# Patient Record
Sex: MALE | Race: WHITE | NOT HISPANIC OR LATINO | Employment: OTHER | URBAN - METROPOLITAN AREA
[De-identification: names, ages, dates, MRNs, and addresses within clinical notes are randomized per-mention and may not be internally consistent; named-entity substitution may affect disease eponyms.]

---

## 2021-12-09 ENCOUNTER — OFFICE VISIT (OUTPATIENT)
Dept: URGENT CARE | Facility: CLINIC | Age: 61
End: 2021-12-09
Payer: COMMERCIAL

## 2021-12-09 VITALS
HEART RATE: 62 BPM | RESPIRATION RATE: 19 BRPM | OXYGEN SATURATION: 97 % | DIASTOLIC BLOOD PRESSURE: 73 MMHG | SYSTOLIC BLOOD PRESSURE: 151 MMHG | TEMPERATURE: 99 F

## 2021-12-09 DIAGNOSIS — R94.31 ABNORMAL FINDING ON EKG: ICD-10-CM

## 2021-12-09 DIAGNOSIS — R07.9 CHEST PAIN, UNSPECIFIED TYPE: Primary | ICD-10-CM

## 2021-12-09 PROCEDURE — 99499 UNLISTED E&M SERVICE: CPT | Mod: S$GLB,,, | Performed by: INTERNAL MEDICINE

## 2021-12-09 PROCEDURE — 99499 NO LOS: ICD-10-PCS | Mod: S$GLB,,, | Performed by: INTERNAL MEDICINE

## 2021-12-09 RX ORDER — LISDEXAMFETAMINE DIMESYLATE 30 MG/1
30 CAPSULE ORAL
COMMUNITY
End: 2021-12-10

## 2021-12-10 ENCOUNTER — OFFICE VISIT (OUTPATIENT)
Dept: INTERNAL MEDICINE | Facility: CLINIC | Age: 61
End: 2021-12-10
Payer: COMMERCIAL

## 2021-12-10 DIAGNOSIS — R07.89 MUSCULAR CHEST PAIN: Primary | ICD-10-CM

## 2021-12-10 PROCEDURE — 99999 PR PBB SHADOW E&M-EST. PATIENT-LVL II: CPT | Mod: PBBFAC,,,

## 2021-12-10 PROCEDURE — 99213 PR OFFICE/OUTPT VISIT, EST, LEVL III, 20-29 MIN: ICD-10-PCS | Mod: GC,S$GLB,,

## 2021-12-10 PROCEDURE — 99999 PR PBB SHADOW E&M-EST. PATIENT-LVL II: ICD-10-PCS | Mod: PBBFAC,,,

## 2021-12-10 PROCEDURE — 99213 OFFICE O/P EST LOW 20 MIN: CPT | Mod: GC,S$GLB,,

## 2021-12-10 RX ORDER — MELOXICAM 15 MG/1
15 TABLET ORAL DAILY
Qty: 14 TABLET | Refills: 0 | Status: SHIPPED | OUTPATIENT
Start: 2021-12-10 | End: 2024-02-08

## 2021-12-10 RX ORDER — CYCLOBENZAPRINE HCL 5 MG
5 TABLET ORAL 3 TIMES DAILY PRN
Qty: 30 TABLET | Refills: 0 | Status: SHIPPED | OUTPATIENT
Start: 2021-12-10 | End: 2021-12-20

## 2021-12-13 ENCOUNTER — TELEPHONE (OUTPATIENT)
Dept: URGENT CARE | Facility: CLINIC | Age: 61
End: 2021-12-13
Payer: COMMERCIAL

## 2022-02-22 ENCOUNTER — TELEPHONE (OUTPATIENT)
Dept: INTERNAL MEDICINE | Facility: CLINIC | Age: 62
End: 2022-02-22
Payer: COMMERCIAL

## 2022-02-22 ENCOUNTER — OFFICE VISIT (OUTPATIENT)
Dept: URGENT CARE | Facility: CLINIC | Age: 62
End: 2022-02-22
Payer: COMMERCIAL

## 2022-02-22 VITALS
TEMPERATURE: 98 F | WEIGHT: 199 LBS | HEART RATE: 79 BPM | HEIGHT: 71 IN | DIASTOLIC BLOOD PRESSURE: 85 MMHG | SYSTOLIC BLOOD PRESSURE: 146 MMHG | OXYGEN SATURATION: 96 % | BODY MASS INDEX: 27.86 KG/M2 | RESPIRATION RATE: 17 BRPM

## 2022-02-22 DIAGNOSIS — R11.10 VOMITING, INTRACTABILITY OF VOMITING NOT SPECIFIED, PRESENCE OF NAUSEA NOT SPECIFIED, UNSPECIFIED VOMITING TYPE: Primary | ICD-10-CM

## 2022-02-22 DIAGNOSIS — R82.90 ABNORMAL URINALYSIS: ICD-10-CM

## 2022-02-22 DIAGNOSIS — R19.7 DIARRHEA, UNSPECIFIED TYPE: ICD-10-CM

## 2022-02-22 LAB
BILIRUB UR QL STRIP: POSITIVE
CTP QC/QA: YES
CTP QC/QA: YES
GLUCOSE UR QL STRIP: NEGATIVE
KETONES UR QL STRIP: POSITIVE
LEUKOCYTE ESTERASE UR QL STRIP: NEGATIVE
PH, POC UA: 6 (ref 5–8)
POC BLOOD, URINE: POSITIVE
POC MOLECULAR INFLUENZA A AGN: NEGATIVE
POC MOLECULAR INFLUENZA B AGN: NEGATIVE
POC NITRATES, URINE: NEGATIVE
PROT UR QL STRIP: POSITIVE
SARS-COV-2 RDRP RESP QL NAA+PROBE: NEGATIVE
SP GR UR STRIP: 1.02 (ref 1–1.03)
UROBILINOGEN UR STRIP-ACNC: 8 (ref 0.3–2.2)

## 2022-02-22 PROCEDURE — 81003 URINALYSIS AUTO W/O SCOPE: CPT | Mod: QW,S$GLB,, | Performed by: FAMILY MEDICINE

## 2022-02-22 PROCEDURE — 87502 POCT INFLUENZA A/B MOLECULAR: ICD-10-PCS | Mod: QW,S$GLB,, | Performed by: FAMILY MEDICINE

## 2022-02-22 PROCEDURE — 1159F MED LIST DOCD IN RCRD: CPT | Mod: CPTII,S$GLB,, | Performed by: FAMILY MEDICINE

## 2022-02-22 PROCEDURE — 3077F SYST BP >= 140 MM HG: CPT | Mod: CPTII,S$GLB,, | Performed by: FAMILY MEDICINE

## 2022-02-22 PROCEDURE — 3079F DIAST BP 80-89 MM HG: CPT | Mod: CPTII,S$GLB,, | Performed by: FAMILY MEDICINE

## 2022-02-22 PROCEDURE — U0002 COVID-19 LAB TEST NON-CDC: HCPCS | Mod: QW,S$GLB,, | Performed by: FAMILY MEDICINE

## 2022-02-22 PROCEDURE — 99214 PR OFFICE/OUTPT VISIT, EST, LEVL IV, 30-39 MIN: ICD-10-PCS | Mod: S$GLB,CS,, | Performed by: FAMILY MEDICINE

## 2022-02-22 PROCEDURE — 1160F PR REVIEW ALL MEDS BY PRESCRIBER/CLIN PHARMACIST DOCUMENTED: ICD-10-PCS | Mod: CPTII,S$GLB,, | Performed by: FAMILY MEDICINE

## 2022-02-22 PROCEDURE — 1160F RVW MEDS BY RX/DR IN RCRD: CPT | Mod: CPTII,S$GLB,, | Performed by: FAMILY MEDICINE

## 2022-02-22 PROCEDURE — 81003 POCT URINALYSIS, DIPSTICK, AUTOMATED, W/O SCOPE: ICD-10-PCS | Mod: QW,S$GLB,, | Performed by: FAMILY MEDICINE

## 2022-02-22 PROCEDURE — 87502 INFLUENZA DNA AMP PROBE: CPT | Mod: QW,S$GLB,, | Performed by: FAMILY MEDICINE

## 2022-02-22 PROCEDURE — 99214 OFFICE O/P EST MOD 30 MIN: CPT | Mod: S$GLB,CS,, | Performed by: FAMILY MEDICINE

## 2022-02-22 PROCEDURE — 3008F PR BODY MASS INDEX (BMI) DOCUMENTED: ICD-10-PCS | Mod: CPTII,S$GLB,, | Performed by: FAMILY MEDICINE

## 2022-02-22 PROCEDURE — 3008F BODY MASS INDEX DOCD: CPT | Mod: CPTII,S$GLB,, | Performed by: FAMILY MEDICINE

## 2022-02-22 PROCEDURE — 1159F PR MEDICATION LIST DOCUMENTED IN MEDICAL RECORD: ICD-10-PCS | Mod: CPTII,S$GLB,, | Performed by: FAMILY MEDICINE

## 2022-02-22 PROCEDURE — 3077F PR MOST RECENT SYSTOLIC BLOOD PRESSURE >= 140 MM HG: ICD-10-PCS | Mod: CPTII,S$GLB,, | Performed by: FAMILY MEDICINE

## 2022-02-22 PROCEDURE — 3079F PR MOST RECENT DIASTOLIC BLOOD PRESSURE 80-89 MM HG: ICD-10-PCS | Mod: CPTII,S$GLB,, | Performed by: FAMILY MEDICINE

## 2022-02-22 PROCEDURE — U0002: ICD-10-PCS | Mod: QW,S$GLB,, | Performed by: FAMILY MEDICINE

## 2022-02-22 NOTE — PROGRESS NOTES
"Subjective:       Patient ID: Santiago De Luna is a 61 y.o. male.    Vitals:  height is 5' 11" (1.803 m) and weight is 90.3 kg (199 lb). His temperature is 98.4 °F (36.9 °C). His blood pressure is 146/85 (abnormal) and his pulse is 79. His respiration is 17 and oxygen saturation is 96%.     Chief Complaint: Generalized Body Aches (Accompanied by fever and brain fog.)    Pt states yesterday started with 2 episodes of nonbloody or black vomiting and diarrhea. tmax 99.5, sore throat, congestion, body aches, back aches. No dysuria, hematuria. No cough, sob. Had some heartburn which is normal when he is going to vomit, no other cp. Sx reminded him of the flu which he gets often. Able to eat today. No covid exposure, vaccinated. Travel to [place] last 4 months ago no other recent travel, abx use, raw food consumption. No sick contacts. Pt states he meant feeling facial flushing when he described "brain fog"    URI   This is a new problem. The current episode started yesterday. There has been no fever. The fever has been present for less than 1 day. Associated symptoms include congestion, diarrhea, headaches, joint pain, nausea, a sore throat and vomiting. Pertinent negatives include no coughing. He has tried nothing for the symptoms. The treatment provided mild relief.       Constitution: Positive for fever.   HENT: Positive for congestion and sore throat.    Respiratory: Negative for cough and shortness of breath.    Gastrointestinal: Positive for nausea, vomiting and diarrhea.   Musculoskeletal: Positive for back pain and muscle ache.   Neurological: Positive for headaches.       Objective:      Physical Exam   Constitutional: He is oriented to person, place, and time. He appears well-developed. He is cooperative.  Non-toxic appearance. He does not appear ill. No distress.      Comments:Alert well appearing   HENT:   Head: Normocephalic and atraumatic.   Ears:   Right Ear: Hearing, tympanic membrane, external ear and ear " canal normal. Tympanic membrane is not erythematous. No middle ear effusion.   Left Ear: Hearing, tympanic membrane, external ear and ear canal normal. Tympanic membrane is not erythematous.  No middle ear effusion.   Nose: Nose normal. No mucosal edema, rhinorrhea or nasal deformity. No epistaxis. Right sinus exhibits no maxillary sinus tenderness and no frontal sinus tenderness. Left sinus exhibits no maxillary sinus tenderness and no frontal sinus tenderness.   Mouth/Throat: Uvula is midline, oropharynx is clear and moist and mucous membranes are normal. No trismus in the jaw. Normal dentition. No uvula swelling. No oropharyngeal exudate, posterior oropharyngeal edema, posterior oropharyngeal erythema, tonsillar abscesses or cobblestoning.   Eyes: Conjunctivae and lids are normal. No scleral icterus.   Neck: Trachea normal and phonation normal. Neck supple. No edema present. No erythema present. No neck rigidity present.   Cardiovascular: Normal rate, regular rhythm, normal heart sounds and normal pulses.   Pulmonary/Chest: Effort normal and breath sounds normal. No accessory muscle usage. No tachypnea. No respiratory distress. He has no decreased breath sounds. He has no wheezes. He has no rhonchi. He has no rales.   NAD able to speak in clear complete sentences without difficulty      Comments: NAD able to speak in clear complete sentences without difficulty      Abdominal: Normal appearance and bowel sounds are normal. Soft. There is abdominal tenderness (mild- no hx of diverticulitis) in the left upper quadrant and left lower quadrant. There is no rebound, no guarding, no tenderness at McBurney's point, negative Barfield's sign, no left CVA tenderness and no right CVA tenderness.   Musculoskeletal: Normal range of motion.         General: No deformity. Normal range of motion.   Neurological: He is alert and oriented to person, place, and time. He exhibits normal muscle tone. Coordination normal.   Skin: Skin is  warm, dry, intact, not diaphoretic and not pale.   Psychiatric: His speech is normal and behavior is normal. Judgment and thought content normal.   Nursing note and vitals reviewed.    Results for orders placed or performed in visit on 02/22/22   POCT COVID-19 Rapid Screening   Result Value Ref Range    POC Rapid COVID Negative Negative     Acceptable Yes    POCT Urinalysis, Dipstick, Automated, W/O Scope   Result Value Ref Range    POC Blood, Urine Positive (A) Negative    POC Bilirubin, Urine Positive (A) Negative    POC Urobilinogen, Urine 8.0 (A) 0.3 - 2.2    POC Ketones, Urine Positive (A) Negative    POC Protein, Urine Positive (A) Negative    POC Nitrates, Urine Negative Negative    POC Glucose, Urine Negative Negative    pH, UA 6.0 5 - 8    POC Specific Gravity, Urine 1.025 1.003 - 1.029    POC Leukocytes, Urine Negative Negative   POCT Influenza A/B Molecular   Result Value Ref Range    POC Molecular Influenza A Ag Negative Negative, Not Reported    POC Molecular Influenza B Ag Negative Negative, Not Reported     Acceptable Yes            Assessment:       1. Vomiting, intractability of vomiting not specified, presence of nausea not specified, unspecified vomiting type    2. Diarrhea, unspecified type    3. Abnormal urinalysis          Plan:         Vomiting, intractability of vomiting not specified, presence of nausea not specified, unspecified vomiting type  -     POCT COVID-19 Rapid Screening  -     POCT Urinalysis, Dipstick, Automated, W/O Scope  -     POCT Influenza A/B Molecular  -     Ambulatory referral/consult to Internal Medicine    Diarrhea, unspecified type    Abnormal urinalysis    flu and covid neg, declined zofran rx for home  Pt overall feeling better today, emesis and diarrhea has improved over course of the day. Able to eat today (smoothie, hummus and vegetables)  Discussed UA results with him, urine darker than normal (gus) but pt states hasn't drank much  "today. Has not done any very strenuous exercise or physical activity, considered rhabdo.   He denies any hx of liver/kidney dz - reviewed past labs from last few years always normal. He does drink 1-2 "pours" of whiskey nightly for past 10 years and takes mobic every few days for past 1-2 years. No excessive tylenol. No jaundice or lightening of the stool  Discussed needs labs/possible imaging to evaluate his sx further and check liver function - called [facility] service for appt tomorrow however nothing available they could see, would reach out to clinics and see if they have a walk in appt  Pt declined doing stat labs and imaging tonight and was agreeable to following up with primary care. Discussed ER precautions for the meantime   Will get avs from portal  Total time on encounter 35 min    Patient Instructions   PLEASE READ YOUR DISCHARGE INSTRUCTIONS ENTIRELY AS IT CONTAINS IMPORTANT INFORMATION.      Use gatorade/pedialyte or rehydration packets to help stay hydrated. Vitamin water and plain water do not contain rehydrating electrolytes.  Increase clear liquids (water, gatorade, pedialyte, broths, jello, etc) Hold off on solids for 12-18 hours. Then advance to BRAT diet (banana, rice, applesauce, tea, toast/crackers), then advance further as tolerated. Avoid spicy or fatty foods.   Use Peptobismol to help alleviate your diarrhea symptoms.     Avoid imodium unless you have more than 6 loose stools in 24 hours.       Wash hands frequently while sick. Avoid ibuprofen, mobic tylenol for now. Avoid alcohol      Please go to the ER if you experience worsening abdominal pain, blood in your vomit or stool, high fever, dizziness, fainting, swelling of your abdomen, inability to pass gas or stool, vomiting despite taking nausea medication, darker urine, lighter stools.     Primary care will reach out to you about an appt in the next few days      Please arrange follow up with your primary medical clinic as soon as " possible. You must understand that you've received an Urgent Care treatment only and that you may be released before all of your medical problems are known or treated. You, the patient, will arrange for follow up as instructed. If your symptoms worsen or fail to improve you should go to the Emergency Room.  WE CANNOT RULE OUT ALL POSSIBLE CAUSES OF YOUR SYMPTOMS IN THE URGENT CARE SETTING PLEASE GO TO THE ER IF YOU FEELS YOUR CONDITION IS WORSENING OR YOU WOULD LIKE EMERGENT EVALUATION.

## 2022-02-22 NOTE — PATIENT INSTRUCTIONS
PLEASE READ YOUR DISCHARGE INSTRUCTIONS ENTIRELY AS IT CONTAINS IMPORTANT INFORMATION.      Use gatorade/pedialyte or rehydration packets to help stay hydrated. Vitamin water and plain water do not contain rehydrating electrolytes.  Increase clear liquids (water, gatorade, pedialyte, broths, jello, etc) Hold off on solids for 12-18 hours. Then advance to BRAT diet (banana, rice, applesauce, tea, toast/crackers), then advance further as tolerated. Avoid spicy or fatty foods.   Use Peptobismol to help alleviate your diarrhea symptoms.     Avoid imodium unless you have more than 6 loose stools in 24 hours.       Wash hands frequently while sick. Avoid ibuprofen, mobic tylenol for now. Avoid alcohol      Please go to the ER if you experience worsening abdominal pain, blood in your vomit or stool, high fever, dizziness, fainting, swelling of your abdomen, inability to pass gas or stool, vomiting despite taking nausea medication, darker urine, lighter stools.     Primary care will reach out to you about an appt in the next few days      Please arrange follow up with your primary medical clinic as soon as possible. You must understand that you've received an Urgent Care treatment only and that you may be released before all of your medical problems are known or treated. You, the patient, will arrange for follow up as instructed. If your symptoms worsen or fail to improve you should go to the Emergency Room.  WE CANNOT RULE OUT ALL POSSIBLE CAUSES OF YOUR SYMPTOMS IN THE URGENT CARE SETTING PLEASE GO TO THE ER IF YOU FEELS YOUR CONDITION IS WORSENING OR YOU WOULD LIKE EMERGENT EVALUATION.

## 2022-02-23 NOTE — TELEPHONE ENCOUNTER
----- Message from Mary Flores sent at 2/22/2022  3:51 PM CST -----  Regarding: appt  Hi,    Received a call from [facility] requesting a Same-Day, Next Day appt for the following patient. Patient dx is Vomiting.  EP to Dr. Crawford but willing to see any available provider.    I'm unable to ophelia due to EP to Dr. Crawford, no appts populating. Can you please review and contact patient for scheduling?    Thank you,  [facility]  Taty Oneal

## 2022-02-24 ENCOUNTER — PATIENT MESSAGE (OUTPATIENT)
Dept: INTERNAL MEDICINE | Facility: CLINIC | Age: 62
End: 2022-02-24
Payer: COMMERCIAL

## 2022-03-23 ENCOUNTER — OFFICE VISIT (OUTPATIENT)
Dept: INTERNAL MEDICINE | Facility: CLINIC | Age: 62
End: 2022-03-23
Payer: COMMERCIAL

## 2022-03-23 VITALS
BODY MASS INDEX: 27.41 KG/M2 | DIASTOLIC BLOOD PRESSURE: 80 MMHG | HEIGHT: 72 IN | WEIGHT: 202.38 LBS | HEART RATE: 78 BPM | SYSTOLIC BLOOD PRESSURE: 130 MMHG | OXYGEN SATURATION: 95 % | TEMPERATURE: 98 F

## 2022-03-23 DIAGNOSIS — F41.1 GAD (GENERALIZED ANXIETY DISORDER): ICD-10-CM

## 2022-03-23 DIAGNOSIS — Z01.89 ENCOUNTER FOR ROUTINE LABORATORY TESTING: ICD-10-CM

## 2022-03-23 DIAGNOSIS — R42 VERTIGO: Primary | ICD-10-CM

## 2022-03-23 PROCEDURE — 3079F PR MOST RECENT DIASTOLIC BLOOD PRESSURE 80-89 MM HG: ICD-10-PCS | Mod: CPTII,S$GLB,, | Performed by: NURSE PRACTITIONER

## 2022-03-23 PROCEDURE — 3008F PR BODY MASS INDEX (BMI) DOCUMENTED: ICD-10-PCS | Mod: CPTII,S$GLB,, | Performed by: NURSE PRACTITIONER

## 2022-03-23 PROCEDURE — 99213 OFFICE O/P EST LOW 20 MIN: CPT | Mod: S$GLB,,, | Performed by: NURSE PRACTITIONER

## 2022-03-23 PROCEDURE — 99999 PR PBB SHADOW E&M-EST. PATIENT-LVL III: ICD-10-PCS | Mod: PBBFAC,,, | Performed by: NURSE PRACTITIONER

## 2022-03-23 PROCEDURE — 1159F PR MEDICATION LIST DOCUMENTED IN MEDICAL RECORD: ICD-10-PCS | Mod: CPTII,S$GLB,, | Performed by: NURSE PRACTITIONER

## 2022-03-23 PROCEDURE — 1159F MED LIST DOCD IN RCRD: CPT | Mod: CPTII,S$GLB,, | Performed by: NURSE PRACTITIONER

## 2022-03-23 PROCEDURE — 3075F SYST BP GE 130 - 139MM HG: CPT | Mod: CPTII,S$GLB,, | Performed by: NURSE PRACTITIONER

## 2022-03-23 PROCEDURE — 3079F DIAST BP 80-89 MM HG: CPT | Mod: CPTII,S$GLB,, | Performed by: NURSE PRACTITIONER

## 2022-03-23 PROCEDURE — 99213 PR OFFICE/OUTPT VISIT, EST, LEVL III, 20-29 MIN: ICD-10-PCS | Mod: S$GLB,,, | Performed by: NURSE PRACTITIONER

## 2022-03-23 PROCEDURE — 3008F BODY MASS INDEX DOCD: CPT | Mod: CPTII,S$GLB,, | Performed by: NURSE PRACTITIONER

## 2022-03-23 PROCEDURE — 99999 PR PBB SHADOW E&M-EST. PATIENT-LVL III: CPT | Mod: PBBFAC,,, | Performed by: NURSE PRACTITIONER

## 2022-03-23 PROCEDURE — 3075F PR MOST RECENT SYSTOLIC BLOOD PRESS GE 130-139MM HG: ICD-10-PCS | Mod: CPTII,S$GLB,, | Performed by: NURSE PRACTITIONER

## 2022-03-23 RX ORDER — TADALAFIL 5 MG/1
TABLET ORAL
COMMUNITY
Start: 2020-01-01 | End: 2023-04-11 | Stop reason: SDUPTHER

## 2022-03-23 RX ORDER — MECLIZINE HYDROCHLORIDE 25 MG/1
25 TABLET ORAL 3 TIMES DAILY PRN
Qty: 30 TABLET | Refills: 0 | Status: SHIPPED | OUTPATIENT
Start: 2022-03-23 | End: 2022-04-06 | Stop reason: SDUPTHER

## 2022-03-23 RX ORDER — BUSPIRONE HYDROCHLORIDE 5 MG/1
5 TABLET ORAL 3 TIMES DAILY PRN
Qty: 30 TABLET | Refills: 0 | Status: SHIPPED | OUTPATIENT
Start: 2022-03-23 | End: 2022-07-21

## 2022-03-23 NOTE — PROGRESS NOTES
"Subjective:       Patient ID: Santiago De Luna is a 61 y.o. male.    Chief Complaint: Anxiety      Patient is a 61 y.o. male who traditionally follows with Francine Crawford MD presenting today for:    Anxiety and Visual Vertigo  States for his whole life he has experienced visual "defects". States he could make lights move in a 3D fashion if he stared at them as a kid.  Finds now that he cannot look through fences/mesh ect cause the inability to focus either far nor close makes him dizzy.   Additionally he cannot drive over bridges or drive fast such as [place] the [place] because it causes this vertigo. States driving over bridges (even just talking about it) makes his palms sweaty and incites panic. The dizziness only lasts as long as the exposure.      Review of patient's allergies indicates:   Allergen Reactions    Indomethacin      Diarrhea and gas    Nsaids (non-steroidal anti-inflammatory drug) Itching, Rash and Swelling     Swelling of face    Shellfish derived Itching, Rash and Swelling     Mussels,clams,shrimps- rash  Swelling of face      Hydrocodone Hives, Itching and Rash    Hydrocodone-acetaminophen Itching and Rash       Medication List with Changes/Refills   New Medications    BUSPIRONE (BUSPAR) 5 MG TAB    Take 1 tablet (5 mg total) by mouth 3 (three) times daily as needed (anxiety).    MECLIZINE (ANTIVERT) 25 MG TABLET    Take 1 tablet (25 mg total) by mouth 3 (three) times daily as needed for Dizziness.   Current Medications    MELOXICAM (MOBIC) 15 MG TABLET    Take 1 tablet (15 mg total) by mouth once daily.    TADALAFIL (CIALIS) 5 MG TABLET           Health Maintenance    Flu Vaccine: 09/24/2021  Tetanus/Tdap: 12/29/2010  Zoster Vaccine: 11/30/2021, 09/28/2021    He has never smoked.    Medical, social and surgical history has been reviewed with the patient.      Review of Systems   HENT: Negative for tinnitus.    Eyes: Negative for blurred vision, double vision and photophobia.   Neurological: " "Positive for dizziness. Negative for tremors, loss of consciousness, weakness and headaches.   Psychiatric/Behavioral: The patient is nervous/anxious.        Objective:   /80 (BP Location: Right arm, Patient Position: Sitting, BP Method: Medium (Manual))   Pulse 78   Temp 97.9 °F (36.6 °C) (Temporal)   Ht 5' 11.5" (1.816 m)   Wt 91.8 kg (202 lb 6.1 oz)   SpO2 95%   BMI 27.83 kg/m²       Physical Exam  Vitals reviewed.   Constitutional:       Appearance: Normal appearance.   HENT:      Head: Normocephalic and atraumatic.      Right Ear: A middle ear effusion is present. Tympanic membrane is bulging.      Left Ear: A middle ear effusion is present. Tympanic membrane is bulging.   Eyes:      Extraocular Movements: Extraocular movements intact.      Conjunctiva/sclera: Conjunctivae normal.      Pupils: Pupils are equal, round, and reactive to light.   Cardiovascular:      Rate and Rhythm: Normal rate and regular rhythm.      Heart sounds: Normal heart sounds. No murmur heard.  Pulmonary:      Effort: Pulmonary effort is normal.      Breath sounds: Normal breath sounds. No wheezing.   Skin:     General: Skin is warm and dry.   Neurological:      Mental Status: He is alert and oriented to person, place, and time.      Cranial Nerves: No cranial nerve deficit (CN III - XII grossly intact).      Motor: No weakness.       Assessment and Plan:     1. Vertigo  2. JOSE GUADALUPE (generalized anxiety disorder)    Chronic, recurrent, medication trial. Referral if no s/s improvement.     - meclizine (ANTIVERT) 25 mg tablet; Take 1 tablet (25 mg total) by mouth 3 (three) times daily as needed for Dizziness.  Dispense: 30 tablet; Refill: 0  - busPIRone (BUSPAR) 5 MG Tab; Take 1 tablet (5 mg total) by mouth 3 (three) times daily as needed (anxiety).  Dispense: 30 tablet; Refill: 0    3. Encounter for routine laboratory testing    - CBC Auto Differential; Future  - Comprehensive Metabolic Panel; Future  - Lipid Panel; Future  - TSH; " Future  - PSA, Screening; Future        Non-smoker

## 2022-03-31 ENCOUNTER — LAB VISIT (OUTPATIENT)
Dept: LAB | Facility: HOSPITAL | Age: 62
End: 2022-03-31
Attending: NURSE PRACTITIONER
Payer: COMMERCIAL

## 2022-03-31 DIAGNOSIS — Z01.89 ENCOUNTER FOR ROUTINE LABORATORY TESTING: ICD-10-CM

## 2022-03-31 LAB
ALBUMIN SERPL BCP-MCNC: 4.3 G/DL (ref 3.5–5.2)
ALP SERPL-CCNC: 45 U/L (ref 55–135)
ALT SERPL W/O P-5'-P-CCNC: 40 U/L (ref 10–44)
ANION GAP SERPL CALC-SCNC: 8 MMOL/L (ref 8–16)
AST SERPL-CCNC: 32 U/L (ref 10–40)
BASOPHILS # BLD AUTO: 0.05 K/UL (ref 0–0.2)
BASOPHILS NFR BLD: 0.9 % (ref 0–1.9)
BILIRUB SERPL-MCNC: 1.3 MG/DL (ref 0.1–1)
BUN SERPL-MCNC: 8 MG/DL (ref 8–23)
CALCIUM SERPL-MCNC: 10.5 MG/DL (ref 8.7–10.5)
CHLORIDE SERPL-SCNC: 104 MMOL/L (ref 95–110)
CHOLEST SERPL-MCNC: 209 MG/DL (ref 120–199)
CHOLEST/HDLC SERPL: 3.4 {RATIO} (ref 2–5)
CO2 SERPL-SCNC: 28 MMOL/L (ref 23–29)
COMPLEXED PSA SERPL-MCNC: 1.9 NG/ML (ref 0–4)
CREAT SERPL-MCNC: 0.7 MG/DL (ref 0.5–1.4)
DIFFERENTIAL METHOD: ABNORMAL
EOSINOPHIL # BLD AUTO: 0.2 K/UL (ref 0–0.5)
EOSINOPHIL NFR BLD: 2.6 % (ref 0–8)
ERYTHROCYTE [DISTWIDTH] IN BLOOD BY AUTOMATED COUNT: 11.8 % (ref 11.5–14.5)
EST. GFR  (AFRICAN AMERICAN): >60 ML/MIN/1.73 M^2
EST. GFR  (NON AFRICAN AMERICAN): >60 ML/MIN/1.73 M^2
GLUCOSE SERPL-MCNC: 89 MG/DL (ref 70–110)
HCT VFR BLD AUTO: 44.7 % (ref 40–54)
HDLC SERPL-MCNC: 62 MG/DL (ref 40–75)
HDLC SERPL: 29.7 % (ref 20–50)
HGB BLD-MCNC: 14.9 G/DL (ref 14–18)
IMM GRANULOCYTES # BLD AUTO: 0.01 K/UL (ref 0–0.04)
IMM GRANULOCYTES NFR BLD AUTO: 0.2 % (ref 0–0.5)
LDLC SERPL CALC-MCNC: 124.4 MG/DL (ref 63–159)
LYMPHOCYTES # BLD AUTO: 2.1 K/UL (ref 1–4.8)
LYMPHOCYTES NFR BLD: 35.7 % (ref 18–48)
MCH RBC QN AUTO: 33.9 PG (ref 27–31)
MCHC RBC AUTO-ENTMCNC: 33.3 G/DL (ref 32–36)
MCV RBC AUTO: 102 FL (ref 82–98)
MONOCYTES # BLD AUTO: 0.7 K/UL (ref 0.3–1)
MONOCYTES NFR BLD: 11.2 % (ref 4–15)
NEUTROPHILS # BLD AUTO: 2.9 K/UL (ref 1.8–7.7)
NEUTROPHILS NFR BLD: 49.4 % (ref 38–73)
NONHDLC SERPL-MCNC: 147 MG/DL
NRBC BLD-RTO: 0 /100 WBC
PLATELET # BLD AUTO: 254 K/UL (ref 150–450)
PMV BLD AUTO: 9.9 FL (ref 9.2–12.9)
POTASSIUM SERPL-SCNC: 4.8 MMOL/L (ref 3.5–5.1)
PROT SERPL-MCNC: 6.4 G/DL (ref 6–8.4)
RBC # BLD AUTO: 4.39 M/UL (ref 4.6–6.2)
SODIUM SERPL-SCNC: 140 MMOL/L (ref 136–145)
TRIGL SERPL-MCNC: 113 MG/DL (ref 30–150)
TSH SERPL DL<=0.005 MIU/L-ACNC: 1.58 UIU/ML (ref 0.4–4)
WBC # BLD AUTO: 5.82 K/UL (ref 3.9–12.7)

## 2022-03-31 PROCEDURE — 85025 COMPLETE CBC W/AUTO DIFF WBC: CPT | Performed by: NURSE PRACTITIONER

## 2022-03-31 PROCEDURE — 36415 COLL VENOUS BLD VENIPUNCTURE: CPT | Mod: PO | Performed by: NURSE PRACTITIONER

## 2022-03-31 PROCEDURE — 84443 ASSAY THYROID STIM HORMONE: CPT | Performed by: NURSE PRACTITIONER

## 2022-03-31 PROCEDURE — 80053 COMPREHEN METABOLIC PANEL: CPT | Performed by: NURSE PRACTITIONER

## 2022-03-31 PROCEDURE — 80061 LIPID PANEL: CPT | Performed by: NURSE PRACTITIONER

## 2022-03-31 PROCEDURE — 84153 ASSAY OF PSA TOTAL: CPT | Performed by: NURSE PRACTITIONER

## 2022-04-05 NOTE — PROGRESS NOTES
Subjective:       Patient ID: Santiago De Luna is a 61 y.o. male.    Chief Complaint: Annual Wellness Visit      Santiago De Luna is a 61 y.o. male who presents today for an annual wellness visit.     Since our last visit patient has been taking Antivert nightly and notes that he has some relief in his vertigo symptoms.     Review of patient's allergies indicates:   Allergen Reactions    Indomethacin      Diarrhea and gas    Shellfish derived Itching, Rash and Swelling     Mussels,clams,shrimps- rash  Swelling of face      Hydrocodone Hives, Itching and Rash    Hydrocodone-acetaminophen Itching and Rash      Medication List with Changes/Refills   Current Medications    BUSPIRONE (BUSPAR) 5 MG TAB    Take 1 tablet (5 mg total) by mouth 3 (three) times daily as needed (anxiety).    MELOXICAM (MOBIC) 15 MG TABLET    Take 1 tablet (15 mg total) by mouth once daily.    TADALAFIL (CIALIS) 5 MG TABLET       Changed and/or Refilled Medications    Modified Medication Previous Medication    MECLIZINE (ANTIVERT) 25 MG TABLET meclizine (ANTIVERT) 25 mg tablet       Take 1 tablet (25 mg total) by mouth 3 (three) times daily as needed for Dizziness.    Take 1 tablet (25 mg total) by mouth 3 (three) times daily as needed for Dizziness.       Health Maintenance     Flu Vaccine: 09/24/2021  Tetanus/Tdap: 04/06/2022   Zoster Vaccine: 11/30/2021, 09/28/2021  HIV/Hepatitis C Screen: Declined    PSA: 3/31/2022    How would you described your general health: Good    Patient ambulates on his own without an assistive device.     On average, how many days per week do you do moderate to strenuous exercise:  (like a brisk walk or jog; this does not include your job/work)  3-5     On average, how many minutes do you exercise at this level each day? 45    Do you eat fruits and vegetable every day?  Yes    Do you have any questions or concerns about your eating habits?  No    Do you have any concerns in regards to access to food? No    Do you always  "wear your seat belt in the car?  Yes    Do you Text while driving? No    Have you ever used tobacco products? No    Have you ever been screened for HIV? Yes  Would you like to be screened for HIV? No    Do you go to the dentist regularly? Yes  When was you last exam:     Do you go to the eye doctor regularly? Yes  When was your last exam:    Do you wear contacts, glasses, reading glasses? Yes    Do you have any personal or family history of breast/ovarian/colon cancer?  No       If yes, who?    Do you have any personal or family history of heart disease? Yes       If yes, who?    Have you often been bothered by feeling down, depressed, or hopeless?  Not at all    Have you often been bothered by having little interest or pleasure in doing things?  Not at all    Review of Systems   Constitutional: Negative for fever.   HENT: Negative for congestion, sore throat and tinnitus.    Eyes: Negative for blurred vision, double vision and photophobia.   Respiratory: Negative for cough and shortness of breath.    Cardiovascular: Negative for chest pain.   Gastrointestinal: Negative for abdominal pain, nausea and vomiting.   Musculoskeletal: Positive for joint pain. Negative for back pain and falls.   Neurological: Positive for dizziness (improving). Negative for tremors, loss of consciousness, weakness and headaches.   Psychiatric/Behavioral: Negative for memory loss.       Objective:     /80 (BP Location: Right arm, Patient Position: Sitting, BP Method: Medium (Manual))   Pulse 64   Temp 97.3 °F (36.3 °C) (Temporal)   Ht 5' 11.5" (1.816 m)   Wt 90.8 kg (200 lb 2.8 oz)   SpO2 97%   BMI 27.53 kg/m²     Physical Exam  Vitals reviewed.   Constitutional:       Appearance: Normal appearance.   HENT:      Head: Normocephalic and atraumatic.   Cardiovascular:      Rate and Rhythm: Normal rate and regular rhythm.      Heart sounds: Normal heart sounds.   Pulmonary:      Effort: Pulmonary effort is normal.      Breath " sounds: Normal breath sounds. No wheezing.   Musculoskeletal:      Right shoulder: Normal range of motion (pain with flexion against resistance). Decreased strength (very minimally noted).   Neurological:      Mental Status: He is alert and oriented to person, place, and time.       BP Readings from Last 3 Encounters:   04/06/22 120/80   03/23/22 130/80   02/22/22 (!) 146/85     Wt Readings from Last 3 Encounters:   04/06/22 90.8 kg (200 lb 2.8 oz)   03/23/22 91.8 kg (202 lb 6.1 oz)   02/22/22 90.3 kg (199 lb)       Last Labs:  Glucose   Date Value Ref Range Status   03/31/2022 89 70 - 110 mg/dL Final     BUN   Date Value Ref Range Status   03/31/2022 8 8 - 23 mg/dL Final     Creatinine   Date Value Ref Range Status   03/31/2022 0.7 0.5 - 1.4 mg/dL Final     Cholesterol   Date Value Ref Range Status   03/31/2022 209 (H) 120 - 199 mg/dL Final     Comment:     The National Cholesterol Education Program (NCEP) has set the  following guidelines (reference ranges) for Cholesterol:  Optimal.....................<200 mg/dL  Borderline High.............200-239 mg/dL  High........................> or = 240 mg/dL       No results found for: HGBA1C  Hemoglobin   Date Value Ref Range Status   03/31/2022 14.9 14.0 - 18.0 g/dL Final     Hematocrit   Date Value Ref Range Status   03/31/2022 44.7 40.0 - 54.0 % Final     I have reviewed the following:     Details / Date    [x]   Labs     []   Micro     []   Pathology     []   Imaging     []   Cardiology Procedures     [x]   Other  Patient's Medical and Surgical History        Assessment and Plan:     1. Encounter for annual health examination    --Immunizations reviewed.  --Discussed benefits of maintaining up to date health    Maintenance.    2. Vertigo    - meclizine (ANTIVERT) 25 mg tablet; Take 1 tablet (25 mg total) by mouth 3 (three) times daily as needed for Dizziness.  Dispense: 90 tablet; Refill: 1    3. Acute pain of right shoulder  4. History of rotator cuff surgery    -  Ambulatory referral/consult to Orthopedics; Future    5. Need for vaccination    - Td Vaccine (Adult) - Preservative Free

## 2022-04-06 ENCOUNTER — OFFICE VISIT (OUTPATIENT)
Dept: INTERNAL MEDICINE | Facility: CLINIC | Age: 62
End: 2022-04-06
Payer: COMMERCIAL

## 2022-04-06 VITALS
HEIGHT: 72 IN | OXYGEN SATURATION: 97 % | SYSTOLIC BLOOD PRESSURE: 120 MMHG | DIASTOLIC BLOOD PRESSURE: 80 MMHG | TEMPERATURE: 97 F | BODY MASS INDEX: 27.11 KG/M2 | HEART RATE: 64 BPM | WEIGHT: 200.19 LBS

## 2022-04-06 DIAGNOSIS — Z00.00 ENCOUNTER FOR ANNUAL HEALTH EXAMINATION: Primary | ICD-10-CM

## 2022-04-06 DIAGNOSIS — R42 VERTIGO: ICD-10-CM

## 2022-04-06 DIAGNOSIS — Z98.890 HISTORY OF ROTATOR CUFF SURGERY: ICD-10-CM

## 2022-04-06 DIAGNOSIS — Z23 NEED FOR VACCINATION: ICD-10-CM

## 2022-04-06 DIAGNOSIS — M25.511 ACUTE PAIN OF RIGHT SHOULDER: ICD-10-CM

## 2022-04-06 PROCEDURE — 90714 TD VACC NO PRESV 7 YRS+ IM: CPT | Mod: S$GLB,,, | Performed by: NURSE PRACTITIONER

## 2022-04-06 PROCEDURE — 3008F PR BODY MASS INDEX (BMI) DOCUMENTED: ICD-10-PCS | Mod: CPTII,S$GLB,, | Performed by: NURSE PRACTITIONER

## 2022-04-06 PROCEDURE — 1160F RVW MEDS BY RX/DR IN RCRD: CPT | Mod: CPTII,S$GLB,, | Performed by: NURSE PRACTITIONER

## 2022-04-06 PROCEDURE — 3079F PR MOST RECENT DIASTOLIC BLOOD PRESSURE 80-89 MM HG: ICD-10-PCS | Mod: CPTII,S$GLB,, | Performed by: NURSE PRACTITIONER

## 2022-04-06 PROCEDURE — 90471 TD VACCINE GREATER THAN OR EQUAL TO 7YO PRESERVATIVE FREE IM: ICD-10-PCS | Mod: S$GLB,,, | Performed by: NURSE PRACTITIONER

## 2022-04-06 PROCEDURE — 1159F MED LIST DOCD IN RCRD: CPT | Mod: CPTII,S$GLB,, | Performed by: NURSE PRACTITIONER

## 2022-04-06 PROCEDURE — 90471 IMMUNIZATION ADMIN: CPT | Mod: S$GLB,,, | Performed by: NURSE PRACTITIONER

## 2022-04-06 PROCEDURE — 99396 PREV VISIT EST AGE 40-64: CPT | Mod: 25,S$GLB,, | Performed by: NURSE PRACTITIONER

## 2022-04-06 PROCEDURE — 1159F PR MEDICATION LIST DOCUMENTED IN MEDICAL RECORD: ICD-10-PCS | Mod: CPTII,S$GLB,, | Performed by: NURSE PRACTITIONER

## 2022-04-06 PROCEDURE — 90714 TD VACCINE GREATER THAN OR EQUAL TO 7YO PRESERVATIVE FREE IM: ICD-10-PCS | Mod: S$GLB,,, | Performed by: NURSE PRACTITIONER

## 2022-04-06 PROCEDURE — 3079F DIAST BP 80-89 MM HG: CPT | Mod: CPTII,S$GLB,, | Performed by: NURSE PRACTITIONER

## 2022-04-06 PROCEDURE — 99396 PR PREVENTIVE VISIT,EST,40-64: ICD-10-PCS | Mod: 25,S$GLB,, | Performed by: NURSE PRACTITIONER

## 2022-04-06 PROCEDURE — 3074F SYST BP LT 130 MM HG: CPT | Mod: CPTII,S$GLB,, | Performed by: NURSE PRACTITIONER

## 2022-04-06 PROCEDURE — 1160F PR REVIEW ALL MEDS BY PRESCRIBER/CLIN PHARMACIST DOCUMENTED: ICD-10-PCS | Mod: CPTII,S$GLB,, | Performed by: NURSE PRACTITIONER

## 2022-04-06 PROCEDURE — 3008F BODY MASS INDEX DOCD: CPT | Mod: CPTII,S$GLB,, | Performed by: NURSE PRACTITIONER

## 2022-04-06 PROCEDURE — 99999 PR PBB SHADOW E&M-EST. PATIENT-LVL IV: ICD-10-PCS | Mod: PBBFAC,,, | Performed by: NURSE PRACTITIONER

## 2022-04-06 PROCEDURE — 3074F PR MOST RECENT SYSTOLIC BLOOD PRESSURE < 130 MM HG: ICD-10-PCS | Mod: CPTII,S$GLB,, | Performed by: NURSE PRACTITIONER

## 2022-04-06 PROCEDURE — 99999 PR PBB SHADOW E&M-EST. PATIENT-LVL IV: CPT | Mod: PBBFAC,,, | Performed by: NURSE PRACTITIONER

## 2022-04-06 RX ORDER — MECLIZINE HYDROCHLORIDE 25 MG/1
25 TABLET ORAL 3 TIMES DAILY PRN
Qty: 90 TABLET | Refills: 1 | Status: SHIPPED | OUTPATIENT
Start: 2022-04-06

## 2022-04-20 DIAGNOSIS — M25.511 RIGHT SHOULDER PAIN, UNSPECIFIED CHRONICITY: Primary | ICD-10-CM

## 2022-04-21 ENCOUNTER — HOSPITAL ENCOUNTER (OUTPATIENT)
Dept: RADIOLOGY | Facility: HOSPITAL | Age: 62
Discharge: HOME | End: 2022-04-21
Attending: PHYSICIAN ASSISTANT
Payer: COMMERCIAL

## 2022-04-21 ENCOUNTER — OFFICE VISIT (OUTPATIENT)
Dept: SPORTS MEDICINE | Facility: CLINIC | Age: 62
End: 2022-04-21
Payer: COMMERCIAL

## 2022-04-21 VITALS
BODY MASS INDEX: 28.09 KG/M2 | SYSTOLIC BLOOD PRESSURE: 138 MMHG | HEART RATE: 64 BPM | WEIGHT: 200.63 LBS | DIASTOLIC BLOOD PRESSURE: 83 MMHG | HEIGHT: 71 IN

## 2022-04-21 DIAGNOSIS — M25.511 RIGHT SHOULDER PAIN, UNSPECIFIED CHRONICITY: ICD-10-CM

## 2022-04-21 DIAGNOSIS — M25.511 ACUTE PAIN OF RIGHT SHOULDER: ICD-10-CM

## 2022-04-21 DIAGNOSIS — M75.101 ROTATOR CUFF SYNDROME, RIGHT: Primary | ICD-10-CM

## 2022-04-21 DIAGNOSIS — Z98.890 HISTORY OF ROTATOR CUFF SURGERY: ICD-10-CM

## 2022-04-21 PROCEDURE — 99204 OFFICE O/P NEW MOD 45 MIN: CPT | Mod: 25,S$GLB,, | Performed by: PHYSICIAN ASSISTANT

## 2022-04-21 PROCEDURE — 3008F PR BODY MASS INDEX (BMI) DOCUMENTED: ICD-10-PCS | Mod: CPTII,S$GLB,, | Performed by: PHYSICIAN ASSISTANT

## 2022-04-21 PROCEDURE — 73030 XR SHOULDER COMPLETE 2 OR MORE VIEWS RIGHT: ICD-10-PCS | Mod: 26,RT,, | Performed by: RADIOLOGY

## 2022-04-21 PROCEDURE — 1159F PR MEDICATION LIST DOCUMENTED IN MEDICAL RECORD: ICD-10-PCS | Mod: CPTII,S$GLB,, | Performed by: PHYSICIAN ASSISTANT

## 2022-04-21 PROCEDURE — 99999 PR PBB SHADOW E&M-EST. PATIENT-LVL IV: CPT | Mod: PBBFAC,,, | Performed by: PHYSICIAN ASSISTANT

## 2022-04-21 PROCEDURE — 20610 PR DRAIN/INJECT LARGE JOINT/BURSA: ICD-10-PCS | Mod: RT,S$GLB,, | Performed by: PHYSICIAN ASSISTANT

## 2022-04-21 PROCEDURE — 20610 DRAIN/INJ JOINT/BURSA W/O US: CPT | Mod: RT,S$GLB,, | Performed by: PHYSICIAN ASSISTANT

## 2022-04-21 PROCEDURE — 3079F DIAST BP 80-89 MM HG: CPT | Mod: CPTII,S$GLB,, | Performed by: PHYSICIAN ASSISTANT

## 2022-04-21 PROCEDURE — 1160F PR REVIEW ALL MEDS BY PRESCRIBER/CLIN PHARMACIST DOCUMENTED: ICD-10-PCS | Mod: CPTII,S$GLB,, | Performed by: PHYSICIAN ASSISTANT

## 2022-04-21 PROCEDURE — 1160F RVW MEDS BY RX/DR IN RCRD: CPT | Mod: CPTII,S$GLB,, | Performed by: PHYSICIAN ASSISTANT

## 2022-04-21 PROCEDURE — 73030 X-RAY EXAM OF SHOULDER: CPT | Mod: 26,RT,, | Performed by: RADIOLOGY

## 2022-04-21 PROCEDURE — 3075F SYST BP GE 130 - 139MM HG: CPT | Mod: CPTII,S$GLB,, | Performed by: PHYSICIAN ASSISTANT

## 2022-04-21 PROCEDURE — 1159F MED LIST DOCD IN RCRD: CPT | Mod: CPTII,S$GLB,, | Performed by: PHYSICIAN ASSISTANT

## 2022-04-21 PROCEDURE — 73030 X-RAY EXAM OF SHOULDER: CPT | Mod: TC,PN,RT

## 2022-04-21 PROCEDURE — 3075F PR MOST RECENT SYSTOLIC BLOOD PRESS GE 130-139MM HG: ICD-10-PCS | Mod: CPTII,S$GLB,, | Performed by: PHYSICIAN ASSISTANT

## 2022-04-21 PROCEDURE — 3008F BODY MASS INDEX DOCD: CPT | Mod: CPTII,S$GLB,, | Performed by: PHYSICIAN ASSISTANT

## 2022-04-21 PROCEDURE — 99204 PR OFFICE/OUTPT VISIT, NEW, LEVL IV, 45-59 MIN: ICD-10-PCS | Mod: 25,S$GLB,, | Performed by: PHYSICIAN ASSISTANT

## 2022-04-21 PROCEDURE — 3079F PR MOST RECENT DIASTOLIC BLOOD PRESSURE 80-89 MM HG: ICD-10-PCS | Mod: CPTII,S$GLB,, | Performed by: PHYSICIAN ASSISTANT

## 2022-04-21 PROCEDURE — 99999 PR PBB SHADOW E&M-EST. PATIENT-LVL IV: ICD-10-PCS | Mod: PBBFAC,,, | Performed by: PHYSICIAN ASSISTANT

## 2022-04-21 RX ORDER — TRIAMCINOLONE ACETONIDE 40 MG/ML
40 INJECTION, SUSPENSION INTRA-ARTICULAR; INTRAMUSCULAR
Status: COMPLETED | OUTPATIENT
Start: 2022-04-21 | End: 2022-04-21

## 2022-04-21 RX ORDER — BUPIVACAINE HYDROCHLORIDE 2.5 MG/ML
3 INJECTION, SOLUTION INFILTRATION; PERINEURAL
Status: COMPLETED | OUTPATIENT
Start: 2022-04-21 | End: 2022-04-21

## 2022-04-21 RX ADMIN — TRIAMCINOLONE ACETONIDE 40 MG: 40 INJECTION, SUSPENSION INTRA-ARTICULAR; INTRAMUSCULAR at 01:04

## 2022-04-21 RX ADMIN — BUPIVACAINE HYDROCHLORIDE 7.5 MG: 2.5 INJECTION, SOLUTION INFILTRATION; PERINEURAL at 01:04

## 2022-04-21 NOTE — PROGRESS NOTES
Subjective:          Chief Complaint: Santiago De Luna is a 61 y.o. male who had no chief complaint listed for this encounter.    HPI   Patient who is RHD and a previous competitive swimmer presents to clinic with right shoulder pain x 6 months. Initially, he injured his shoulder lifting luggage overhead during a flight in October 2021. He then injured it again approximately 2 months ago when lifting a bike. Pain increases with overhead and cross body movements. He has noticed a decrease in his strength and range of motion and an increase in his pain. He has been taking [id number] every other day prn pain for his knee and foot pain. Pain at rest is 0/10. Pain at its worst is 4/10. He is still completing an [facility] home exercise program. He has been unable to swim in the past 6 months but is still biking frequently.     Previous hx of Right RCR in 11/2011 and Left RCR in 8/2016    Review of Systems   Constitutional: Negative. Negative for chills, fever, weight gain and weight loss.   HENT: Negative for congestion and sore throat.    Eyes: Negative for blurred vision and double vision.   Cardiovascular: Negative for chest pain, leg swelling and palpitations.   Respiratory: Negative for cough and shortness of breath.    Hematologic/Lymphatic: Does not bruise/bleed easily.   Skin: Negative for itching, poor wound healing and rash.   Musculoskeletal: Positive for joint pain. Negative for back pain, joint swelling, muscle weakness, myalgias and stiffness.   Gastrointestinal: Negative for abdominal pain, constipation, diarrhea, nausea and vomiting.   Genitourinary: Negative.  Negative for frequency and hematuria.   Neurological: Negative for dizziness, headaches, numbness, paresthesias and sensory change.   Psychiatric/Behavioral: Negative for altered mental status and depression. The patient is not nervous/anxious.    Allergic/Immunologic: Negative for hives.                   Objective:        General: Santiago is well-developed,  well-nourished, appears stated age, in no acute distress, alert and oriented to time, place and person.     General    Vitals reviewed.  Constitutional: He is oriented to person, place, and time. He appears well-developed and well-nourished. No distress.   HENT:   Head: Normocephalic.   Eyes: EOM are normal.   Cardiovascular: Normal rate and regular rhythm.    Pulmonary/Chest: Effort normal. No respiratory distress.   Neurological: He is alert and oriented to person, place, and time. He has normal reflexes. No cranial nerve deficit. Coordination normal.   Psychiatric: He has a normal mood and affect. His behavior is normal. Judgment and thought content normal.         Right Shoulder Exam     Inspection/Observation   Swelling: absent  Bruising: absent  Scars: present  Deformity: absent  Scapular Winging: absent  Scapular Dyskinesia: negative  Atrophy: absent    Tenderness   The patient is tender to palpation of the acromioclavicular joint.    Range of Motion   Active abduction:  160 normal   Passive abduction:  160 normal   Extension:  50 normal   Forward Flexion:  150 (pain) abnormal   Adduction: 80 normal  External Rotation 0 degrees:  60 (pain) normal   External Rotation 90 degrees: 90 normal  Internal rotation 0 degrees:  T12 (pain) abnormal   Internal rotation 90 degrees:  80 normal     Tests & Signs   Apprehension: negative  Cross arm: positive  Drop arm: negative  Servin test: positive  Impingement: negative  Sulcus: absent  Rotator Cuff Painful Arc/Range: mild  Lift Off Sign: negative  Active Compression Test ([place]'s Sign): negative  Yergason's Test: negative  Speed's Test: negative  Anterior Drawer Test: 1+   Posterior Drawer Test: 1+    Other   Sensation: normal    Left Shoulder Exam     Inspection/Observation   Swelling: absent  Bruising: absent  Scars: present  Deformity: absent  Scapular Winging: absent  Scapular Dyskinesia: negative  Atrophy: absent    Tenderness   The patient is experiencing no  tenderness.     Range of Motion   Active abduction:  160 normal   Passive abduction:  160 normal   Extension:  50 normal   Forward Flexion:  180 normal   Adduction: 80 normal  External Rotation 0 degrees:  60 normal   External Rotation 90 degrees: 100 normal  Internal rotation 0 degrees:  T10 normal   Internal rotation 90 degrees:  80 normal     Tests & Signs   Apprehension: negative  Cross arm: negative  Drop arm: negative  Servin test: negative  Impingement: negative  Sulcus: absent  Lift Off Sign: negative  Active Compression test ([place]'s Sign): negative  Yergasons's Test: negative  Speed's Test: negative  Anterior Drawer Test: 1+  Posterior Drawer Test: 1+    Other   Sensation: normal       Muscle Strength   Right Upper Extremity   Shoulder Abduction: 4/5 (pain 4+)   Shoulder Internal Rotation: 5/5   Shoulder External Rotation: 5/5   Supraspinatus: 4/5 (pain 4+)   Subscapularis: 5/5   Biceps: 5/5 (pain)   Left Upper Extremity  Shoulder Abduction: 5/5   Shoulder Internal Rotation: 5/5   Shoulder External Rotation: 5/5   Supraspinatus: 5/5   Subscapularis: 5/5   Biceps: 5/5     Reflexes     Left Side  Biceps:  2+  Triceps:  2+  Brachioradialis:  2+    Right Side   Biceps:  2+  Triceps:  2+  Brachioradialis:  2+    RADIOGRAPHS: 4/21/22  Right shoulder:  FINDINGS:  Bones: Osseous structures demonstrate normal mineralization.  No evidence for fracture or osseous destructive lesion.  There is postoperative change at the level of the humeral head.  Small ossific fragment along the greater tuberosity, which may represent sequela of remote trauma versus focus of calcific tendinitis.     Joints: No evidence for glenohumeral dislocation.  Degenerative change involving the glenohumeral and acromioclavicular joints, noting spurring along the inferior glenoid.     Soft tissues: Unremarkable.        Assessment:       Encounter Diagnoses   Name Primary?    Acute pain of right shoulder     History of rotator cuff surgery      Rotator cuff syndrome, right Yes          Plan:       1. I made the decision to obtain old records of the patient including previous notes and imaging. New imaging was ordered today of the extremity or extremities evaluated. I independently reviewed and interpreted the radiographs and/or MRIs today as well as prior imaging. Reviewed radiographs with patient in detail.    2. Continue Mobic 15mg    3. Injection Procedure right shoulder  A time out was performed, including verification of patient ID, procedure, site and side, availability of information and equipment, review of safety issues, and agreement with consent, the procedure site was marked.    After time out was performed, the patient was prepped aseptically with povidone-iodine swabsticks. A diagnostic and therapeutic injection of 1:3cc Kenalog/Marcaine was given under sterile technique using a 22g x 1.5 needle from the Posterior  aspect of the right Subacromial in the sitting position.      Santiago De Luna had no adverse reactions to the medication. Pain decreased. He was instructed to apply ice to the joint for 20 minutes and avoid strenuous activities for 24-36 hours following the injection. He was warned of possible blood sugar and/or blood pressure changes during that time. Following that time, he can resume regular activities.    He was reminded to call the clinic immediately for any adverse side effects as explained in clinic today.    4. We discussed at length different treatment options including conservative vs surgical management. These include anti-inflammatories, acetaminophen, rest, ice, heat, formal physical therapy including strengthening and stretching exercises, home exercise programs, dry needling, CSI, MRI, and finally surgical intervention. Patient decided to move forward with formal PT and CSI today. Will hold off on MRI at this time.     5. Referral to formal PT for RC and periscapular strengthening program at [facility]    6. RTC  in 8 weeks with Zeynep Gottlieb PA-C for follow up right shoulder if continued pain. Possible MRI if failure of CSI and formal PT.                      Patient questionnaires may have been collected.

## 2022-05-30 ENCOUNTER — PATIENT MESSAGE (OUTPATIENT)
Dept: ADMINISTRATIVE | Facility: OTHER | Age: 62
End: 2022-05-30
Payer: COMMERCIAL

## 2023-03-28 ENCOUNTER — OFFICE VISIT (OUTPATIENT)
Dept: INTERNAL MEDICINE | Facility: CLINIC | Age: 63
End: 2023-03-28
Payer: COMMERCIAL

## 2023-03-28 VITALS
OXYGEN SATURATION: 97 % | BODY MASS INDEX: 27.56 KG/M2 | HEART RATE: 64 BPM | RESPIRATION RATE: 16 BRPM | SYSTOLIC BLOOD PRESSURE: 124 MMHG | WEIGHT: 203.5 LBS | TEMPERATURE: 97 F | DIASTOLIC BLOOD PRESSURE: 68 MMHG | HEIGHT: 72 IN

## 2023-03-28 DIAGNOSIS — R35.1 NOCTURIA: ICD-10-CM

## 2023-03-28 DIAGNOSIS — Z00.00 ANNUAL PHYSICAL EXAM: Primary | ICD-10-CM

## 2023-03-28 DIAGNOSIS — R39.15 URGENCY OF URINATION: ICD-10-CM

## 2023-03-28 PROCEDURE — 1160F PR REVIEW ALL MEDS BY PRESCRIBER/CLIN PHARMACIST DOCUMENTED: ICD-10-PCS | Mod: CPTII,S$GLB,,

## 2023-03-28 PROCEDURE — 99396 PREV VISIT EST AGE 40-64: CPT | Mod: S$GLB,,,

## 2023-03-28 PROCEDURE — 99396 PR PREVENTIVE VISIT,EST,40-64: ICD-10-PCS | Mod: S$GLB,,,

## 2023-03-28 PROCEDURE — 3078F DIAST BP <80 MM HG: CPT | Mod: CPTII,S$GLB,,

## 2023-03-28 PROCEDURE — 3074F SYST BP LT 130 MM HG: CPT | Mod: CPTII,S$GLB,,

## 2023-03-28 PROCEDURE — 1159F MED LIST DOCD IN RCRD: CPT | Mod: CPTII,S$GLB,,

## 2023-03-28 PROCEDURE — 99999 PR PBB SHADOW E&M-EST. PATIENT-LVL V: ICD-10-PCS | Mod: PBBFAC,,,

## 2023-03-28 PROCEDURE — 99999 PR PBB SHADOW E&M-EST. PATIENT-LVL V: CPT | Mod: PBBFAC,,,

## 2023-03-28 PROCEDURE — 3078F PR MOST RECENT DIASTOLIC BLOOD PRESSURE < 80 MM HG: ICD-10-PCS | Mod: CPTII,S$GLB,,

## 2023-03-28 PROCEDURE — 1160F RVW MEDS BY RX/DR IN RCRD: CPT | Mod: CPTII,S$GLB,,

## 2023-03-28 PROCEDURE — 3008F PR BODY MASS INDEX (BMI) DOCUMENTED: ICD-10-PCS | Mod: CPTII,S$GLB,,

## 2023-03-28 PROCEDURE — 3074F PR MOST RECENT SYSTOLIC BLOOD PRESSURE < 130 MM HG: ICD-10-PCS | Mod: CPTII,S$GLB,,

## 2023-03-28 PROCEDURE — 1159F PR MEDICATION LIST DOCUMENTED IN MEDICAL RECORD: ICD-10-PCS | Mod: CPTII,S$GLB,,

## 2023-03-28 PROCEDURE — 3008F BODY MASS INDEX DOCD: CPT | Mod: CPTII,S$GLB,,

## 2023-03-28 NOTE — PROGRESS NOTES
Subjective     Patient ID: Santiago De Luna is a 62 y.o. male.    Chief Complaint: Establish Care and Annual Exam    62 y.o M with anxiety, prior rotator cuff tear of bilateral shoulders, ACL repair of bilateral knees presenting today for annual physical exam.     Patient reports overall feeling well, but does have a few concerns. Reports that recently has been feeling very sensitive to cold. Has been needing to wear sweaters and using additional blankets while sleeping. Has been having some increased hair loss. He denies constipation, fatigue, weight gain.     Also complains of urgency, frequency and nocturia. Has been having 3-4x episodes of nocturia. Does endorse drinking large amounts of water prior to bed. Denies dysuria, fevers, chills. No malodor or change in color of the urine. No weak stream or hesitancy. PSA one year ago WNL. He does have family hx of prostate cancer in his dad who was diagnosed at 40 y.o.    Health Maintenance:  Hep C screening: Due  HIV screening: Due  Colonoscopy: Completed 2017, next 2027  Covid: Vaccinated and Boosted x2  TDaP: Completed 2022, next in 2023  Shingles: Completed  Yearly influenza: Completed.     Social History:  Semi-retired, works as   Lives with wife  Drinks 2 beers and 2 shots of whiskey per night  Smokes marijuana occasionally   No tobacco/vaping. Never user.   Eats healthy. No fast food. Cooks 70% of meals. Minimizes red meat consumption. Mostly eats fish and veggies.   Exercise: Walks 3 miles a day for 5x a week. Lifts weight 3x a week.     Review of Systems   Constitutional:  Negative for activity change, chills, fatigue, fever and unexpected weight change.   HENT:  Negative for hearing loss, rhinorrhea and trouble swallowing.    Eyes:  Negative for discharge and visual disturbance.   Respiratory:  Negative for chest tightness, shortness of breath and wheezing.    Cardiovascular:  Negative for chest pain and palpitations.   Gastrointestinal:  Negative  for abdominal pain, blood in stool, constipation, diarrhea, nausea and vomiting.   Endocrine: Positive for cold intolerance. Negative for polydipsia and polyuria.   Genitourinary:  Positive for frequency and urgency. Negative for difficulty urinating and hematuria.   Musculoskeletal:  Positive for arthralgias (R knee, chronic pain). Negative for joint swelling and neck pain.   Integumentary:  Negative for rash and wound.   Neurological:  Negative for weakness and headaches.   Psychiatric/Behavioral:  Negative for confusion and dysphoric mood.       Objective     Physical Exam  Vitals reviewed.   Constitutional:       Appearance: Normal appearance. He is not ill-appearing.   HENT:      Head: Normocephalic and atraumatic.      Nose: Nose normal.      Mouth/Throat:      Mouth: Mucous membranes are moist.      Pharynx: Oropharynx is clear.   Eyes:      Extraocular Movements: Extraocular movements intact.      Conjunctiva/sclera: Conjunctivae normal.   Cardiovascular:      Rate and Rhythm: Normal rate and regular rhythm.      Pulses: Normal pulses.      Heart sounds: Normal heart sounds.   Pulmonary:      Effort: Pulmonary effort is normal. No respiratory distress.      Breath sounds: Normal breath sounds. No wheezing or rales.   Abdominal:      General: Bowel sounds are normal.      Palpations: Abdomen is soft.      Tenderness: There is no abdominal tenderness.   Musculoskeletal:         General: No swelling or tenderness. Normal range of motion.      Cervical back: Normal range of motion and neck supple.      Right lower leg: No edema.      Left lower leg: No edema.   Lymphadenopathy:      Cervical: No cervical adenopathy.   Skin:     General: Skin is warm and dry.      Capillary Refill: Capillary refill takes less than 2 seconds.   Neurological:      General: No focal deficit present.      Mental Status: He is alert and oriented to person, place, and time.      Sensory: No sensory deficit.      Motor: No weakness.       Gait: Gait normal.   Psychiatric:         Mood and Affect: Mood normal.         Behavior: Behavior normal.         Thought Content: Thought content normal.         Judgment: Judgment normal.        Assessment and Plan     Problem List Items Addressed This Visit    None  Visit Diagnoses       Annual physical exam    -  Primary    Relevant Orders    PSA, SCREENING    CBC W/ AUTO DIFFERENTIAL    TSH    Lipid Panel    COMPREHENSIVE METABOLIC PANEL    Hemoglobin A1C    HEPATITIS C ANTIBODY    HIV 1/2 Ag/Ab (4th Gen)    Nocturia        Relevant Orders    Hemoglobin A1C    Urinalysis    Urgency of urination        Relevant Orders    Urinalysis    Ambulatory referral/consult to Urology          Annual physical exam  - Medical Hx, Social Hx, Family Hx, Allergies reviewed  - Medications reviewed  - Previous labs reviewed  - Age appropriate counseling given  - Extensive counseling given regarding healthy diet, including limiting saturated fats and avoiding high cholesterol foods (red meats, crustaceans, etc). Also counseled on importance of dedicated exercise regimen, ideally 30 minutes for 5 days a week or total of 150 minutes per week.   - Health Maintenance reviewed, care gaps closed  - Orders placed:    -     PSA, SCREENING; Future; Expected date: 03/28/2023  -     CBC W/ AUTO DIFFERENTIAL; Future; Expected date: 03/28/2023  -     TSH; Future; Expected date: 03/28/2023  -     Lipid Panel; Future; Expected date: 03/28/2023  -     COMPREHENSIVE METABOLIC PANEL; Future; Expected date: 03/28/2023  -     Hemoglobin A1C; Future; Expected date: 03/28/2023  -     HEPATITIS C ANTIBODY; Future; Expected date: 03/28/2023  -     HIV 1/2 Ag/Ab (4th Gen); Future; Expected date: 03/28/2023    Nocturia  Urgency of urination  Patient reports 2 year history of urgency and frequency that have progressively worsened  Denies fevers, chills, dysuria, flank pain  Suspect most likely secondary to BPH with LUTS  Orders placed:     -      Hemoglobin A1C; Future; Expected date: 03/28/2023  -     Urinalysis; Future; Expected date: 03/28/2023  -     Ambulatory referral/consult to Urology; Future; Expected date: 04/04/2023      Greater than 40 minutes was spent today in review of patient's medical records, history/physical, counseling and MDM  Patient to RTC in 6 months for follow-up or earlier if needed  Case discussed with Dr. Simone Crawford MD  Department of Hospital Medicine   [facility]

## 2023-03-29 ENCOUNTER — LAB VISIT (OUTPATIENT)
Dept: LAB | Facility: HOSPITAL | Age: 63
End: 2023-03-29
Payer: COMMERCIAL

## 2023-03-29 DIAGNOSIS — R35.1 NOCTURIA: ICD-10-CM

## 2023-03-29 DIAGNOSIS — Z00.00 ANNUAL PHYSICAL EXAM: ICD-10-CM

## 2023-03-29 LAB
ALBUMIN SERPL BCP-MCNC: 4.4 G/DL (ref 3.5–5.2)
ALP SERPL-CCNC: 46 U/L (ref 55–135)
ALT SERPL W/O P-5'-P-CCNC: 31 U/L (ref 10–44)
ANION GAP SERPL CALC-SCNC: 9 MMOL/L (ref 8–16)
AST SERPL-CCNC: 24 U/L (ref 10–40)
BASOPHILS # BLD AUTO: 0.05 K/UL (ref 0–0.2)
BASOPHILS NFR BLD: 0.8 % (ref 0–1.9)
BILIRUB SERPL-MCNC: 1.1 MG/DL (ref 0.1–1)
BUN SERPL-MCNC: 8 MG/DL (ref 8–23)
CALCIUM SERPL-MCNC: 10.5 MG/DL (ref 8.7–10.5)
CHLORIDE SERPL-SCNC: 106 MMOL/L (ref 95–110)
CHOLEST SERPL-MCNC: 233 MG/DL (ref 120–199)
CHOLEST/HDLC SERPL: 3.3 {RATIO} (ref 2–5)
CO2 SERPL-SCNC: 24 MMOL/L (ref 23–29)
COMPLEXED PSA SERPL-MCNC: 2 NG/ML (ref 0–4)
CREAT SERPL-MCNC: 0.8 MG/DL (ref 0.5–1.4)
DIFFERENTIAL METHOD: ABNORMAL
EOSINOPHIL # BLD AUTO: 0.1 K/UL (ref 0–0.5)
EOSINOPHIL NFR BLD: 2.3 % (ref 0–8)
ERYTHROCYTE [DISTWIDTH] IN BLOOD BY AUTOMATED COUNT: 12.3 % (ref 11.5–14.5)
EST. GFR  (NO RACE VARIABLE): >60 ML/MIN/1.73 M^2
ESTIMATED AVG GLUCOSE: 100 MG/DL (ref 68–131)
GLUCOSE SERPL-MCNC: 92 MG/DL (ref 70–110)
HBA1C MFR BLD: 5.1 % (ref 4–5.6)
HCT VFR BLD AUTO: 46.2 % (ref 40–54)
HCV AB SERPL QL IA: NORMAL
HDLC SERPL-MCNC: 70 MG/DL (ref 40–75)
HDLC SERPL: 30 % (ref 20–50)
HGB BLD-MCNC: 15.5 G/DL (ref 14–18)
HIV 1+2 AB+HIV1 P24 AG SERPL QL IA: NORMAL
IMM GRANULOCYTES # BLD AUTO: 0.05 K/UL (ref 0–0.04)
IMM GRANULOCYTES NFR BLD AUTO: 0.8 % (ref 0–0.5)
LDLC SERPL CALC-MCNC: 141 MG/DL (ref 63–159)
LYMPHOCYTES # BLD AUTO: 1.9 K/UL (ref 1–4.8)
LYMPHOCYTES NFR BLD: 31.1 % (ref 18–48)
MCH RBC QN AUTO: 34 PG (ref 27–31)
MCHC RBC AUTO-ENTMCNC: 33.5 G/DL (ref 32–36)
MCV RBC AUTO: 101 FL (ref 82–98)
MONOCYTES # BLD AUTO: 0.6 K/UL (ref 0.3–1)
MONOCYTES NFR BLD: 9.9 % (ref 4–15)
NEUTROPHILS # BLD AUTO: 3.3 K/UL (ref 1.8–7.7)
NEUTROPHILS NFR BLD: 55.1 % (ref 38–73)
NONHDLC SERPL-MCNC: 163 MG/DL
NRBC BLD-RTO: 0 /100 WBC
PLATELET # BLD AUTO: 249 K/UL (ref 150–450)
PMV BLD AUTO: 9.9 FL (ref 9.2–12.9)
POTASSIUM SERPL-SCNC: 5 MMOL/L (ref 3.5–5.1)
PROT SERPL-MCNC: 7 G/DL (ref 6–8.4)
RBC # BLD AUTO: 4.56 M/UL (ref 4.6–6.2)
SODIUM SERPL-SCNC: 139 MMOL/L (ref 136–145)
TRIGL SERPL-MCNC: 110 MG/DL (ref 30–150)
TSH SERPL DL<=0.005 MIU/L-ACNC: 1.58 UIU/ML (ref 0.4–4)
WBC # BLD AUTO: 6.04 K/UL (ref 3.9–12.7)

## 2023-03-29 PROCEDURE — 83036 HEMOGLOBIN GLYCOSYLATED A1C: CPT

## 2023-03-29 PROCEDURE — 80053 COMPREHEN METABOLIC PANEL: CPT

## 2023-03-29 PROCEDURE — 84443 ASSAY THYROID STIM HORMONE: CPT

## 2023-03-29 PROCEDURE — 86803 HEPATITIS C AB TEST: CPT

## 2023-03-29 PROCEDURE — 87389 HIV-1 AG W/HIV-1&-2 AB AG IA: CPT

## 2023-03-29 PROCEDURE — 85025 COMPLETE CBC W/AUTO DIFF WBC: CPT

## 2023-03-29 PROCEDURE — 36415 COLL VENOUS BLD VENIPUNCTURE: CPT | Mod: PO

## 2023-03-29 PROCEDURE — 84153 ASSAY OF PSA TOTAL: CPT

## 2023-03-29 PROCEDURE — 80061 LIPID PANEL: CPT

## 2023-04-11 ENCOUNTER — OFFICE VISIT (OUTPATIENT)
Dept: UROLOGY | Facility: CLINIC | Age: 63
End: 2023-04-11
Payer: COMMERCIAL

## 2023-04-11 VITALS
BODY MASS INDEX: 27.09 KG/M2 | WEIGHT: 200 LBS | HEIGHT: 72 IN | SYSTOLIC BLOOD PRESSURE: 125 MMHG | HEART RATE: 64 BPM | DIASTOLIC BLOOD PRESSURE: 79 MMHG

## 2023-04-11 DIAGNOSIS — Z80.42 FAMILY HISTORY OF PROSTATE CANCER: ICD-10-CM

## 2023-04-11 DIAGNOSIS — N52.9 ERECTILE DYSFUNCTION, UNSPECIFIED ERECTILE DYSFUNCTION TYPE: ICD-10-CM

## 2023-04-11 DIAGNOSIS — N40.1 BPH WITH OBSTRUCTION/LOWER URINARY TRACT SYMPTOMS: Primary | ICD-10-CM

## 2023-04-11 DIAGNOSIS — R39.15 URGENCY OF URINATION: ICD-10-CM

## 2023-04-11 DIAGNOSIS — N13.8 BPH WITH OBSTRUCTION/LOWER URINARY TRACT SYMPTOMS: Primary | ICD-10-CM

## 2023-04-11 LAB
BILIRUB SERPL-MCNC: NEGATIVE MG/DL
BLOOD URINE, POC: NEGATIVE
CLARITY, POC UA: CLEAR
COLOR, POC UA: YELLOW
GLUCOSE UR QL STRIP: NEGATIVE
KETONES UR QL STRIP: NEGATIVE
LEUKOCYTE ESTERASE URINE, POC: NEGATIVE
NITRITE, POC UA: NEGATIVE
PH, POC UA: 5
POC RESIDUAL URINE VOLUME: 49 ML (ref 0–100)
PROTEIN, POC: NEGATIVE
SPECIFIC GRAVITY, POC UA: 1.01
UROBILINOGEN, POC UA: NEGATIVE

## 2023-04-11 PROCEDURE — 1159F MED LIST DOCD IN RCRD: CPT | Mod: CPTII,S$GLB,, | Performed by: NURSE PRACTITIONER

## 2023-04-11 PROCEDURE — 81002 POCT URINE DIPSTICK WITHOUT MICROSCOPE: ICD-10-PCS | Mod: S$GLB,,, | Performed by: NURSE PRACTITIONER

## 2023-04-11 PROCEDURE — 3008F BODY MASS INDEX DOCD: CPT | Mod: CPTII,S$GLB,, | Performed by: NURSE PRACTITIONER

## 2023-04-11 PROCEDURE — 81002 URINALYSIS NONAUTO W/O SCOPE: CPT | Mod: S$GLB,,, | Performed by: NURSE PRACTITIONER

## 2023-04-11 PROCEDURE — 1159F PR MEDICATION LIST DOCUMENTED IN MEDICAL RECORD: ICD-10-PCS | Mod: CPTII,S$GLB,, | Performed by: NURSE PRACTITIONER

## 2023-04-11 PROCEDURE — 3044F HG A1C LEVEL LT 7.0%: CPT | Mod: CPTII,S$GLB,, | Performed by: NURSE PRACTITIONER

## 2023-04-11 PROCEDURE — 3078F DIAST BP <80 MM HG: CPT | Mod: CPTII,S$GLB,, | Performed by: NURSE PRACTITIONER

## 2023-04-11 PROCEDURE — 51798 POCT BLADDER SCAN: ICD-10-PCS | Mod: S$GLB,,, | Performed by: NURSE PRACTITIONER

## 2023-04-11 PROCEDURE — 3074F PR MOST RECENT SYSTOLIC BLOOD PRESSURE < 130 MM HG: ICD-10-PCS | Mod: CPTII,S$GLB,, | Performed by: NURSE PRACTITIONER

## 2023-04-11 PROCEDURE — 99999 PR PBB SHADOW E&M-EST. PATIENT-LVL III: ICD-10-PCS | Mod: PBBFAC,,, | Performed by: NURSE PRACTITIONER

## 2023-04-11 PROCEDURE — 1160F PR REVIEW ALL MEDS BY PRESCRIBER/CLIN PHARMACIST DOCUMENTED: ICD-10-PCS | Mod: CPTII,S$GLB,, | Performed by: NURSE PRACTITIONER

## 2023-04-11 PROCEDURE — 3074F SYST BP LT 130 MM HG: CPT | Mod: CPTII,S$GLB,, | Performed by: NURSE PRACTITIONER

## 2023-04-11 PROCEDURE — 99204 PR OFFICE/OUTPT VISIT, NEW, LEVL IV, 45-59 MIN: ICD-10-PCS | Mod: S$GLB,,, | Performed by: NURSE PRACTITIONER

## 2023-04-11 PROCEDURE — 99204 OFFICE O/P NEW MOD 45 MIN: CPT | Mod: S$GLB,,, | Performed by: NURSE PRACTITIONER

## 2023-04-11 PROCEDURE — 3044F PR MOST RECENT HEMOGLOBIN A1C LEVEL <7.0%: ICD-10-PCS | Mod: CPTII,S$GLB,, | Performed by: NURSE PRACTITIONER

## 2023-04-11 PROCEDURE — 1160F RVW MEDS BY RX/DR IN RCRD: CPT | Mod: CPTII,S$GLB,, | Performed by: NURSE PRACTITIONER

## 2023-04-11 PROCEDURE — 99999 PR PBB SHADOW E&M-EST. PATIENT-LVL III: CPT | Mod: PBBFAC,,, | Performed by: NURSE PRACTITIONER

## 2023-04-11 PROCEDURE — 3008F PR BODY MASS INDEX (BMI) DOCUMENTED: ICD-10-PCS | Mod: CPTII,S$GLB,, | Performed by: NURSE PRACTITIONER

## 2023-04-11 PROCEDURE — 51798 US URINE CAPACITY MEASURE: CPT | Mod: S$GLB,,, | Performed by: NURSE PRACTITIONER

## 2023-04-11 PROCEDURE — 3078F PR MOST RECENT DIASTOLIC BLOOD PRESSURE < 80 MM HG: ICD-10-PCS | Mod: CPTII,S$GLB,, | Performed by: NURSE PRACTITIONER

## 2023-04-11 RX ORDER — TADALAFIL 5 MG/1
5 TABLET ORAL DAILY PRN
Qty: 20 TABLET | Refills: 11 | Status: SHIPPED | OUTPATIENT
Start: 2023-04-11

## 2023-04-11 RX ORDER — TAMSULOSIN HYDROCHLORIDE 0.4 MG/1
0.4 CAPSULE ORAL DAILY
Qty: 30 CAPSULE | Refills: 11 | Status: SHIPPED | OUTPATIENT
Start: 2023-04-11 | End: 2024-04-10

## 2023-04-11 NOTE — PROGRESS NOTES
CHIEF COMPLAINT:    Mr. De Luna is a 62 y.o. male presenting for   Chief Complaint   Patient presents with    Urinary Frequency     And Urgency        PRESENTING ILLNESS:    Santiago De Luna is a 62 y.o. male with a PMH of rhinitis who presents for urinary frequency and urgency.    New patient to urology department. Presents today for urinary frequency and urgency.  Also reports having nocturia 3-4 times nightly.  He reports a good urinary stream and complete bladder emptying.  These symptoms are chronic in nature.    Father had been diagnosed with slow growing prostate cancer in his 40s.  Reviewed last PSA, which is wnl.  PHAN today.    History of erectile dysfunction.  Prescribed cialis 5 mg prn, which work well for him.  Will send prescription to [facility].     REVIEW OF SYSTEMS:    Review of Systems   Constitutional:  Negative for chills and fever.   Respiratory:  Negative for shortness of breath.    Cardiovascular:  Negative for chest pain.   Gastrointestinal:  Negative for constipation and diarrhea.   Genitourinary:  Positive for frequency and urgency. Negative for dysuria, flank pain and hematuria.        Nocturia   Neurological:  Negative for dizziness and weakness.     PATIENT HISTORY:    Past Medical History:   Diagnosis Date    Anxiety disorder, unspecified     Male erectile dysfunction, unspecified        Family History   Problem Relation Age of Onset    Alcohol abuse Mother     Early death Mother     Aortic aneurysm Mother     Early death Father     Hearing loss Father     Heart disease Father     Prostate cancer Father        Allergies:  Indomethacin, Shellfish derived, Hydrocodone, and Hydrocodone-acetaminophen    Medications:    Current Outpatient Medications:     busPIRone (BUSPAR) 5 MG Tab, TAKE 1 TABLET(5 MG) BY MOUTH THREE TIMES DAILY AS NEEDED FOR ANXIETY, Disp: 90 tablet, Rfl: 3    tadalafiL (CIALIS) 5 MG tablet, Take 1 tablet (5 mg total) by mouth daily as needed for Erectile Dysfunction.,  Disp: 20 tablet, Rfl: 11    meclizine (ANTIVERT) 25 mg tablet, Take 1 tablet (25 mg total) by mouth 3 (three) times daily as needed for Dizziness. (Patient not taking: Reported on 3/28/2023), Disp: 90 tablet, Rfl: 1    meloxicam (MOBIC) 15 MG tablet, Take 1 tablet (15 mg total) by mouth once daily. (Patient not taking: Reported on 3/28/2023), Disp: 14 tablet, Rfl: 0    tamsulosin (FLOMAX) 0.4 mg Cap, Take 1 capsule (0.4 mg total) by mouth once daily., Disp: 30 capsule, Rfl: 11    PHYSICAL EXAMINATION:    Physical Exam  Vitals and nursing note reviewed.   Constitutional:       Appearance: Normal appearance. He is well-developed.   HENT:      Head: Normocephalic and atraumatic.   Eyes:      Pupils: Pupils are equal, round, and reactive to light.   Pulmonary:      Effort: Pulmonary effort is normal.   Genitourinary:     Prostate: Enlarged. Not tender.      Rectum: Normal.      Comments: No nodules, smooth prostate  Musculoskeletal:         General: Normal range of motion.      Cervical back: Normal range of motion.   Skin:     General: Skin is warm and dry.   Neurological:      Mental Status: He is alert and oriented to person, place, and time.   Psychiatric:         Behavior: Behavior normal.         LABS:    U/a performed in office today: yellow, ph 5, 1.015, otherwise unremarkable  Bladder scan performed by Nurse Le.  PVR 49 ml    Lab Results   Component Value Date    PSA 2.0 03/29/2023    PSA 1.9 03/31/2022       IMPRESSION:  Encounter Diagnoses   Name Primary?    BPH with obstruction/lower urinary tract symptoms Yes    Urgency of urination     Erectile dysfunction, unspecified erectile dysfunction type     Family history of prostate cancer          PLAN:  Problem List Items Addressed This Visit    None  Visit Diagnoses       BPH with obstruction/lower urinary tract symptoms    -  Primary    Relevant Medications    tamsulosin (FLOMAX) 0.4 mg Cap    Urgency of urination        Relevant Orders    POCT URINE  DIPSTICK WITHOUT MICROSCOPE (Completed)    POCT Bladder Scan (Completed)    Erectile dysfunction, unspecified erectile dysfunction type        Relevant Medications    tadalafiL (CIALIS) 5 MG tablet    Family history of prostate cancer                1. Bph with LUTs   Trial of flomax.  Instructed patient to take 1st dose at night when in bed due to potential 1st dose syncope episode.  Patient was also informed and educated on side effects including retrograde ejaculation.   Reviewed PSA   PHAN wnl  2. Erectile dysfunction   - continue cialis as needed, new script sent to [facility]  3. Rtc in 6 weeks    Maribel Thomas NP    I spent 30 minutes with the patient. Over 50% of the visit was spent in counseling.

## 2023-05-01 ENCOUNTER — OFFICE VISIT (OUTPATIENT)
Dept: PODIATRY | Facility: CLINIC | Age: 63
End: 2023-05-01
Payer: COMMERCIAL

## 2023-05-01 ENCOUNTER — APPOINTMENT (OUTPATIENT)
Dept: RADIOLOGY | Facility: OTHER | Age: 63
End: 2023-05-01
Attending: PODIATRIST
Payer: COMMERCIAL

## 2023-05-01 VITALS
HEIGHT: 72 IN | SYSTOLIC BLOOD PRESSURE: 150 MMHG | HEART RATE: 69 BPM | BODY MASS INDEX: 27.09 KG/M2 | DIASTOLIC BLOOD PRESSURE: 73 MMHG | WEIGHT: 200 LBS

## 2023-05-01 DIAGNOSIS — M79.675 GREAT TOE PAIN, LEFT: ICD-10-CM

## 2023-05-01 DIAGNOSIS — M20.5X2 HALLUX LIMITUS, ACQUIRED, LEFT: ICD-10-CM

## 2023-05-01 DIAGNOSIS — M79.675 GREAT TOE PAIN, LEFT: Primary | ICD-10-CM

## 2023-05-01 PROCEDURE — 3044F PR MOST RECENT HEMOGLOBIN A1C LEVEL <7.0%: ICD-10-PCS | Mod: CPTII,S$GLB,, | Performed by: PODIATRIST

## 2023-05-01 PROCEDURE — 3008F BODY MASS INDEX DOCD: CPT | Mod: CPTII,S$GLB,, | Performed by: PODIATRIST

## 2023-05-01 PROCEDURE — 3077F PR MOST RECENT SYSTOLIC BLOOD PRESSURE >= 140 MM HG: ICD-10-PCS | Mod: CPTII,S$GLB,, | Performed by: PODIATRIST

## 2023-05-01 PROCEDURE — 99999 PR PBB SHADOW E&M-EST. PATIENT-LVL III: ICD-10-PCS | Mod: PBBFAC,,, | Performed by: PODIATRIST

## 2023-05-01 PROCEDURE — 3078F DIAST BP <80 MM HG: CPT | Mod: CPTII,S$GLB,, | Performed by: PODIATRIST

## 2023-05-01 PROCEDURE — 3077F SYST BP >= 140 MM HG: CPT | Mod: CPTII,S$GLB,, | Performed by: PODIATRIST

## 2023-05-01 PROCEDURE — 99203 PR OFFICE/OUTPT VISIT, NEW, LEVL III, 30-44 MIN: ICD-10-PCS | Mod: S$GLB,,, | Performed by: PODIATRIST

## 2023-05-01 PROCEDURE — 99999 PR PBB SHADOW E&M-EST. PATIENT-LVL III: CPT | Mod: PBBFAC,,, | Performed by: PODIATRIST

## 2023-05-01 PROCEDURE — 3008F PR BODY MASS INDEX (BMI) DOCUMENTED: ICD-10-PCS | Mod: CPTII,S$GLB,, | Performed by: PODIATRIST

## 2023-05-01 PROCEDURE — 1159F MED LIST DOCD IN RCRD: CPT | Mod: CPTII,S$GLB,, | Performed by: PODIATRIST

## 2023-05-01 PROCEDURE — 1160F RVW MEDS BY RX/DR IN RCRD: CPT | Mod: CPTII,S$GLB,, | Performed by: PODIATRIST

## 2023-05-01 PROCEDURE — 1159F PR MEDICATION LIST DOCUMENTED IN MEDICAL RECORD: ICD-10-PCS | Mod: CPTII,S$GLB,, | Performed by: PODIATRIST

## 2023-05-01 PROCEDURE — 3044F HG A1C LEVEL LT 7.0%: CPT | Mod: CPTII,S$GLB,, | Performed by: PODIATRIST

## 2023-05-01 PROCEDURE — 73630 X-RAY EXAM OF FOOT: CPT | Mod: 26,LT,, | Performed by: RADIOLOGY

## 2023-05-01 PROCEDURE — 3078F PR MOST RECENT DIASTOLIC BLOOD PRESSURE < 80 MM HG: ICD-10-PCS | Mod: CPTII,S$GLB,, | Performed by: PODIATRIST

## 2023-05-01 PROCEDURE — 73630 XR FOOT COMPLETE 3 VIEW LEFT: ICD-10-PCS | Mod: 26,LT,, | Performed by: RADIOLOGY

## 2023-05-01 PROCEDURE — 1160F PR REVIEW ALL MEDS BY PRESCRIBER/CLIN PHARMACIST DOCUMENTED: ICD-10-PCS | Mod: CPTII,S$GLB,, | Performed by: PODIATRIST

## 2023-05-01 PROCEDURE — 99203 OFFICE O/P NEW LOW 30 MIN: CPT | Mod: S$GLB,,, | Performed by: PODIATRIST

## 2023-05-01 PROCEDURE — 73630 X-RAY EXAM OF FOOT: CPT | Mod: TC,LT

## 2023-05-01 NOTE — PROGRESS NOTES
PODIATRY NOTE       PATIENT ID:  Santiago De Luna is a 63 y.o. male.       CHIEF CONCERN:   Foot Problem (Swelling and pain - L great toe)               MEDICAL DECISION MAKING:          Problem List Items Addressed This Visit    None  Visit Diagnoses       Great toe pain, left    -  Primary    Relevant Orders    X-Ray Foot Complete Left (Completed)    Hallux limitus, acquired, left                    I counseled the patient on the patient's conditions, their implications and medical management.    I ordered xrays and reviewed the xrays with the patient.     We discussed non-surgical treatment options, including pharmacologic approach, protective padding which can include a prefabricated insole, shoe recommendations or modifications, and custom foot orthotics.      Another treatment option may include intra-articular steroid injection.    Patient defers. .      Call or return to clinic prn if these symptoms worsen or fail to improve as anticipated.                     HISTORY OF PRESENT ILLNESS:  Santiago De Luna is a 63 y.o. male presents to clinic with concerns of pain and swelling in the big toe joint, left  foot. Present for past month.   Getting better with pain but swelling still present. He walked three miles this morning without issues.   He reports no trauma.  Taking advil as needed.         Patient Active Problem List   Diagnosis    Complete rotator cuff tear of left shoulder    Vasomotor rhinitis           Current Outpatient Medications on File Prior to Visit   Medication Sig Dispense Refill    busPIRone (BUSPAR) 5 MG Tab TAKE 1 TABLET(5 MG) BY MOUTH THREE TIMES DAILY AS NEEDED FOR ANXIETY 90 tablet 3    tadalafiL (CIALIS) 5 MG tablet Take 1 tablet (5 mg total) by mouth daily as needed for Erectile Dysfunction. 20 tablet 11    tamsulosin (FLOMAX) 0.4 mg Cap Take 1 capsule (0.4 mg total) by mouth once daily. 30 capsule 11    meclizine (ANTIVERT) 25 mg tablet Take 1 tablet (25 mg total) by mouth 3 (three) times  daily as needed for Dizziness. (Patient not taking: Reported on 3/28/2023) 90 tablet 1    meloxicam (MOBIC) 15 MG tablet Take 1 tablet (15 mg total) by mouth once daily. (Patient not taking: Reported on 3/28/2023) 14 tablet 0     No current facility-administered medications on file prior to visit.           Review of patient's allergies indicates:   Allergen Reactions    Indomethacin      Diarrhea and gas    Shellfish derived Itching, Rash and Swelling     Mussels,clams,shrimps- rash  Swelling of face      Hydrocodone Hives, Itching and Rash    Hydrocodone-acetaminophen Itching and Rash             Review of Systems -   General ROS: negative for - chills, fever or night sweats  Respiratory ROS: no cough, shortness of breath, or wheezing  Cardiovascular ROS: no chest pain or dyspnea on exertion  Musculoskeletal ROS: positive for - joint pain and joint stiffness  negative for - muscle pain or muscular weakness  Neurological ROS: no TIA or stroke symptoms      EXAM:     Vitals:    05/01/23 1156   BP: (!) 150/73   Pulse: 69   Weight: 90.7 kg (200 lb)   Height: 6' (1.829 m)        General:  Alert and Oriented x 3;  No acute distress      Left  Lower extremity exam:    Vascular:   Dorsalis Pedis:  present   Posterior Tibial:  present  Capillary refill time:  3 seconds  Temperature of toes warm to touch  Edema:  1+       Neurological:     Sharp touch:  normal  Light touch: normal  Tinels Sign:  Absent  Mulders Click:   Absent        Dermatological:   Skin tone, color, turgor is appropriate for age and gender  Open wounds:  absent  Bruising:  absent  Redness:  minimal      Musculoskeletal:   Dorsal bony prominence noted  at the 1st MTPJ.     Limited 1st MTPJ range of motion with crepitus, no trackbound joint.    Approximately 20-30 degrees dorsiflexion unloaded/loaded.            5/1/2023 Imaging:  Impression:     Subtle lucency through the lateral margin of the base of the proximal phalanx 1st digit.  This may be related  to underlying degenerative change.  Subtle nondisplaced fracture could give this appearance if there is recent trauma.  Mild adjacent soft tissue edema.     Plantar calcaneal spur.

## 2023-06-29 ENCOUNTER — TELEPHONE (OUTPATIENT)
Dept: INTERNAL MEDICINE | Facility: CLINIC | Age: 63
End: 2023-06-29
Payer: COMMERCIAL

## 2023-06-29 DIAGNOSIS — Z71.84 COUNSELING FOR TRAVEL: Primary | ICD-10-CM

## 2023-06-29 NOTE — TELEPHONE ENCOUNTER
Referral sent to checkout team for scheduling.    Pt called. No answer. Left message advising of referral.

## 2023-06-29 NOTE — TELEPHONE ENCOUNTER
----- Message from Nicholas Álvarez sent at 6/29/2023  2:47 PM CDT -----  Contact: pt  Type:  Sooner Appointment Request    Caller is requesting a sooner appointment.  Caller declined first available appointment listed below.  Caller will not accept being placed on the waitlist and is requesting a message be sent to doctor.  Name of Caller:pt   When is the first available appointment?Aug,.  Symptoms: Immunizations/Foreign Travel - [place], [place]  Would the patient rather a call back or a response via [facility]? [facility]  Best Call Back Number:[phone]  Additional Information:   Pt Preferred Date Range: 6/29/2023 - 7/12/2023 (anytime)

## 2023-09-06 ENCOUNTER — CLINICAL SUPPORT (OUTPATIENT)
Dept: INFECTIOUS DISEASES | Facility: CLINIC | Age: 63
End: 2023-09-06

## 2023-09-06 ENCOUNTER — OFFICE VISIT (OUTPATIENT)
Dept: INFECTIOUS DISEASES | Facility: CLINIC | Age: 63
End: 2023-09-06

## 2023-09-06 VITALS
TEMPERATURE: 99 F | WEIGHT: 207.88 LBS | SYSTOLIC BLOOD PRESSURE: 136 MMHG | HEART RATE: 71 BPM | BODY MASS INDEX: 28.2 KG/M2 | DIASTOLIC BLOOD PRESSURE: 85 MMHG

## 2023-09-06 DIAGNOSIS — Z00.00 PREVENTATIVE HEALTH CARE: Primary | ICD-10-CM

## 2023-09-06 DIAGNOSIS — Z71.84 TRAVEL ADVICE ENCOUNTER: Primary | ICD-10-CM

## 2023-09-06 PROCEDURE — 90691 TYPHOID VICPS VACCINE IM: ICD-10-PCS | Mod: S$GLB,,, | Performed by: INTERNAL MEDICINE

## 2023-09-06 PROCEDURE — 90471 IMMUNIZATION ADMIN: CPT | Mod: S$GLB,,, | Performed by: INTERNAL MEDICINE

## 2023-09-06 PROCEDURE — 99402 PREV MED CNSL INDIV APPRX 30: CPT | Mod: 25,S$GLB,, | Performed by: INTERNAL MEDICINE

## 2023-09-06 PROCEDURE — 90472 PR IMMUNIZ,ADMIN,EACH ADDL: ICD-10-PCS | Mod: S$GLB,,, | Performed by: INTERNAL MEDICINE

## 2023-09-06 PROCEDURE — 99999 PR PBB SHADOW E&M-EST. PATIENT-LVL II: ICD-10-PCS | Mod: PBBFAC,,, | Performed by: INTERNAL MEDICINE

## 2023-09-06 PROCEDURE — 90472 IMMUNIZATION ADMIN EACH ADD: CPT | Mod: S$GLB,,, | Performed by: INTERNAL MEDICINE

## 2023-09-06 PROCEDURE — 90471 TYPHOID VICPS VACCINE IM: ICD-10-PCS | Mod: S$GLB,,, | Performed by: INTERNAL MEDICINE

## 2023-09-06 PROCEDURE — 90738 INACTIVATED JE VACC IM: CPT | Mod: S$GLB,,, | Performed by: INTERNAL MEDICINE

## 2023-09-06 PROCEDURE — 99402 PR PREVENT COUNSEL,INDIV,30 MIN: ICD-10-PCS | Mod: 25,S$GLB,, | Performed by: INTERNAL MEDICINE

## 2023-09-06 PROCEDURE — 90738 JAPANESE ENCEPHALITIS VACCINE: ICD-10-PCS | Mod: S$GLB,,, | Performed by: INTERNAL MEDICINE

## 2023-09-06 PROCEDURE — 90691 TYPHOID VACCINE IM: CPT | Mod: S$GLB,,, | Performed by: INTERNAL MEDICINE

## 2023-09-06 PROCEDURE — 99999 PR PBB SHADOW E&M-EST. PATIENT-LVL II: CPT | Mod: PBBFAC,,, | Performed by: INTERNAL MEDICINE

## 2023-09-06 RX ORDER — AZITHROMYCIN 500 MG/1
500 TABLET, FILM COATED ORAL DAILY
Qty: 4 TABLET | Refills: 0 | Status: SHIPPED | OUTPATIENT
Start: 2023-09-06 | End: 2023-09-10

## 2023-09-06 RX ORDER — ATOVAQUONE AND PROGUANIL HYDROCHLORIDE 250; 100 MG/1; MG/1
1 TABLET, FILM COATED ORAL DAILY
Qty: 30 TABLET | Refills: 0 | Status: SHIPPED | OUTPATIENT
Start: 2023-09-06 | End: 2023-10-06

## 2023-09-06 NOTE — PROGRESS NOTES
Patient received the Jvax vaccine in the left deltoid, and the Typhoid vaccine in the right deltoid. Pt tolerated well. Pt asked to wait in the clinic 15 minutes after injection in the event of an allergic reaction. Pt verbalized understanding. Pt left in NAD.

## 2023-09-06 NOTE — PROGRESS NOTES
INFECTIOUS DISEASE TRAVEL CLINIC  09/06/2023 9:47 AM    Subjective:      Chief Complaint: No chief complaint on file.      History of Present Illness    Patient Santiago De Luna is a 63 y.o. male who presents today for routine pretravel consultation.  Reports no significant medical history. The [place] [email or web address] then [facility]  on 10/4-10/6, then [place] on a cruise ship to [facility] 10/6-10/14 (x 6 days), arrives in [facility] (2 nights). Leaves [facility] 10/15 and returns to [facility]. Departs [facility] 10/21 and [place] [employer] [place]). Will be taking day trips.  Fly to [place] 10/24.  Activities planned include a bike ride in [place], walk around cities. The purpose of this trip is pleasure.  The patient will be lodging at hotels, and cruise ship.      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  The patient reports that they have all their childhood vaccinations.  The patient reports receipt of the following travel related vaccinations; yellow fever      [facility] in [place]- received yellow fever vaccine      Have you ever received:  YES NO DATES  Routine Childhood vaccines  [x]         []       Influenza vaccine   []         [x]     Prevnar    []         [x]     Pneumovax    []         [x]     Tetanus-diptheria -pertussis  [x]         []   2010  Hepatitis A vaccine series       [x]         []   2019  Hepatitis B vaccine series         []         []   unknown  Meningitis vaccine   []         [x]     Zoster vaccine    [x]         []    Typhoid vaccine   [x]         []      2019  Yellow fever vaccine   []         [x]   Japanese encephalitis vaccine []         [x]   Rabies vaccine   []         [x]     PMHx: visual vertigo  Med: cialis as needed, meclizine  Allergies: hydrocodone    Review of Symptoms:  Constitutional: Denies fevers, chills,  or weakness.  Cardiovascular: Denies chest pain, palpitations  Respiratory: Denies shortness of breath, cough, hemoptysis  GI: Denies nausea/vomitting, abd pain  : Denies dysuria  Musculoskeletal: Denies joint pain or myalgias.  Skin/breast: Denies rashes, lumps, lesions, or discharge.  Neurologic: Denies headache     Past Medical History:   Diagnosis Date    Anxiety disorder, unspecified     Male erectile dysfunction, unspecified        Past Surgical History:   Procedure Laterality Date    ANTERIOR CRUCIATE LIGAMENT REPAIR Bilateral     HERNIA REPAIR  09/2017       Family History   Problem Relation Age of Onset    Alcohol abuse Mother     Early death Mother     Aortic aneurysm Mother     Early death Father     Hearing loss Father     Heart disease Father     Prostate cancer Father        Social History     Socioeconomic History    Marital status:    Tobacco Use    Smoking status: Never    Smokeless tobacco: Never   Substance and Sexual Activity    Alcohol use: Yes     Alcohol/week: 10.0 standard drinks of alcohol     Types: 10 Shots of liquor per week    Drug use: Yes     Frequency: 5.0 times per week     Types: Marijuana    Sexual activity: Yes     Partners: Female     Birth control/protection: None       Review of patient's allergies indicates:   Allergen Reactions    Indomethacin      Diarrhea and gas    Shellfish derived Itching, Rash and Swelling     Mussels,clams,shrimps- rash  Swelling of face      Hydrocodone Hives, Itching and Rash    Hydrocodone-acetaminophen Itching and Rash         Objective:   VS (24h):   Vitals:    09/06/23 0937   BP: 136/85   Pulse: 71   Temp: 98.7 °F (37.1 °C)     General: Afebrile, alert, comfortable, no acute distress.   HEENT: JACKSON. EOMI, no scleral icterus.   Pulmonary: Non labored  Extremities: Moves all extremities x 4.   Skin: No jaundice, rashes, or visible lesions.   Neurological:  Alert and oriented x 4.     Glucose   Date Value Ref Range Status   03/29/2023 92  70 - 110 mg/dL Final   03/31/2022 89 70 - 110 mg/dL Final       Calcium   Date Value Ref Range Status   03/29/2023 10.5 8.7 - 10.5 mg/dL Final   03/31/2022 10.5 8.7 - 10.5 mg/dL Final   12/09/2021 9.8 8.4 - 10.3 mg/dL Final       Albumin   Date Value Ref Range Status   03/29/2023 4.4 3.5 - 5.2 g/dL Final   03/31/2022 4.3 3.5 - 5.2 g/dL Final   12/09/2021 4.3 3.4 - 5.0 g/dL Final       Total Protein   Date Value Ref Range Status   03/29/2023 7.0 6.0 - 8.4 g/dL Final   03/31/2022 6.4 6.0 - 8.4 g/dL Final       Sodium   Date Value Ref Range Status   03/29/2023 139 136 - 145 mmol/L Final   03/31/2022 140 136 - 145 mmol/L Final   12/09/2021 137 135 - 146 mmol/L Final       Potassium   Date Value Ref Range Status   03/29/2023 5.0 3.5 - 5.1 mmol/L Final   03/31/2022 4.8 3.5 - 5.1 mmol/L Final   12/09/2021 4.0 3.6 - 5.2 mmol/L Final       CO2   Date Value Ref Range Status   03/29/2023 24 23 - 29 mmol/L Final   03/31/2022 28 23 - 29 mmol/L Final     Carbon Dioxide   Date Value Ref Range Status   12/09/2021 24 24 - 32 mmol/L Final       Chloride   Date Value Ref Range Status   03/29/2023 106 95 - 110 mmol/L Final   03/31/2022 104 95 - 110 mmol/L Final       BUN   Date Value Ref Range Status   03/29/2023 8 8 - 23 mg/dL Final   03/31/2022 8 8 - 23 mg/dL Final   12/09/2021 12.0 7.0 - 25.0 mg/dL Final       Creatinine   Date Value Ref Range Status   03/29/2023 0.8 0.5 - 1.4 mg/dL Final   03/31/2022 0.7 0.5 - 1.4 mg/dL Final   12/09/2021 0.65 (L) 0.70 - 1.40 mg/dL Final       Alkaline Phosphatase   Date Value Ref Range Status   03/29/2023 46 (L) 55 - 135 U/L Final   03/31/2022 45 (L) 55 - 135 U/L Final       ALT   Date Value Ref Range Status   03/29/2023 31 10 - 44 U/L Final   03/31/2022 40 10 - 44 U/L Final   12/09/2021 40 <46 U/L Final       AST   Date Value Ref Range Status   03/29/2023 24 10 - 40 U/L Final   03/31/2022 32 10 - 40 U/L Final   12/09/2021 36 <45 U/L Final       Total Bilirubin   Date Value Ref Range Status    03/29/2023 1.1 (H) 0.1 - 1.0 mg/dL Final     Comment:     For infants and newborns, interpretation of results should be based  on gestational age, weight and in agreement with clinical  observations.    Premature Infant recommended reference ranges:  Up to 24 hours.............<8.0 mg/dL  Up to 48 hours............<12.0 mg/dL  3-5 days..................<15.0 mg/dL  6-29 days.................<15.0 mg/dL     03/31/2022 1.3 (H) 0.1 - 1.0 mg/dL Final     Comment:     For infants and newborns, interpretation of results should be based  on gestational age, weight and in agreement with clinical  observations.    Premature Infant recommended reference ranges:  Up to 24 hours.............<8.0 mg/dL  Up to 48 hours............<12.0 mg/dL  3-5 days..................<15.0 mg/dL  6-29 days.................<15.0 mg/dL     12/09/2021 1.1 <1.3 mg/dL Final       WBC   Date Value Ref Range Status   03/29/2023 6.04 3.90 - 12.70 K/uL Final   03/31/2022 5.82 3.90 - 12.70 K/uL Final       Hemoglobin   Date Value Ref Range Status   03/29/2023 15.5 14.0 - 18.0 g/dL Final   03/31/2022 14.9 14.0 - 18.0 g/dL Final       Hematocrit   Date Value Ref Range Status   03/29/2023 46.2 40.0 - 54.0 % Final   03/31/2022 44.7 40.0 - 54.0 % Final       MCV   Date Value Ref Range Status   03/29/2023 101 (H) 82 - 98 fL Final   03/31/2022 102 (H) 82 - 98 fL Final       Platelets   Date Value Ref Range Status   03/29/2023 249 150 - 450 K/uL Final   03/31/2022 254 150 - 450 K/uL Final       Lab Results   Component Value Date    CHOL 233 (H) 03/29/2023    CHOL 209 (H) 03/31/2022       Lab Results   Component Value Date    HDL 70 03/29/2023    HDL 62 03/31/2022       Lab Results   Component Value Date    LDLCALC 141.0 03/29/2023    LDLCALC 124.4 03/31/2022       Lab Results   Component Value Date    TRIG 110 03/29/2023    TRIG 113 03/31/2022       Lab Results   Component Value Date    CHOLHDL 30.0 03/29/2023    CHOLHDL 29.7 03/31/2022       HIV: No components  "found for: "HIV 1/2 AG/AB"  Hepatitis C IgG: No components found for: "HEPATITIS C"  Syphilis: No results found for: "RPR"    Hepatitis A IgG: No components found for: "HEPATITIS A IGG"  Hepatitis Bc IgG: No components found for: "HEPATITIS B CORE IGG"  Hepatitis Bs IgG:  Quantiferon: No results found for: "QUANTIFERON"        Assessment:     Pre-Travel clinic assessment  Vaccine counseling    Plan:     Patient specific risks:      The patient was provided with an extensive travel guidance packet which provides travel information specific to the patients itinerary.     The patient's medical history was reviewed and the patient was counseled on:    Precautions against development of DVT during flight.    Registering with the state department and travel insurance was recommended in case of emergency.     Destination specific risks:      -Infectious Disease risks:      Mosquito Borne pathogens:  Reviewed basic mosquito avoidance precautions including wearing long sleeve clothing and insect repellant.  to prevent insect-borne diseases (e.g., malaria, dengue).The patient was also instructed to purchase insect repellent containing DEET (25-35%; higher strengths last longer, but >35% will damage synthetic materials) and apply premethrin spray on clothing according to repellent label instructions.    An anti-malarial agent was   prescribed for malaria prophylaxis and possible side effects were reviewed. Atovaquone-proguanil 250-100 mg PO qdaily, start 2 days before expected exposure and end 7 days after last day of exposure.    Food Borne pathogens:  Reviewed basic hand, food and water sanitation precautions.  Patient instructed to take hand  on their trip. The patient was instructed to purchase Imodium over the counter to take in case diarrhea (without blood or fever) develops. Azithromycin was ordered for treatment if severe or bloody diarrhea develops and the patient was instructed on use and possible side " "effects.      STDs:  Risk of STDs reviewed with the patient. Discussed Personal protective measures     Rabies: Discussed the risk of rabies and precautions to prevent exposure. Patient is aware to seek prompt medical care should they be exposed.       -Environmental risks:     Personal and travel safety. Precautions to minimize risk/exposure to crime and motor vehicle accidents were reviewed with the patient.    Precautions against sun exposure.       -Vaccinations:  The patient's immunization history was reviewed and, based on the patient's itinerary, the following immunizations were ordered:  - bivalent covid   - TDaP   - Typhoid IM x1  - Citizen of [place] Encephalitis 0, 29 days    The patient was encouraged to contact us about any problems that may develop after immunization and possible side effects were reviewed.      The patient was instructed to contact us if problems develop after travel.    > 30 min spent with patient and >50% of time spent counseling about travel    Erica Rios MD  Infectious Diseases       At pharmacy:    Malaria prophylaxis  - Malarone, take one pill daily  Start 2 days prior to entering affected area, continue daily during travel, end 7 days after leaving affected area    Traveler's diarrhea  - bring imodium with you (available over the counter. If diarrhea severe, lasting longer than 4 days or blood in stool, take the antibiotic as we discussed. [ Azithromycin 1000 mg, take 2, 500mg pills once if needed for severe diarrhea]         The "Simple Solution" - Home made Oral Rehydration Salts (ORS) Recipe:  Six (6) level teaspoons of Sugar.  Half (1/2) level teaspoon of Salt.  One Litre of clean drinking or boiled water and then cooled - 5 cupfuls (each cup about 200 ml.)    To view your immunizations given in [place], register for an account at: [email or web address]      "

## 2023-09-07 ENCOUNTER — CLINICAL SUPPORT (OUTPATIENT)
Dept: INFECTIOUS DISEASES | Facility: CLINIC | Age: 63
End: 2023-09-07
Payer: COMMERCIAL

## 2023-09-07 DIAGNOSIS — Z00.00 PREVENTATIVE HEALTH CARE: Primary | ICD-10-CM

## 2023-09-07 PROCEDURE — 90715 TDAP VACCINE GREATER THAN OR EQUAL TO 7YO IM: ICD-10-PCS | Mod: S$GLB,,, | Performed by: INTERNAL MEDICINE

## 2023-09-07 PROCEDURE — 90715 TDAP VACCINE 7 YRS/> IM: CPT | Mod: S$GLB,,, | Performed by: INTERNAL MEDICINE

## 2023-09-07 PROCEDURE — 90471 TDAP VACCINE GREATER THAN OR EQUAL TO 7YO IM: ICD-10-PCS | Mod: S$GLB,,, | Performed by: INTERNAL MEDICINE

## 2023-09-07 PROCEDURE — 90686 FLU VACCINE (QUAD) GREATER THAN OR EQUAL TO 3YO PRESERVATIVE FREE IM: ICD-10-PCS | Mod: S$GLB,,, | Performed by: INTERNAL MEDICINE

## 2023-09-07 PROCEDURE — 90472 IMMUNIZATION ADMIN EACH ADD: CPT | Mod: S$GLB,,, | Performed by: INTERNAL MEDICINE

## 2023-09-07 PROCEDURE — 90686 IIV4 VACC NO PRSV 0.5 ML IM: CPT | Mod: S$GLB,,, | Performed by: INTERNAL MEDICINE

## 2023-09-07 PROCEDURE — 90471 IMMUNIZATION ADMIN: CPT | Mod: S$GLB,,, | Performed by: INTERNAL MEDICINE

## 2023-09-07 PROCEDURE — 90472 FLU VACCINE (QUAD) GREATER THAN OR EQUAL TO 3YO PRESERVATIVE FREE IM: ICD-10-PCS | Mod: S$GLB,,, | Performed by: INTERNAL MEDICINE

## 2023-09-07 NOTE — PROGRESS NOTES
Patient received the flu vaccine in the right deltoid, and the Tdap vaccine in the left deltoid. Pt tolerated well. Pt asked to wait in the clinic 15 minutes after injection in the event of an allergic reaction. Pt verbalized understanding. Pt left in NAD.

## 2023-10-31 ENCOUNTER — OFFICE VISIT (OUTPATIENT)
Dept: DERMATOLOGY | Facility: CLINIC | Age: 63
End: 2023-10-31
Payer: COMMERCIAL

## 2023-10-31 DIAGNOSIS — L81.4 LENTIGO: ICD-10-CM

## 2023-10-31 DIAGNOSIS — L82.1 SEBORRHEIC KERATOSES: ICD-10-CM

## 2023-10-31 DIAGNOSIS — L82.0 SEBORRHEIC KERATOSES, INFLAMED: Primary | ICD-10-CM

## 2023-10-31 PROCEDURE — 1159F MED LIST DOCD IN RCRD: CPT | Mod: CPTII,S$GLB,, | Performed by: STUDENT IN AN ORGANIZED HEALTH CARE EDUCATION/TRAINING PROGRAM

## 2023-10-31 PROCEDURE — 3044F HG A1C LEVEL LT 7.0%: CPT | Mod: CPTII,S$GLB,, | Performed by: STUDENT IN AN ORGANIZED HEALTH CARE EDUCATION/TRAINING PROGRAM

## 2023-10-31 PROCEDURE — 99203 OFFICE O/P NEW LOW 30 MIN: CPT | Mod: S$GLB,,, | Performed by: STUDENT IN AN ORGANIZED HEALTH CARE EDUCATION/TRAINING PROGRAM

## 2023-10-31 PROCEDURE — 99203 PR OFFICE/OUTPT VISIT, NEW, LEVL III, 30-44 MIN: ICD-10-PCS | Mod: S$GLB,,, | Performed by: STUDENT IN AN ORGANIZED HEALTH CARE EDUCATION/TRAINING PROGRAM

## 2023-10-31 PROCEDURE — 1160F RVW MEDS BY RX/DR IN RCRD: CPT | Mod: CPTII,S$GLB,, | Performed by: STUDENT IN AN ORGANIZED HEALTH CARE EDUCATION/TRAINING PROGRAM

## 2023-10-31 PROCEDURE — 99999 PR PBB SHADOW E&M-EST. PATIENT-LVL III: ICD-10-PCS | Mod: PBBFAC,,, | Performed by: STUDENT IN AN ORGANIZED HEALTH CARE EDUCATION/TRAINING PROGRAM

## 2023-10-31 PROCEDURE — 1160F PR REVIEW ALL MEDS BY PRESCRIBER/CLIN PHARMACIST DOCUMENTED: ICD-10-PCS | Mod: CPTII,S$GLB,, | Performed by: STUDENT IN AN ORGANIZED HEALTH CARE EDUCATION/TRAINING PROGRAM

## 2023-10-31 PROCEDURE — 3044F PR MOST RECENT HEMOGLOBIN A1C LEVEL <7.0%: ICD-10-PCS | Mod: CPTII,S$GLB,, | Performed by: STUDENT IN AN ORGANIZED HEALTH CARE EDUCATION/TRAINING PROGRAM

## 2023-10-31 PROCEDURE — 99999 PR PBB SHADOW E&M-EST. PATIENT-LVL III: CPT | Mod: PBBFAC,,, | Performed by: STUDENT IN AN ORGANIZED HEALTH CARE EDUCATION/TRAINING PROGRAM

## 2023-10-31 PROCEDURE — 1159F PR MEDICATION LIST DOCUMENTED IN MEDICAL RECORD: ICD-10-PCS | Mod: CPTII,S$GLB,, | Performed by: STUDENT IN AN ORGANIZED HEALTH CARE EDUCATION/TRAINING PROGRAM

## 2023-10-31 NOTE — PROGRESS NOTES
Subjective:      Patient ID:  Santiago De Luna is a 63 y.o. male who presents for   Chief Complaint   Patient presents with    Lesion     Scalp, L calf     Pt is here today with c/o lesion to scalp. Pt reports a rough, raised lesion that has recently appeared in the past few weeks and has not healed. No symptoms or tx.   Also c/o rough, dry lesion to L calf. Pt states that the lesion has been present for several years and has no symptoms, but he wanted to have it evaluated.       Review of Systems    Objective:   Physical Exam   Constitutional: He appears well-developed and well-nourished. No distress.   Neurological: He is alert and oriented to person, place, and time. He is not disoriented.   Psychiatric: He has a normal mood and affect.   Skin:   Areas Examined (abnormalities noted in diagram):   Scalp / Hair Palpated and Inspected  Head / Face Inspection Performed  RUE Inspected  LUE Inspection Performed                 Diagram Legend     Erythematous scaling macule/papule c/w actinic keratosis       Vascular papule c/w angioma      Pigmented verrucoid papule/plaque c/w seborrheic keratosis      Yellow umbilicated papule c/w sebaceous hyperplasia      Irregularly shaped tan macule c/w lentigo     1-2 mm smooth white papules consistent with Milia      Movable subcutaneous cyst with punctum c/w epidermal inclusion cyst      Subcutaneous movable cyst c/w pilar cyst      Firm pink to brown papule c/w dermatofibroma      Pedunculated fleshy papule(s) c/w skin tag(s)      Evenly pigmented macule c/w junctional nevus     Mildly variegated pigmented, slightly irregular-bordered macule c/w mildly atypical nevus      Flesh colored to evenly pigmented papule c/w intradermal nevus       Pink pearly papule/plaque c/w basal cell carcinoma      Erythematous hyperkeratotic cursted plaque c/w SCC      Surgical scar with no sign of skin cancer recurrence      Open and closed comedones      Inflammatory papules and pustules       Verrucoid papule consistent consistent with wart     Erythematous eczematous patches and plaques     Dystrophic onycholytic nail with subungual debris c/w onychomycosis     Umbilicated papule    Erythematous-base heme-crusted tan verrucoid plaque consistent with inflamed seborrheic keratosis     Erythematous Silvery Scaling Plaque c/w Psoriasis     See annotation      Assessment / Plan:        Seborrheic keratoses, inflamed--scalp  Seborrheic keratoses  Reassurance given to patient. No treatment is necessary.   Treatment of benign, asymptomatic lesions may be considered cosmetic.    Lentigo  Reassurance given to patient. No treatment is necessary.     - Recommended the use of daily sunscreen and counseled on its role as a preventative measure for photoaging, sunburns, and skin cancer and to prevent the worsening of photodermatoses and pigmentary disorders (eg, melasma and postinflammatory hyperpigmentation). Preferred products at least SPF 30 and are mineral based such as Elta MD tinted broad spectrum SPF 40, Neutrogenia Sheer Zinc SPF 50, CeraVe Tinted mineral SPF 30, Blue lizard mineral sunscreen, Sun Bum mineral sunscreen. Handout provided   - Also counseled on the use of photoprotective clothing, such as from Coolibar and Solumbra   - Avoiding peak sunlight hours from 10 am - 4 pm            Follow up if symptoms worsen or fail to improve.

## 2023-10-31 NOTE — PATIENT INSTRUCTIONS
Sunscreen Guidelines  Sunscreen protects your skin by absorbing and reflecting ultraviolet rays. All sunscreens have a sun protection factor (SPF) rating that indicates how long a sunscreen will remain effective on the skin.    Why protect your skin?  The sun's rays are composed of many different wavelengths, including UVA, UVB, and visible light that each affect the skin differently.    UVB: sunburn, photoaging, skin cancer (melanoma, basal cell, and squamous cell carcinomas) and modulation of the skin's immune system.    UVA: similar to above but thought to contribute more to aging; at the same dose of UVB it is less powerful however the sun has 10-20 times the levels of UVA as compared with UVB.  Visible light: implicated in causing unwanted darkening of skin, such as melasma and post-inflammatory hyperpigmentation in darker skin types     If I have dark skin, do I need to worry about the sun?    More darkly pigmented skin is more protected against UV-induced skin cancer, sunburn, and photoaging, though may still suffer from sun-related conditions, including melasma, hyperpigmentation, and other dark spots.    Sun avoidance  As a general rule, stay out of the sun as much as possible between 10 a.m. - 4 p.m.    Download the EPA UV index mattie to track the UV index by hour in your zip code.      Which sunscreen should I choose?  The best sunscreen to use varies by individual. The one that feels best on your skin and fits your lifestyle will be the one you will likely use most regularly.   Active ingredients of sunscreen vary by , and may be a chemical (such as avobenzone or oxybenzone) or physical agent (such as zinc oxide or titanium dioxide). I recommend a physical agent.  A water-resistant sunscreen is one that maintains the SPF level after 40 minutes of water exposure. A very water-resistant sunscreen maintains the SPF level after 80 minutes of water exposure.    Sunscreen: this is the last layer in  "sun protection   Be generous: 1 shot glass of sunscreen for your body, ½ teaspoon for your face/neck  Reapply every 2 hours  Broad spectrum (provides UVA/UVB protection), SPF 50 or above  Avoid spray sunscreens: less effective and have been found to contain benzene (carcinogen)    Sun protective clothing  Although sunscreen helps minimize sun damage, no sunscreen completely blocks all wavelengths of UV light. Wearing sun protective clothing such as hats, rashguards or swim shirts, and long sleeves and/or pants, as well as avoiding sun exposure from 10 a.m. to 4 p.m. will help protect your skin from overexposure and minimize sun damage. Seek shade.  Long sleeved clothing, hats, and sunglasses: makes sun protection easier, more effective, and can even be more affordable, since sunscreen needs to be reapplied frequently.    Solumbra (www.sunprectautions.LoopIt)  G[place]cork (www.ustyme)  Coolibar (www.Invoca.LoopIt)  Land's end (www.Eventstagr.am)  Hats from [employer]ammie [employer] (www.helenkaminski.com)    My Favorite Sunscreens:  Physical blockers: Can have a "white case" but in general are more effective  - Face: CeraVe tinted mineral sunscreen, Bare Minerals complexion rescue (20 shades), Elta MD (UV elements, UV physical, UV restore, etc), Tizo ultra zinc tinted, Cetaphil Sheer Mineral Face Liquid Sunscreen  - Body: Blue Lizard, Neutrogena Sheer Zinc, Eucerin Daily Protection, Aveeno Baby    "

## 2024-01-30 DIAGNOSIS — M79.671 RIGHT FOOT PAIN: Primary | ICD-10-CM

## 2024-02-06 ENCOUNTER — HOSPITAL ENCOUNTER (OUTPATIENT)
Dept: RADIOLOGY | Facility: OTHER | Age: 64
Discharge: HOME | End: 2024-02-06
Attending: PODIATRIST
Payer: COMMERCIAL

## 2024-02-06 DIAGNOSIS — M79.671 RIGHT FOOT PAIN: ICD-10-CM

## 2024-02-06 PROCEDURE — 73630 X-RAY EXAM OF FOOT: CPT | Mod: 26,RT,, | Performed by: RADIOLOGY

## 2024-02-06 PROCEDURE — 73630 X-RAY EXAM OF FOOT: CPT | Mod: TC,FY,RT

## 2024-02-08 ENCOUNTER — OFFICE VISIT (OUTPATIENT)
Dept: PODIATRY | Facility: CLINIC | Age: 64
End: 2024-02-08
Payer: COMMERCIAL

## 2024-02-08 VITALS
BODY MASS INDEX: 28.04 KG/M2 | HEIGHT: 72 IN | HEART RATE: 70 BPM | DIASTOLIC BLOOD PRESSURE: 80 MMHG | WEIGHT: 207 LBS | SYSTOLIC BLOOD PRESSURE: 144 MMHG

## 2024-02-08 DIAGNOSIS — M72.2 PLANTAR FASCIITIS: Primary | ICD-10-CM

## 2024-02-08 DIAGNOSIS — M79.671 RIGHT FOOT PAIN: ICD-10-CM

## 2024-02-08 PROCEDURE — 99999 PR PBB SHADOW E&M-EST. PATIENT-LVL III: CPT | Mod: PBBFAC,,, | Performed by: PODIATRIST

## 2024-02-08 PROCEDURE — 3079F DIAST BP 80-89 MM HG: CPT | Mod: CPTII,S$GLB,, | Performed by: PODIATRIST

## 2024-02-08 PROCEDURE — 3008F BODY MASS INDEX DOCD: CPT | Mod: CPTII,S$GLB,, | Performed by: PODIATRIST

## 2024-02-08 PROCEDURE — 1160F RVW MEDS BY RX/DR IN RCRD: CPT | Mod: CPTII,S$GLB,, | Performed by: PODIATRIST

## 2024-02-08 PROCEDURE — 1159F MED LIST DOCD IN RCRD: CPT | Mod: CPTII,S$GLB,, | Performed by: PODIATRIST

## 2024-02-08 PROCEDURE — 99213 OFFICE O/P EST LOW 20 MIN: CPT | Mod: S$GLB,,, | Performed by: PODIATRIST

## 2024-02-08 PROCEDURE — 3077F SYST BP >= 140 MM HG: CPT | Mod: CPTII,S$GLB,, | Performed by: PODIATRIST

## 2024-02-08 RX ORDER — MELOXICAM 15 MG/1
15 TABLET ORAL DAILY PRN
Qty: 30 TABLET | Refills: 0 | Status: SHIPPED | OUTPATIENT
Start: 2024-02-08 | End: 2024-03-08

## 2024-02-08 NOTE — PROGRESS NOTES
PODIATRY NOTE       PATIENT ID:  Santiago De Luna is a 63 y.o. male.       CHIEF CONCERN:   Foot Pain (Spoke with Pt wife about he getting Xray Pain on the top of my right foot. Pt had pain in foot since mid-December.)           MEDICAL DECISION MAKING:        Problem List Items Addressed This Visit    None  Visit Diagnoses       Plantar fasciitis    -  Primary    Relevant Medications    meloxicam (MOBIC) 15 MG tablet    Other Relevant Orders    ORTHOTIC DEVICE (DME)    Right foot pain        Relevant Medications    meloxicam (MOBIC) 15 MG tablet    Other Relevant Orders    ORTHOTIC DEVICE (DME)            I counseled the patient on the patient's conditions, their implications and medical management.    I ordered xrays and reviewed the xrays with the patient.     We discussed the etiology of the problem and the patient was given literature regarding plantar fasciitis and stretching.  Custom molded orthotic supports were also recommended.   Prescription provided.     Patient defer cortisone injection today.    Prescription medication as ordered today.                     HISTORY OF PRESENT ILLNESS:  Santiago De Luna is a 63 y.o. male presents to clinic with concerns of pain to the right arch of the foot since December from walking at the time but no acute trauma recalled.  He reports soreness under the arch and hurts to rotate the foot.  Also painful with first steps   Tried Advil a couple of times for relief.    He does have history of plantar fasciitis.           Patient Active Problem List   Diagnosis    Complete rotator cuff tear of left shoulder    Vasomotor rhinitis           Current Outpatient Medications on File Prior to Visit   Medication Sig Dispense Refill    busPIRone (BUSPAR) 5 MG Tab TAKE 1 TABLET(5 MG) BY MOUTH THREE TIMES DAILY AS NEEDED FOR ANXIETY 90 tablet 3    meclizine (ANTIVERT) 25 mg tablet Take 1 tablet (25 mg total) by mouth 3 (three) times daily as needed for Dizziness. 90 tablet 1    tadalafiL  (CIALIS) 5 MG tablet Take 1 tablet (5 mg total) by mouth daily as needed for Erectile Dysfunction. 20 tablet 11    tamsulosin (FLOMAX) 0.4 mg Cap Take 1 capsule (0.4 mg total) by mouth once daily. 30 capsule 11    [DISCONTINUED] meloxicam (MOBIC) 15 MG tablet Take 1 tablet (15 mg total) by mouth once daily. (Patient not taking: Reported on 2/8/2024) 14 tablet 0     No current facility-administered medications on file prior to visit.           Review of patient's allergies indicates:   Allergen Reactions    Indomethacin      Diarrhea and gas    Shellfish derived Itching, Rash and Swelling     Mussels,clams,shrimps- rash  Swelling of face      Hydrocodone Hives, Itching and Rash    Hydrocodone-acetaminophen Itching and Rash             Review of Systems -   General ROS: negative for - chills, fever or night sweats  Respiratory ROS: no cough, shortness of breath, or wheezing  Cardiovascular ROS: no chest pain or dyspnea on exertion  Musculoskeletal ROS: positive for - joint pain and joint stiffness  negative for - muscle pain or muscular weakness  Neurological ROS: no TIA or stroke symptoms      EXAM:     Vitals:    02/08/24 1417   BP: (!) 144/80   Pulse: 70   Weight: 93.9 kg (207 lb)   Height: 6' (1.829 m)        General:  Alert and Oriented x 3;  No acute distress      Lower extremity exam:    Vascular:   Dorsalis Pedis:  present   Posterior Tibial:  present  Capillary refill time:  3 seconds  Temperature of toes warm to touch  Edema:  none      Neurological:     Sharp touch:  normal  Light touch: normal  Tinels Sign:  Absent  Mulders Click:   Absent        Dermatological:   Skin tone, color, turgor is appropriate for age and gender  Open wounds:  absent  Bruising:  absent  Redness:  minimal      Musculoskeletal:   Dorsal bony prominence noted  at the 1st MTPJ.     Limited 1st MTPJ range of motion with crepitus, no trackbound joint.    Approximately 20-30 degrees dorsiflexion unloaded/loaded.  Tenderness to palpation  medial arch right foot.      High arch foot type.         2/6/2024  right foot xrays:   FINDINGS:  No fracture dislocation bone destruction seen.  There is DJD and spurs on the calcaneus.

## 2024-02-20 ENCOUNTER — PATIENT MESSAGE (OUTPATIENT)
Dept: INTERNAL MEDICINE | Facility: CLINIC | Age: 64
End: 2024-02-20
Payer: COMMERCIAL

## 2024-02-20 DIAGNOSIS — R42 VERTIGO: Primary | ICD-10-CM

## 2024-02-26 ENCOUNTER — OFFICE VISIT (OUTPATIENT)
Dept: URGENT CARE | Facility: CLINIC | Age: 64
End: 2024-02-26
Payer: COMMERCIAL

## 2024-02-26 VITALS
HEIGHT: 72 IN | SYSTOLIC BLOOD PRESSURE: 146 MMHG | WEIGHT: 207 LBS | DIASTOLIC BLOOD PRESSURE: 80 MMHG | HEART RATE: 89 BPM | TEMPERATURE: 99 F | BODY MASS INDEX: 28.04 KG/M2 | RESPIRATION RATE: 16 BRPM | OXYGEN SATURATION: 96 %

## 2024-02-26 DIAGNOSIS — S61.411A LACERATION OF RIGHT HAND WITHOUT FOREIGN BODY, INITIAL ENCOUNTER: Primary | ICD-10-CM

## 2024-02-26 PROCEDURE — 99213 OFFICE O/P EST LOW 20 MIN: CPT | Mod: S$GLB,,,

## 2024-02-26 RX ORDER — MUPIROCIN 20 MG/G
OINTMENT TOPICAL 2 TIMES DAILY
Qty: 15 G | Refills: 0 | Status: SHIPPED | OUTPATIENT
Start: 2024-02-26

## 2024-02-26 NOTE — PATIENT INSTRUCTIONS
Cleanse wound once per day with gentle soap and water. Do not use peroxide to cleanse as this will disrupt the natural skin healing process. Cover during the day with bandaid to avoid introduction of bacteria. You can leave open to air at night.     Apply antibiotic ointment twice per day x 7 days.     Alternate tylenol and ibuprofen every 4 hours as needed for pain. Always take ibuprofen with food and water to avoid GI upset. Stop taking if you develop abdominal pain or blood in stool.     Monitor for worsening signs of infection such as redness, warmth, increase in pain, purulent drainage, fevers, chills, body aches.     Return to the clinic or follow up with PCP if signs of infection occur.

## 2024-02-26 NOTE — PROGRESS NOTES
Subjective:      Patient ID: Santiago De Luna is a 63 y.o. male.    Vitals:  height is 6' (1.829 m) and weight is 93.9 kg (207 lb). His temperature is 98.6 °F (37 °C). His blood pressure is 146/80 (abnormal) and his pulse is 89. His respiration is 16 and oxygen saturation is 96%.     Chief Complaint: Laceration    Pt presents complaints of a laceration on his right hand. Injury occurred 2 days ago. Pt states he cut his hand on the metal door frame. Pain level 2.  Pt states he's been cleaning the wound since the incident.     Laceration   The incident occurred 2 days ago. The laceration is located on the Right hand. The laceration mechanism was a metal edge. The pain is at a severity of 2/10. The pain is mild. The pain has been Constant since onset. It is unknown if a foreign body is present. His tetanus status is UTD.       Constitution: Negative for chills, fatigue and fever.   Skin:  Positive for laceration. Negative for erythema.      Objective:     Physical Exam   Constitutional: He is oriented to person, place, and time. He appears well-developed.   HENT:   Head: Normocephalic and atraumatic. Head is without abrasion, without contusion and without laceration.   Ears:   Right Ear: External ear normal.   Left Ear: External ear normal.   Nose: Nose normal.   Mouth/Throat: Oropharynx is clear and moist and mucous membranes are normal.   Eyes: Conjunctivae, EOM and lids are normal. Pupils are equal, round, and reactive to light.   Neck: Trachea normal and phonation normal. Neck supple.   Cardiovascular: Normal rate, regular rhythm and normal heart sounds.   Pulmonary/Chest: Effort normal and breath sounds normal. No stridor. No respiratory distress.   Musculoskeletal: Normal range of motion.         General: Normal range of motion.        Hands:    Neurological: He is alert and oriented to person, place, and time.   Skin: Skin is warm, dry, intact and no rash. Capillary refill takes less than 2 seconds. No abrasion, No  burn, No bruising, No erythema and No ecchymosis   Psychiatric: His speech is normal and behavior is normal. Judgment and thought content normal.   Nursing note and vitals reviewed.        Assessment:     1. Laceration of right hand without foreign body, initial encounter        Plan:       Laceration of right hand without foreign body, initial encounter  -     mupirocin (BACTROBAN) 2 % ointment; Apply topically 2 (two) times daily.  Dispense: 15 g; Refill: 0      Wound cleansing completed. Laceration from metal door frame is from 2 days ago. It is superficial and not bleeding. Td is UTD. No skin closure today r/t higher risk of infection >12 hours from injury. Pt understands. Advised wound care at home and mupirocin. RTC/PCP f/u if s/s of infection occur.           Discussed results/diagnosis/plan with patient in clinic. Strict precautions given to patient to monitor for worsening signs and symptoms. Advised to follow up with PCP or specialist.  Explained side effects of medications prescribed with patient and informed him/her to discontinue use if he/she has any side effects and to inform UC or PCP if this occurs. All questions answered. Strict ED verses clinic return precautions stressed and given in depth. Advised if symptoms worsens of fail to improve he/she should go to the Emergency Room. Discharge and follow-up instructions given verbally/printed with the patient who expressed understanding and willingness to comply with my recommendations. Patient voiced understanding and in agreement with current treatment plan. Patient exits the exam room in no acute distress. Conversant and engaged during discharge discussion, verbalized understanding.

## 2024-03-08 DIAGNOSIS — M79.671 RIGHT FOOT PAIN: ICD-10-CM

## 2024-03-08 DIAGNOSIS — M72.2 PLANTAR FASCIITIS: ICD-10-CM

## 2024-03-08 RX ORDER — MELOXICAM 15 MG/1
15 TABLET ORAL DAILY PRN
Qty: 30 TABLET | Refills: 0 | Status: SHIPPED | OUTPATIENT
Start: 2024-03-08 | End: 2024-04-05 | Stop reason: SDUPTHER

## 2024-03-27 ENCOUNTER — PATIENT MESSAGE (OUTPATIENT)
Dept: OTOLARYNGOLOGY | Facility: CLINIC | Age: 64
End: 2024-03-27
Payer: COMMERCIAL

## 2024-04-05 DIAGNOSIS — M72.2 PLANTAR FASCIITIS: ICD-10-CM

## 2024-04-05 DIAGNOSIS — M79.671 RIGHT FOOT PAIN: ICD-10-CM

## 2024-04-05 RX ORDER — MELOXICAM 15 MG/1
15 TABLET ORAL DAILY PRN
Qty: 30 TABLET | Refills: 0 | Status: SHIPPED | OUTPATIENT
Start: 2024-04-05

## 2024-06-27 ENCOUNTER — OFFICE VISIT (OUTPATIENT)
Dept: PODIATRY | Facility: CLINIC | Age: 64
End: 2024-06-27
Payer: COMMERCIAL

## 2024-06-27 VITALS
BODY MASS INDEX: 28.04 KG/M2 | DIASTOLIC BLOOD PRESSURE: 90 MMHG | SYSTOLIC BLOOD PRESSURE: 164 MMHG | HEIGHT: 72 IN | HEART RATE: 76 BPM | WEIGHT: 207 LBS

## 2024-06-27 DIAGNOSIS — M79.675 TOE PAIN, LEFT: ICD-10-CM

## 2024-06-27 DIAGNOSIS — L03.039 PARONYCHIA OF TOE, UNSPECIFIED LATERALITY: ICD-10-CM

## 2024-06-27 DIAGNOSIS — L60.0 INGROWN NAIL: Primary | ICD-10-CM

## 2024-06-27 PROCEDURE — 1159F MED LIST DOCD IN RCRD: CPT | Mod: CPTII,S$GLB,, | Performed by: PODIATRIST

## 2024-06-27 PROCEDURE — 3077F SYST BP >= 140 MM HG: CPT | Mod: CPTII,S$GLB,, | Performed by: PODIATRIST

## 2024-06-27 PROCEDURE — 99214 OFFICE O/P EST MOD 30 MIN: CPT | Mod: S$GLB,,, | Performed by: PODIATRIST

## 2024-06-27 PROCEDURE — 3008F BODY MASS INDEX DOCD: CPT | Mod: CPTII,S$GLB,, | Performed by: PODIATRIST

## 2024-06-27 PROCEDURE — 99999 PR PBB SHADOW E&M-EST. PATIENT-LVL III: CPT | Mod: PBBFAC,,, | Performed by: PODIATRIST

## 2024-06-27 PROCEDURE — 3080F DIAST BP >= 90 MM HG: CPT | Mod: CPTII,S$GLB,, | Performed by: PODIATRIST

## 2024-06-27 RX ORDER — TOBRAMYCIN 3 MG/ML
SOLUTION/ DROPS OPHTHALMIC
Qty: 5 ML | Refills: 3 | Status: SHIPPED | OUTPATIENT
Start: 2024-06-27

## 2024-06-27 RX ORDER — LISDEXAMFETAMINE DIMESYLATE 30 MG/1
30 CAPSULE ORAL EVERY MORNING
COMMUNITY

## 2024-06-27 NOTE — PROGRESS NOTES
Subjective:      Patient ID: Santiago De Luna is a 64 y.o. male.    Chief Complaint: Ingrown Toenail (L great toe)    Sharp, throbbing pain left big toe/toenail.  Chronic condition present off and on throughout life.  This exacerbations been gradual onset worsening over the past few days aggravated with socks shoes pressure ambulation.  No prior medical treatment.  Self-treatment with pedicures usually works but does not this time.  Denies trauma and surgery left hallux.    Review of Systems   Constitutional: Negative for chills, diaphoresis, fever, malaise/fatigue and night sweats.   Cardiovascular:  Negative for claudication, cyanosis, leg swelling and syncope.   Skin:  Positive for nail changes. Negative for color change, dry skin, rash, suspicious lesions and unusual hair distribution.   Musculoskeletal:  Negative for falls, joint pain, joint swelling, muscle cramps, muscle weakness and stiffness.   Gastrointestinal:  Negative for constipation, diarrhea, nausea and vomiting.   Neurological:  Negative for brief paralysis, disturbances in coordination, focal weakness, numbness, paresthesias, sensory change and tremors.         Objective:      Physical Exam  Constitutional:       General: He is not in acute distress.     Appearance: He is well-developed. He is not diaphoretic.   Cardiovascular:      Pulses:           Popliteal pulses are 2+ on the right side and 2+ on the left side.        Dorsalis pedis pulses are 2+ on the right side and 2+ on the left side.        Posterior tibial pulses are 2+ on the right side and 2+ on the left side.      Comments: Capillary refill 3 seconds all toes/distal feet, all toes/both feet warm to touch.      Negative lymphadenopathy bilateral popliteal fossa and tarsal tunnel.      Negavie lower extremity edema bilateral.    Musculoskeletal:      Right ankle: No swelling, deformity, ecchymosis or lacerations. Normal range of motion. Normal pulse.      Right Achilles Tendon: Normal. No  defects. Alfred's test negative.   Lymphadenopathy:      Lower Body: No right inguinal adenopathy. No left inguinal adenopathy.      Comments: Negative lymphadenopathy bilateral popliteal fossa and tarsal tunnel.    Negative lymphangitic streaking bilateral feet/ankles/legs.   Skin:     General: Skin is warm and dry.      Capillary Refill: Capillary refill takes 2 to 3 seconds.      Coloration: Skin is not pale.      Findings: No abrasion, bruising, burn, ecchymosis, erythema, laceration, lesion or rash.      Nails: There is no clubbing.      Comments:   Visible and palpable ingrowth of toenail medial border left hallux with pain to palpation, and focal localized erythema and edema,  without ulceration, drainage, pus, tracking, fluctuance, malodor, or cardinal signs infection.       Neurological:      Mental Status: He is alert and oriented to person, place, and time.      Sensory: No sensory deficit.      Motor: No tremor, atrophy or abnormal muscle tone.      Gait: Gait normal.      Deep Tendon Reflexes:      Reflex Scores:       Patellar reflexes are 2+ on the right side and 2+ on the left side.       Achilles reflexes are 2+ on the right side and 2+ on the left side.  Psychiatric:         Behavior: Behavior is cooperative.           Assessment:       Encounter Diagnoses   Name Primary?    Ingrown nail Yes    Paronychia of toe, unspecified laterality     Toe pain, left          Plan:       Santiago was seen today for ingrown toenail.    Diagnoses and all orders for this visit:    Ingrown nail    Paronychia of toe, unspecified laterality    Toe pain, left    Other orders  -     tobramycin sulfate 0.3% (TOBREX) 0.3 % ophthalmic solution; 1-2 drops topically twice daily to affected toe(s).      I counseled the patient on his conditions, their implications and medical management.    Tobramycin drops twice daily left hallux.      Cover left hallux all times with Band-Aid or similar changing daily.      Discussed  conservative treatment with shoes of adequate dimensions, material, and style to alleviate symptoms and delay or prevent surgical intervention.      Utilizing sterile toenail clippers I aggressively debrided the offending nail border approximately 3 mm from its edge and carried the nail plate incision down at an angle in order to wedge out the offending cryptotic portion of the nail plate. The offending border was then removed in toto. The area was cleansed with alcohol. Patient tolerated the procedure well and related significant relief.          Follow up if symptoms worsen or fail to improve.

## 2024-07-16 DIAGNOSIS — M72.2 PLANTAR FASCIITIS: ICD-10-CM

## 2024-07-16 DIAGNOSIS — M79.671 RIGHT FOOT PAIN: ICD-10-CM

## 2024-07-16 RX ORDER — MELOXICAM 15 MG/1
15 TABLET ORAL DAILY PRN
Qty: 30 TABLET | Refills: 0 | Status: SHIPPED | OUTPATIENT
Start: 2024-07-16

## 2024-08-14 ENCOUNTER — TELEPHONE (OUTPATIENT)
Dept: SPORTS MEDICINE | Facility: CLINIC | Age: 64
End: 2024-08-14
Payer: COMMERCIAL

## 2024-08-14 ENCOUNTER — PATIENT MESSAGE (OUTPATIENT)
Dept: SPORTS MEDICINE | Facility: CLINIC | Age: 64
End: 2024-08-14
Payer: COMMERCIAL

## 2024-08-14 DIAGNOSIS — M25.511 CHRONIC RIGHT SHOULDER PAIN: Primary | ICD-10-CM

## 2024-08-14 DIAGNOSIS — G89.29 CHRONIC RIGHT SHOULDER PAIN: Primary | ICD-10-CM

## 2024-08-14 NOTE — TELEPHONE ENCOUNTER
Left a voicemail for the patient in regard to their appointment tomorrow 8/15 with Zeynep Gottlieb PA-C. The purpose of the call was to inform the patient that their provider order x-ray of the right shoulder. The x-ray is scheduled at 11:00 am and your 11:30 am office visit will follow soon after. The patient can call the office at [phone] if they have any questions or concerns.

## 2024-08-15 ENCOUNTER — OFFICE VISIT (OUTPATIENT)
Dept: SPORTS MEDICINE | Facility: CLINIC | Age: 64
End: 2024-08-15
Payer: COMMERCIAL

## 2024-08-15 ENCOUNTER — HOSPITAL ENCOUNTER (OUTPATIENT)
Dept: RADIOLOGY | Facility: HOSPITAL | Age: 64
Discharge: HOME | End: 2024-08-15
Attending: PHYSICIAN ASSISTANT
Payer: COMMERCIAL

## 2024-08-15 VITALS
WEIGHT: 205.5 LBS | DIASTOLIC BLOOD PRESSURE: 76 MMHG | SYSTOLIC BLOOD PRESSURE: 122 MMHG | BODY MASS INDEX: 27.83 KG/M2 | HEART RATE: 63 BPM | HEIGHT: 72 IN

## 2024-08-15 DIAGNOSIS — G89.29 CHRONIC RIGHT SHOULDER PAIN: ICD-10-CM

## 2024-08-15 DIAGNOSIS — Z98.890 S/P ARTHROSCOPY OF RIGHT SHOULDER: ICD-10-CM

## 2024-08-15 DIAGNOSIS — M25.511 CHRONIC RIGHT SHOULDER PAIN: ICD-10-CM

## 2024-08-15 DIAGNOSIS — M19.011 PRIMARY OSTEOARTHRITIS, RIGHT SHOULDER: ICD-10-CM

## 2024-08-15 DIAGNOSIS — M25.811 IMPINGEMENT OF RIGHT SHOULDER: Primary | ICD-10-CM

## 2024-08-15 PROCEDURE — 73030 X-RAY EXAM OF SHOULDER: CPT | Mod: 26,RT,, | Performed by: RADIOLOGY

## 2024-08-15 PROCEDURE — 73030 X-RAY EXAM OF SHOULDER: CPT | Mod: TC,PN,RT

## 2024-08-15 PROCEDURE — 99999 PR PBB SHADOW E&M-EST. PATIENT-LVL III: CPT | Mod: PBBFAC,,, | Performed by: PHYSICIAN ASSISTANT

## 2024-08-15 RX ORDER — MELOXICAM 15 MG/1
15 TABLET ORAL DAILY
Qty: 30 TABLET | Refills: 2 | Status: SHIPPED | OUTPATIENT
Start: 2024-08-15 | End: 2024-11-13

## 2024-08-15 RX ORDER — BUPIVACAINE HYDROCHLORIDE 5 MG/ML
3 INJECTION, SOLUTION PERINEURAL
Status: COMPLETED | OUTPATIENT
Start: 2024-08-15 | End: 2024-08-15

## 2024-08-15 RX ORDER — TRIAMCINOLONE ACETONIDE 40 MG/ML
40 INJECTION, SUSPENSION INTRA-ARTICULAR; INTRAMUSCULAR
Status: COMPLETED | OUTPATIENT
Start: 2024-08-15 | End: 2024-08-15

## 2024-08-15 RX ADMIN — TRIAMCINOLONE ACETONIDE 40 MG: 40 INJECTION, SUSPENSION INTRA-ARTICULAR; INTRAMUSCULAR at 12:08

## 2024-08-15 RX ADMIN — BUPIVACAINE HYDROCHLORIDE 15 MG: 5 INJECTION, SOLUTION PERINEURAL at 12:08

## 2024-08-15 NOTE — PROGRESS NOTES
Subjective:          Chief Complaint: Santiago De Luna is a 64 y.o. male who had concerns including Pain of the Right Shoulder.    Patient who is RHD and a previous competitive swimmer presents to clinic with right shoulder pain x 2 months. Denies a specific MARS. He states that he woke up with pain. He has been seen by me in the past on 4/21/22 and received a right shoulder CSI with complete relief of pain until 2 months ago. Previously, he injured his shoulder lifting luggage overhead during a flight in October 2021. He then injured it again when lifting a bike. Pain increases with reaching out. He has been taking Mobic 15mg prn pain for his knee, foot, and shoulder pain, but he needs a refill. Pain at rest is 0.5/10. Pain at its worst is severe. He is still completing an UE home exercise program. He began increasing his UE weight lifting over the past 1.5 years.     Previous hx of Right RCR in 11/2011 and Left RCR in 8/2016    Pain  Pertinent negatives include no abdominal pain, chest pain, chills, congestion, coughing, fever, headaches, joint swelling, myalgias, nausea, numbness, rash, sore throat or vomiting.          Review of Systems   Constitutional: Negative. Negative for chills, fever, weight gain and weight loss.   HENT:  Negative for congestion and sore throat.    Eyes:  Negative for blurred vision and double vision.   Cardiovascular:  Negative for chest pain, leg swelling and palpitations.   Respiratory:  Negative for cough and shortness of breath.    Hematologic/Lymphatic: Does not bruise/bleed easily.   Skin:  Negative for itching, poor wound healing and rash.   Musculoskeletal:  Positive for joint pain. Negative for back pain, joint swelling, muscle weakness, myalgias and stiffness.   Gastrointestinal:  Negative for abdominal pain, constipation, diarrhea, nausea and vomiting.   Genitourinary: Negative.  Negative for frequency and hematuria.   Neurological:  Negative for dizziness, headaches, numbness,  paresthesias and sensory change.   Psychiatric/Behavioral:  Negative for altered mental status and depression. The patient is not nervous/anxious.    Allergic/Immunologic: Negative for hives.                   Objective:        General: Santiago is well-developed, well-nourished, appears stated age, in no acute distress, alert and oriented to time, place and person.     General    Vitals reviewed.  Constitutional: He is oriented to person, place, and time. He appears well-developed and well-nourished. No distress.   HENT:   Head: Normocephalic.   Eyes: EOM are normal.   Cardiovascular:  Normal rate and regular rhythm.            Pulmonary/Chest: Effort normal. No respiratory distress.   Neurological: He is alert and oriented to person, place, and time. He has normal reflexes. No cranial nerve deficit. Coordination normal.   Psychiatric: He has a normal mood and affect. His behavior is normal. Judgment and thought content normal.         Right Shoulder Exam     Inspection/Observation   Swelling: absent  Bruising: absent  Scars: present  Deformity: absent  Scapular Winging: absent  Scapular Dyskinesia: negative  Atrophy: absent    Tenderness   The patient is experiencing no tenderness.    Range of Motion   Active abduction:  160 (pain) normal   Passive abduction:  160 (pain) normal   Extension:  50 normal   Forward Flexion:  180 (pain) normal   Adduction: 60 normal  External Rotation 0 degrees:  60 (pain) normal   External Rotation 90 degrees: 90 normal  Internal rotation 0 degrees:  T10 (pain) normal   Internal rotation 90 degrees:  80 normal     Tests & Signs   Apprehension: positive  Cross arm: negative  Drop arm: negative  Servin test: positive  Impingement: negative  Sulcus: absent  Rotator Cuff Painful Arc/Range: mild  Lift Off Sign: negative  Active Compression Test (Topmost's Sign): negative  Yergason's Test: negative  Speed's Test: negative  Anterior Drawer Test: 1+   Posterior Drawer Test: 1+    Other    Sensation: normal    Left Shoulder Exam     Inspection/Observation   Swelling: absent  Bruising: absent  Scars: present  Deformity: absent  Scapular Winging: absent  Scapular Dyskinesia: negative  Atrophy: absent    Tenderness   The patient is experiencing no tenderness.     Range of Motion   Active abduction:  160 normal   Passive abduction:  160 normal   Extension:  50 normal   Forward Flexion:  180 normal   Adduction: 60 normal  External Rotation 0 degrees:  60 normal   External Rotation 90 degrees: 100 normal  Internal rotation 0 degrees:  T10 normal   Internal rotation 90 degrees:  80 normal     Tests & Signs   Apprehension: negative  Cross arm: negative  Drop arm: negative  Servin test: negative  Impingement: negative  Sulcus: absent  Lift Off Sign: negative  Active Compression test ([place]'s Sign): negative  Yergasons's Test: negative  Speed's Test: negative  Anterior Drawer Test: 1+  Posterior Drawer Test: 1+    Other   Sensation: normal       Muscle Strength   Right Upper Extremity   Shoulder Abduction: 4/5 (pain 4+)   Shoulder Internal Rotation: 5/5   Shoulder External Rotation: 5/5   Supraspinatus: 4/5 (pain 4+)   Subscapularis: 5/5   Biceps: 5/5 (pain)   Left Upper Extremity  Shoulder Abduction: 5/5   Shoulder Internal Rotation: 5/5   Shoulder External Rotation: 5/5   Supraspinatus: 5/5   Subscapularis: 5/5   Biceps: 5/5     Reflexes     Left Side  Biceps:  2+  Triceps:  2+  Brachioradialis:  2+    Right Side   Biceps:  2+  Triceps:  2+  Brachioradialis:  2+    RADIOGRAPHS: 8/15/24  Right shoulder:  FINDINGS:  Mild glenohumeral joint space narrowing.  Small osteophyte at the inferior glenohumeral joint.     Postoperative changes of prior rotator cuff repair.     Normal humeral head contour.     The acromioclavicular joint appears normal, mild degenerative change.     No fracture, no osseous lesions.     The right upper thoracic region and remainder of the visualized soft tissues and osseous  structures appear normal        Assessment:       Encounter Diagnoses   Name Primary?    Chronic right shoulder pain     S/P arthroscopy of right shoulder     Impingement of right shoulder Yes    Primary osteoarthritis, right shoulder             Plan:       1. I made the decision to obtain old records of the patient including previous notes and imaging. New imaging was ordered today of the extremity or extremities evaluated. I independently reviewed and interpreted the radiographs and/or MRIs today as well as prior imaging. Reviewed radiographs with patient in detail.    2. Continue Mobic 15mg prn. Refill sent to pharmacy.    3. Injection Procedure right shoulder  A time out was performed, including verification of patient ID, procedure, site and side, availability of information and equipment, review of safety issues, and agreement with consent, the procedure site was marked.    After time out was performed, the patient was prepped aseptically with povidone-iodine swabsticks. A diagnostic and therapeutic injection of 1:3cc Kenalog/Marcaine was given under sterile technique using a 22g x 1.5 needle from the Posterior aspect of the right Subacromial in the sitting position.      Santiago De Luna had no adverse reactions to the medication. Pain decreased. He was instructed to apply ice to the joint for 20 minutes and avoid strenuous activities for 24-36 hours following the injection. He was warned of possible blood sugar and/or blood pressure changes during that time. Following that time, he can resume regular activities.    He was reminded to call the clinic immediately for any adverse side effects as explained in clinic today.    4. We discussed at length different treatment options including conservative vs surgical management. These include anti-inflammatories, acetaminophen, rest, ice, heat, formal physical therapy including strengthening and stretching exercises, home exercise programs, dry needling, CSI, MRI, and  finally surgical intervention. Patient decided to move forward with HEP and CSI today. Will hold off on MRI at this time.     5. Continue RC and periscapular HEP    6. RTC prn with Zeynep Gottlieb PA-C for follow up right shoulder if continued pain. Possible MRI if failure of CSI and formal PT.                      Patient questionnaires may have been collected.

## 2024-09-24 ENCOUNTER — OFFICE VISIT (OUTPATIENT)
Dept: ORTHOPEDICS | Facility: CLINIC | Age: 64
End: 2024-09-24
Payer: COMMERCIAL

## 2024-09-24 ENCOUNTER — HOSPITAL ENCOUNTER (OUTPATIENT)
Dept: RADIOLOGY | Facility: HOSPITAL | Age: 64
Discharge: HOME | End: 2024-09-24
Attending: PHYSICIAN ASSISTANT
Payer: COMMERCIAL

## 2024-09-24 DIAGNOSIS — M25.511 CHRONIC RIGHT SHOULDER PAIN: Primary | ICD-10-CM

## 2024-09-24 DIAGNOSIS — G89.29 CHRONIC RIGHT SHOULDER PAIN: Primary | ICD-10-CM

## 2024-09-24 DIAGNOSIS — M75.41 IMPINGEMENT SYNDROME OF RIGHT SHOULDER: ICD-10-CM

## 2024-09-24 DIAGNOSIS — M25.511 RIGHT SHOULDER PAIN, UNSPECIFIED CHRONICITY: ICD-10-CM

## 2024-09-24 DIAGNOSIS — Z98.890 S/P ARTHROSCOPY OF RIGHT SHOULDER: ICD-10-CM

## 2024-09-24 PROCEDURE — 99999 PR PBB SHADOW E&M-EST. PATIENT-LVL III: CPT | Mod: PBBFAC,,, | Performed by: PHYSICIAN ASSISTANT

## 2024-09-24 PROCEDURE — 1160F RVW MEDS BY RX/DR IN RCRD: CPT | Mod: CPTII,S$GLB,, | Performed by: PHYSICIAN ASSISTANT

## 2024-09-24 PROCEDURE — 1159F MED LIST DOCD IN RCRD: CPT | Mod: CPTII,S$GLB,, | Performed by: PHYSICIAN ASSISTANT

## 2024-09-24 PROCEDURE — 73030 X-RAY EXAM OF SHOULDER: CPT | Mod: 26,RT,, | Performed by: RADIOLOGY

## 2024-09-24 PROCEDURE — 73030 X-RAY EXAM OF SHOULDER: CPT | Mod: TC,RT

## 2024-09-24 PROCEDURE — 99203 OFFICE O/P NEW LOW 30 MIN: CPT | Mod: S$GLB,,, | Performed by: PHYSICIAN ASSISTANT

## 2024-09-24 RX ORDER — DIAZEPAM 5 MG/1
5 TABLET ORAL
Qty: 2 TABLET | Refills: 0 | Status: SHIPPED | OUTPATIENT
Start: 2024-09-24 | End: 2024-09-26

## 2024-09-24 NOTE — PROGRESS NOTES
SUBJECTIVE:     Chief Complaint & History of Present Illness:  Santiago De Luna is a 64 y.o. year old RHD male who presents today with right shoulder pain.  He was catching himself from falling on an airplane seat yesterday and when he leaned onto his arm he felt a tearing sensation in his shoulder.  His pain and mobility has significantly worsened since this occurred.  Most recently he was seen in the sports medicine clinic on 8/15 and had a steroid injection with about 75% relief.  Today he is having difficulty raising his arm.  The pain is mostly in the anterior shoulder worse with reaching or lifting.    History of right RCR 11/2011 and left RCR 8/2016    Review of patient's allergies indicates:   Allergen Reactions    Indomethacin      Diarrhea and gas    Shellfish derived Itching, Rash and Swelling     Mussels,clams,shrimps- rash  Swelling of face      Hydrocodone Hives, Itching and Rash    Hydrocodone-acetaminophen Itching and Rash    Mupirocin Diarrhea, Nausea And Vomiting and Other (See Comments)         Current Outpatient Medications   Medication Sig Dispense Refill    busPIRone (BUSPAR) 5 MG Tab TAKE 1 TABLET(5 MG) BY MOUTH THREE TIMES DAILY AS NEEDED FOR ANXIETY (Patient not taking: Reported on 8/15/2024) 90 tablet 3    lisdexamfetamine (VYVANSE) 30 MG capsule Take 30 mg by mouth once daily.      meclizine (ANTIVERT) 25 mg tablet Take 1 tablet (25 mg total) by mouth 3 (three) times daily as needed for Dizziness. (Patient not taking: Reported on 2/26/2024) 90 tablet 1    meloxicam (MOBIC) 15 MG tablet Take 1 tablet (15 mg total) by mouth daily as needed for Pain. (Patient not taking: Reported on 8/15/2024) 30 tablet 0    meloxicam (MOBIC) 15 MG tablet Take 1 tablet (15 mg total) by mouth once daily. 30 tablet 2    mupirocin (BACTROBAN) 2 % ointment Apply topically 2 (two) times daily. 15 g 0    tadalafiL (CIALIS) 5 MG tablet Take 1 tablet (5 mg total) by mouth daily as needed for Erectile Dysfunction.  (Patient not taking: Reported on 8/15/2024) 20 tablet 11    tamsulosin (FLOMAX) 0.4 mg Cap Take 1 capsule (0.4 mg total) by mouth once daily. (Patient not taking: Reported on 2/26/2024) 30 capsule 11    tobramycin sulfate 0.3% (TOBREX) 0.3 % ophthalmic solution 1-2 drops topically twice daily to affected toe(s). 5 mL 3     No current facility-administered medications for this visit.       Past Medical History:   Diagnosis Date    Anxiety disorder, unspecified     Male erectile dysfunction, unspecified        Past Surgical History:   Procedure Laterality Date    ANTERIOR CRUCIATE LIGAMENT REPAIR Bilateral     HERNIA REPAIR  09/2017       Review of Systems:  ROS:  Constitutional: no fever or chills  Eyes: no visual changes  ENT: no nasal congestion or sore throat  Respiratory: no cough or shortness of breath  Musculoskeletal: no arthralgias or myalgias      OBJECTIVE:     PHYSICAL EXAM:    General:   There is no height or weight on file to calculate BMI.  Patient is alert, awake and oriented to time, place and person. Mood and affect are appropriate.  Patient does not appear to be in any distress, denies any constitutional symptoms and appears stated age.   HEENT: Pupils are equal and round, sclera are not injected. External examination of ears and nose reveals no abnormalities. Cranial nerves II-X are grossly intact  Neck: examination demonstrates painless  active range of motion. Spurling's sign is negative  Skin: no rashes, abrasions or open wounds on the affected extremity   Resp: No respiratory distress or audible wheezing   Psych:  normal mood and behavior  CV: 2+  pulses, all extremities warm and well perfused   Right Shoulder   Scapular winging-  Scapular dyskinesia -  Tenderness: lateral acromial  Range of motion is painful   ROM: FE passive 140/limited active ROM,  ER 50, IR L5  Shoulder Strength: biceps 5/5, triceps 5/5, abduction 4/5, adduction 5/5  positive for impingement sign, sensory exam normal, and  motor exam normal  Stability tests: normal  Special Tests:    Crossbody test: positive    Neer's positive  Hawkin's positive    Kike's positive  Drop arm negative      IMAGING:  X-rays of the right shoulder, personally reviewed by me, demonstrate no acute abnormality.  No fracture or dislocation.     ASSESSMENT     1. Chronic right shoulder pain    2. Impingement syndrome of right shoulder    3. S/P arthroscopy of right shoulder        PLAN:     Discussed with the patient at length all the different treatment options available for his right shoulder including anti-inflammatories, acetaminophen, rest, ice, Physical therapy, occasional cortisone injections for temporary relief, and shoulder arthroscopy    - Chronic right shoulder pain with acute injury yesterday and significant weakness on examination today.  - Will proceed with MRI right shoulder.  I will reach out to Zeynep once the MRI is complete and have him follow up at sports for further treatment  - Will call to discuss results

## 2024-09-26 ENCOUNTER — HOSPITAL ENCOUNTER (OUTPATIENT)
Dept: RADIOLOGY | Facility: HOSPITAL | Age: 64
Discharge: HOME | End: 2024-09-26
Attending: PHYSICIAN ASSISTANT
Payer: COMMERCIAL

## 2024-09-26 DIAGNOSIS — M75.41 IMPINGEMENT SYNDROME OF RIGHT SHOULDER: ICD-10-CM

## 2024-09-26 DIAGNOSIS — Z98.890 S/P ARTHROSCOPY OF RIGHT SHOULDER: ICD-10-CM

## 2024-09-26 DIAGNOSIS — G89.29 CHRONIC RIGHT SHOULDER PAIN: ICD-10-CM

## 2024-09-26 DIAGNOSIS — M25.511 CHRONIC RIGHT SHOULDER PAIN: ICD-10-CM

## 2024-09-26 PROCEDURE — 73221 MRI JOINT UPR EXTREM W/O DYE: CPT | Mod: 26,RT,, | Performed by: RADIOLOGY

## 2024-09-26 PROCEDURE — 73221 MRI JOINT UPR EXTREM W/O DYE: CPT | Mod: TC,RT

## 2024-09-27 ENCOUNTER — PATIENT MESSAGE (OUTPATIENT)
Dept: SPORTS MEDICINE | Facility: CLINIC | Age: 64
End: 2024-09-27
Payer: COMMERCIAL

## 2024-09-30 ENCOUNTER — TELEPHONE (OUTPATIENT)
Dept: ORTHOPEDICS | Facility: CLINIC | Age: 64
End: 2024-09-30
Payer: COMMERCIAL

## 2024-10-01 DIAGNOSIS — R42 DIZZINESS: Primary | ICD-10-CM

## 2024-10-02 ENCOUNTER — OFFICE VISIT (OUTPATIENT)
Dept: SPORTS MEDICINE | Facility: CLINIC | Age: 64
End: 2024-10-02
Payer: COMMERCIAL

## 2024-10-02 DIAGNOSIS — M25.511 CHRONIC RIGHT SHOULDER PAIN: ICD-10-CM

## 2024-10-02 DIAGNOSIS — Z98.890 S/P ARTHROSCOPY OF RIGHT SHOULDER: ICD-10-CM

## 2024-10-02 DIAGNOSIS — M25.811 IMPINGEMENT OF RIGHT SHOULDER: Primary | ICD-10-CM

## 2024-10-02 DIAGNOSIS — G89.29 CHRONIC RIGHT SHOULDER PAIN: ICD-10-CM

## 2024-10-02 DIAGNOSIS — M75.101 NON-TRAUMATIC ROTATOR CUFF TEAR, RIGHT: ICD-10-CM

## 2024-10-02 PROCEDURE — 99214 OFFICE O/P EST MOD 30 MIN: CPT | Mod: 95,,, | Performed by: ORTHOPAEDIC SURGERY

## 2024-10-02 NOTE — PROGRESS NOTES
REPORT OF PROCEDURE    HORTENCIA SANTANA FACILITY: [facility]  BILLING #: [id number] ROOM: [place] [address]  MR #: [id number] : 1960    DATE OF PROCEDURE: 2016  SURGEON: Adithya Jerry MD    YOB: 1960.    ASSISTANT: Chayo Levy.    PREOPERATIVE DIAGNOSIS: Left shoulder rotator cuff tear.    POSTOPERATIVE DIAGNOSES  1. Left shoulder rotator cuff tear.  2. Labral tear.    PROCEDURES   1. Left shoulder arthroscopic acromioplasty,  2. Arthroscopic rotator cuff repair.  3. Labral debridement.   Telemedicine/Virtual Visit Documentation:     The patient location is: home    The chief complaint leading to consultation is: see HPI    VISIT TYPE X   Virtual visit with synchronous audio and video    Telephone E/M service      Total time spent with patient: see X trent on chart below.   More than half of the time was spent counseling or coordinating care including prognosis, differential diagnosis, risks and benefits of treatment, instructions, compliance risk reductions     EST MINUTES X   75107 5    72375 10    54455 15    50911 25 X   99215 40    NEW     92026 10    91406 20    71762 30    31440 45    33768 60    PHONE      5-10    09722 11-20    84690 21-30      H&P  Orthopaedics      SUBJECTIVE:     History of Present Illness:  Patient is a 64 y.o. male with Right shoulder recurrent pain after below procedure with MRA result review.      Review of patient's allergies indicates:   Allergen Reactions    Indomethacin      Diarrhea and gas    Shellfish derived Itching, Rash and Swelling     Mussels,clams,shrimps- rash  Swelling of face      Hydrocodone Hives, Itching and Rash    Hydrocodone-acetaminophen Itching and Rash    Mupirocin Diarrhea, Nausea And Vomiting and Other (See Comments)       Past Medical History:   Diagnosis Date    Anxiety disorder, unspecified     Male erectile dysfunction, unspecified      Past Surgical History:   Procedure Laterality Date    ANTERIOR CRUCIATE LIGAMENT  REPAIR Bilateral     HERNIA REPAIR  09/2017     Family History   Problem Relation Name Age of Onset    Alcohol abuse Mother Debora Heart     Early death Mother Debora Heart     Aortic aneurysm Mother Debora Heart     Early death Father Mauricio De Luna     Hearing loss Father Mauricio De Luna     Heart disease Father Mauricio De Luna     Prostate cancer Father Mauricio De Luna      Social History     Tobacco Use    Smoking status: Never    Smokeless tobacco: Never   Substance Use Topics    Alcohol use: Yes     Alcohol/week: 10.0 standard drinks of alcohol     Types: 10 Shots of liquor per week    Drug use: Yes     Frequency: 5.0 times per week     Types: Marijuana        Review of Systems:  Patient denies constitutional symptoms, cardiac symptoms, respiratory symptoms, GI symptoms.  The remainder of the musculoskeletal ROS is included in the HPI.      OBJECTIVE:     Physical Exam:  Gen:  No acute distress  CV:  Peripherally well-perfused.  Pulses 2+ bilaterally.  Lungs:  Normal respiratory effort.  Abdomen:  Soft, non-tender, non-distended  Head/Neck:  Normocephalic.  Atraumatic. No TTP, AROM and PROM intact without pain  Neuro:  CN intact without deficit, SILT throughout B/L Upper & Lower Extremities    MSK:  No interval change    MRI Shoulder Without Contrast Right  Narrative: EXAMINATION:  MRI SHOULDER WITHOUT CONTRAST RIGHT    CLINICAL HISTORY:  Shoulder pain, rotator cuff disorder suspected, xray done;  Pain in right shoulder    TECHNIQUE:  Multiplanar multisequence MRI examination of RIGHT shoulder.    COMPARISON:  09/24/2024.    FINDINGS:  Evidence of prior rotator cuff repair with 2 suture anchors in place.    ROTATOR CUFF:    Supraspinatus: There is irregularity at the level of the anchor tendon interface with abnormality of the tendinous component extending for 2.4 cm    Infraspinatus: Irregularity of the infraspinatus, high-grade, greater than 50%, extending for approximately 2.4 cm.  No  tendinosis.    Subscapularis: Intact.  No tendinosis.    Teres Minor: Intact.  No tendinosis.    There is fluid within the subacromial/subdeltoid bursa consistent with bursitis predominantly subdeltoid level.    LABRUM: Circumferential labral fraying on this standard non arthrogram exam.  IGHL:Intact.    LONG HEAD BICEPS TENDON: Located within bicipital groove and intact.Biceps-labral anchor is intact. No tendinosis.  No tenosynovitis. Rotator Interval is normal. Biceps pulley is intact.    BONES: No evident fracture.Visualized marrow within normal limits. AC joint demonstrates normal alignment with moderate hypertrophy.No significant osteo-acromial outlet narrowing.  There is no evident os acromiale.    CARTILAGE: Humeral head and glenoid cartilage preserved without focal defects. No subchondral marrow edema.  No synovial abnormality or intra-articular loose bodies. Glenoid fossa demonstrates no sclerosis.    MUSCLES:  Normal bulk and signal.  Impression: S/P rotator cuff repair with findings suspicious for high-grade partial, near complete, tear manifest by tendinous irregularity of the distal aspect supraspinatus tendon for a length of 2.4 cm with significant high-grade partial irregularity of the infraspinatus for 2.4 cm.  Accompanying subacromial subdeltoid bursitis.    Electronically signed by: Jenaro Morales MD  Date:    09/27/2024  Time:    11:06      ASSESSMENT/PLAN:     A/P: Sanitago De Luna is a 64 y.o. Right shoulder persistent pain noted with recurrent rotator cuff pathology by MRA.    Plan:  1. RTC in 2-3 weeks with Advanced Practice Provider preop. H&P;  ASES and SF-12 was not filled out today in clinic. Patient will not fill out ASES, SF-12 on return.    2. Medications: Refills of the following Rx were sent to patients preferred Pharmacy:  No Refills Needed Today    3. Physical Therapy: Continue/Begin: Pre-Rehabilitation at [facility]       4. HEP: N/A    5. Procedures/Procedural Planning:   We  reviewed with Santiago today, the pathology and natural history of his diagnosis. We have discussed a variety of treatment options including medications, physical therapy and other alternative treatments. I also explained the indications, risks and benefits of surgery. After discussion, Santiago decided to proceed with surgery. The decision was made to go forward with:     Right shoulder  50576 Arthroscopy, decompression of subacromial space with partial acriomioplasty with or without coracoacromial release with ROTATOR CUFF REPAIR (BioBrace augmentation)  89594 Biceps tenodesis  27753 Arthroscopy, shoulder, debridement, extensive  54536 Arthroscopy, distal claviculectomy including distal articular surface (A-C resection)        Clearance Medically Necessary: X Yes   - PCP    Pre-Op Center Clearance Medically Necessary: X Yes          The details of the surgical procedure were explained, including the location of probable incisions and a description of likely hardware and/or grafts to be used.  The patient understands the likely convalescence after surgery.  Also, we have thoroughly discussed the risks, benefits and alternatives to surgery, including, but not limited to, the risk of infection, joint stiffness, blood clot (including DVT and/or pulmonary embolus), neurologic and vascular injury.  It was explained that, if tissue has been repaired or reconstructed, there is a chance of failure, which may require further management.      All of the patient's questions were answered and informed consent was obtained. The patient will contact us if they have any questions or concerns in the interim.    6. DME: N/A    7. Work/Sport Status: no interval change    8. Visit Summary: Above plan

## 2024-10-03 ENCOUNTER — OFFICE VISIT (OUTPATIENT)
Dept: PODIATRY | Facility: CLINIC | Age: 64
End: 2024-10-03
Payer: COMMERCIAL

## 2024-10-03 VITALS
BODY MASS INDEX: 27.83 KG/M2 | DIASTOLIC BLOOD PRESSURE: 79 MMHG | HEART RATE: 65 BPM | WEIGHT: 205.5 LBS | HEIGHT: 72 IN | SYSTOLIC BLOOD PRESSURE: 130 MMHG

## 2024-10-03 DIAGNOSIS — L60.0 INGROWN NAIL: Primary | Chronic | ICD-10-CM

## 2024-10-03 DIAGNOSIS — M79.675 TOE PAIN, LEFT: ICD-10-CM

## 2024-10-03 PROCEDURE — 3078F DIAST BP <80 MM HG: CPT | Mod: CPTII,S$GLB,, | Performed by: PODIATRIST

## 2024-10-03 PROCEDURE — 1159F MED LIST DOCD IN RCRD: CPT | Mod: CPTII,S$GLB,, | Performed by: PODIATRIST

## 2024-10-03 PROCEDURE — 1160F RVW MEDS BY RX/DR IN RCRD: CPT | Mod: CPTII,S$GLB,, | Performed by: PODIATRIST

## 2024-10-03 PROCEDURE — 3075F SYST BP GE 130 - 139MM HG: CPT | Mod: CPTII,S$GLB,, | Performed by: PODIATRIST

## 2024-10-03 PROCEDURE — 99999 PR PBB SHADOW E&M-EST. PATIENT-LVL III: CPT | Mod: PBBFAC,,, | Performed by: PODIATRIST

## 2024-10-03 PROCEDURE — 99214 OFFICE O/P EST MOD 30 MIN: CPT | Mod: S$GLB,,, | Performed by: PODIATRIST

## 2024-10-03 PROCEDURE — 3008F BODY MASS INDEX DOCD: CPT | Mod: CPTII,S$GLB,, | Performed by: PODIATRIST

## 2024-10-03 RX ORDER — NEOMYCIN SULFATE, POLYMYXIN B SULFATE AND HYDROCORTISONE 10; 3.5; 1 MG/ML; MG/ML; [USP'U]/ML
2 SUSPENSION/ DROPS AURICULAR (OTIC) 2 TIMES DAILY
Qty: 10 ML | Refills: 0 | Status: SHIPPED | OUTPATIENT
Start: 2024-10-03

## 2024-10-03 NOTE — PROGRESS NOTES
Subjective:      Patient ID: Santiago De Luna is a 64 y.o. male.    Chief Complaint: Ingrown Toenail (Left big toe ingrown )    Sharp, throbbing pain left big toe/toenail.  Chronic condition present off and on, This exacerbations been gradual onset worsening over the past couple of weeks.  aggravated  by direct pressure.   Denies trauma and surgery left hallux.   He is considering a more permanent procedure.    Patient Active Problem List   Diagnosis    Complete rotator cuff tear of left shoulder    Vasomotor rhinitis    Non-traumatic rotator cuff tear, right    S/P arthroscopy of right shoulder    Chronic right shoulder pain    Impingement of right shoulder       Current Outpatient Medications on File Prior to Visit   Medication Sig Dispense Refill    busPIRone (BUSPAR) 5 MG Tab TAKE 1 TABLET(5 MG) BY MOUTH THREE TIMES DAILY AS NEEDED FOR ANXIETY 90 tablet 3    meclizine (ANTIVERT) 25 mg tablet Take 1 tablet (25 mg total) by mouth 3 (three) times daily as needed for Dizziness. 90 tablet 1    meloxicam (MOBIC) 15 MG tablet Take 1 tablet (15 mg total) by mouth daily as needed for Pain. 30 tablet 0    meloxicam (MOBIC) 15 MG tablet Take 1 tablet (15 mg total) by mouth once daily. 30 tablet 2    tadalafiL (CIALIS) 5 MG tablet Take 1 tablet (5 mg total) by mouth daily as needed for Erectile Dysfunction. 20 tablet 11    diazePAM (VALIUM) 5 MG tablet Take 1 tablet (5 mg total) by mouth as needed for Anxiety. 2 tablet 0    lisdexamfetamine (VYVANSE) 30 MG capsule Take 30 mg by mouth once daily.      mupirocin (BACTROBAN) 2 % ointment Apply topically 2 (two) times daily. 15 g 0    tamsulosin (FLOMAX) 0.4 mg Cap Take 1 capsule (0.4 mg total) by mouth once daily. (Patient not taking: Reported on 2/26/2024) 30 capsule 11    tobramycin sulfate 0.3% (TOBREX) 0.3 % ophthalmic solution 1-2 drops topically twice daily to affected toe(s). 5 mL 3     No current facility-administered medications on file prior to visit.         Review of  patient's allergies indicates:   Allergen Reactions    Indomethacin      Diarrhea and gas    Shellfish derived Itching, Rash and Swelling     Mussels,clams,shrimps- rash  Swelling of face      Hydrocodone Hives, Itching and Rash    Hydrocodone-acetaminophen Itching and Rash    Mupirocin Diarrhea, Nausea And Vomiting and Other (See Comments)         Review of Systems   Constitutional: Negative for chills, diaphoresis, fever, malaise/fatigue and night sweats.   Cardiovascular:  Negative for claudication, cyanosis, leg swelling and syncope.   Skin:  Positive for nail changes. Negative for color change, dry skin, rash, suspicious lesions and unusual hair distribution.   Musculoskeletal:  Negative for falls, joint pain, joint swelling, muscle cramps, muscle weakness and stiffness.   Gastrointestinal:  Negative for constipation, diarrhea, nausea and vomiting.   Neurological:  Negative for brief paralysis, disturbances in coordination, focal weakness, numbness, paresthesias, sensory change and tremors.           Objective:      Vitals:    10/03/24 1202   BP: 130/79   Pulse: 65   Weight: 93.2 kg (205 lb 7.5 oz)   Height: 6' (1.829 m)        Physical Exam  Constitutional:       General: He is not in acute distress.     Appearance: He is well-developed. He is not diaphoretic.   Cardiovascular:      Pulses:           Popliteal pulses are 2+ on the right side and 2+ on the left side.        Dorsalis pedis pulses are 2+ on the right side and 2+ on the left side.        Posterior tibial pulses are 2+ on the right side and 2+ on the left side.      Comments: Capillary refill 3 seconds all toes/distal feet, all toes/both feet warm to touch.      Negative lymphadenopathy bilateral popliteal fossa and tarsal tunnel.      Negavie lower extremity edema bilateral.    Musculoskeletal:      Right ankle: No swelling, deformity, ecchymosis or lacerations. Normal range of motion. Normal pulse.      Right Achilles Tendon: Normal. No defects.  Alfred's test negative.   Lymphadenopathy:      Lower Body: No right inguinal adenopathy. No left inguinal adenopathy.      Comments: Negative lymphadenopathy bilateral popliteal fossa and tarsal tunnel.    Negative lymphangitic streaking bilateral feet/ankles/legs.   Skin:     General: Skin is warm and dry.      Capillary Refill: Capillary refill takes 2 to 3 seconds.      Coloration: Skin is not pale.      Findings: No abrasion, bruising, burn, ecchymosis, erythema, laceration, lesion or rash.      Nails: There is no clubbing.      Comments:   Visible and palpable ingrowth of toenail medial border left hallux with pain to palpation, and focal localized erythema and edema,  without ulceration, drainage, pus, tracking, fluctuance, malodor, or cardinal signs infection.       Neurological:      Mental Status: He is alert and oriented to person, place, and time.      Sensory: No sensory deficit.      Motor: No tremor, atrophy or abnormal muscle tone.      Gait: Gait normal.      Deep Tendon Reflexes:      Reflex Scores:       Patellar reflexes are 2+ on the right side and 2+ on the left side.       Achilles reflexes are 2+ on the right side and 2+ on the left side.  Psychiatric:         Behavior: Behavior is cooperative.             Assessment:       Encounter Diagnoses   Name Primary?    Toe pain, left     Ingrown nail Yes           Plan:       Santiago was seen today for ingrown toenail.    Diagnoses and all orders for this visit:    Ingrown nail  -     neomycin-polymyxin-hydrocortisone (CORTISPORIN) 3.5-10,000-1 mg/mL-unit/mL-% otic suspension; Place 2 drops into the right ear 2 (two) times daily. Apply to the affected toe, not the ear.    Toe pain, left  -     neomycin-polymyxin-hydrocortisone (CORTISPORIN) 3.5-10,000-1 mg/mL-unit/mL-% otic suspension; Place 2 drops into the right ear 2 (two) times daily. Apply to the affected toe, not the ear.        I counseled the patient on his conditions, their implications and  medical management.    Exacerbation of a chronic condition of the left great toe ingrown toenail.  Exhausted conservative treatment.  Patient would like to proceed with the procedure.  Will  scheduled for a chemical matricectomy procedure.  In the meantime, I would trimmed a wedge of the ingrown border.  And I placed a prescription topical.

## 2024-10-08 DIAGNOSIS — S46.011A TRAUMATIC COMPLETE TEAR OF RIGHT ROTATOR CUFF, INITIAL ENCOUNTER: Primary | ICD-10-CM

## 2024-10-08 DIAGNOSIS — M75.101 ROTATOR CUFF SYNDROME, RIGHT: ICD-10-CM

## 2024-10-08 RX ORDER — CELECOXIB 200 MG/1
200 CAPSULE ORAL 2 TIMES DAILY
Qty: 60 CAPSULE | Refills: 1 | Status: SHIPPED | OUTPATIENT
Start: 2024-10-08

## 2024-10-08 RX ORDER — METHOCARBAMOL 500 MG/1
500 TABLET, FILM COATED ORAL 3 TIMES DAILY
Qty: 60 TABLET | Refills: 0 | Status: SHIPPED | OUTPATIENT
Start: 2024-10-08 | End: 2024-10-28

## 2024-10-09 ENCOUNTER — CLINICAL SUPPORT (OUTPATIENT)
Dept: REHABILITATION | Facility: OTHER | Age: 64
End: 2024-10-09
Attending: ORTHOPAEDIC SURGERY
Payer: COMMERCIAL

## 2024-10-09 DIAGNOSIS — M25.511 ACUTE PAIN OF RIGHT SHOULDER: Primary | ICD-10-CM

## 2024-10-09 DIAGNOSIS — M75.101 NON-TRAUMATIC ROTATOR CUFF TEAR, RIGHT: ICD-10-CM

## 2024-10-09 PROCEDURE — 97162 PT EVAL MOD COMPLEX 30 MIN: CPT | Mod: PN | Performed by: PHYSICAL THERAPIST

## 2024-10-09 PROCEDURE — 97530 THERAPEUTIC ACTIVITIES: CPT | Mod: PN | Performed by: PHYSICAL THERAPIST
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
## 2024-10-14 PROBLEM — S46.011A TRAUMATIC COMPLETE TEAR OF RIGHT ROTATOR CUFF: Status: ACTIVE | Noted: 2024-10-14

## 2024-10-14 PROBLEM — F41.9 ANXIETY: Status: ACTIVE | Noted: 2024-10-14

## 2024-10-15 ENCOUNTER — OFFICE VISIT (OUTPATIENT)
Dept: INTERNAL MEDICINE | Facility: CLINIC | Age: 64
End: 2024-10-15
Payer: COMMERCIAL

## 2024-10-15 ENCOUNTER — LAB VISIT (OUTPATIENT)
Dept: LAB | Facility: HOSPITAL | Age: 64
End: 2024-10-15
Payer: COMMERCIAL

## 2024-10-15 VITALS
HEIGHT: 72 IN | DIASTOLIC BLOOD PRESSURE: 75 MMHG | OXYGEN SATURATION: 97 % | HEART RATE: 69 BPM | SYSTOLIC BLOOD PRESSURE: 137 MMHG | TEMPERATURE: 98 F | BODY MASS INDEX: 27.17 KG/M2 | WEIGHT: 200.63 LBS

## 2024-10-15 DIAGNOSIS — D53.9 MACROCYTIC ANEMIA: ICD-10-CM

## 2024-10-15 DIAGNOSIS — N40.1 BENIGN PROSTATIC HYPERPLASIA WITH NOCTURIA: ICD-10-CM

## 2024-10-15 DIAGNOSIS — F12.90 MARIJUANA USE: ICD-10-CM

## 2024-10-15 DIAGNOSIS — S46.011S TRAUMATIC COMPLETE TEAR OF RIGHT ROTATOR CUFF, SEQUELA: ICD-10-CM

## 2024-10-15 DIAGNOSIS — R35.1 BENIGN PROSTATIC HYPERPLASIA WITH NOCTURIA: ICD-10-CM

## 2024-10-15 DIAGNOSIS — F41.9 ANXIETY: ICD-10-CM

## 2024-10-15 DIAGNOSIS — R06.83 SNORING: ICD-10-CM

## 2024-10-15 DIAGNOSIS — Z78.9 ALCOHOL CONSUMPTION OF MORE THAN TWO DRINKS PER DAY: ICD-10-CM

## 2024-10-15 DIAGNOSIS — Z01.818 PREOPERATIVE EXAMINATION: Primary | ICD-10-CM

## 2024-10-15 DIAGNOSIS — Z01.818 PREOPERATIVE EXAMINATION: ICD-10-CM

## 2024-10-15 LAB
ALBUMIN SERPL BCP-MCNC: 4.4 G/DL (ref 3.5–5.2)
ALP SERPL-CCNC: 45 U/L (ref 55–135)
ALT SERPL W/O P-5'-P-CCNC: 45 U/L (ref 10–44)
ANION GAP SERPL CALC-SCNC: 9 MMOL/L (ref 8–16)
AST SERPL-CCNC: 28 U/L (ref 10–40)
BILIRUB SERPL-MCNC: 1 MG/DL (ref 0.1–1)
BUN SERPL-MCNC: 11 MG/DL (ref 8–23)
CALCIUM SERPL-MCNC: 10.1 MG/DL (ref 8.7–10.5)
CHLORIDE SERPL-SCNC: 104 MMOL/L (ref 95–110)
CO2 SERPL-SCNC: 22 MMOL/L (ref 23–29)
CREAT SERPL-MCNC: 0.9 MG/DL (ref 0.5–1.4)
ERYTHROCYTE [DISTWIDTH] IN BLOOD BY AUTOMATED COUNT: 11.7 % (ref 11.5–14.5)
EST. GFR  (NO RACE VARIABLE): >60 ML/MIN/1.73 M^2
GLUCOSE SERPL-MCNC: 100 MG/DL (ref 70–110)
HCT VFR BLD AUTO: 43.6 % (ref 40–54)
HGB BLD-MCNC: 15.1 G/DL (ref 14–18)
MCH RBC QN AUTO: 34.7 PG (ref 27–31)
MCHC RBC AUTO-ENTMCNC: 34.6 G/DL (ref 32–36)
MCV RBC AUTO: 100 FL (ref 82–98)
PLATELET # BLD AUTO: 255 K/UL (ref 150–450)
PMV BLD AUTO: 8.8 FL (ref 9.2–12.9)
POTASSIUM SERPL-SCNC: 4.9 MMOL/L (ref 3.5–5.1)
PROT SERPL-MCNC: 6.9 G/DL (ref 6–8.4)
RBC # BLD AUTO: 4.35 M/UL (ref 4.6–6.2)
SODIUM SERPL-SCNC: 135 MMOL/L (ref 136–145)
WBC # BLD AUTO: 7.73 K/UL (ref 3.9–12.7)

## 2024-10-15 PROCEDURE — 99215 OFFICE O/P EST HI 40 MIN: CPT | Mod: S$GLB,,, | Performed by: NURSE PRACTITIONER

## 2024-10-15 PROCEDURE — 1159F MED LIST DOCD IN RCRD: CPT | Mod: CPTII,S$GLB,, | Performed by: NURSE PRACTITIONER

## 2024-10-15 PROCEDURE — 80053 COMPREHEN METABOLIC PANEL: CPT | Performed by: NURSE PRACTITIONER

## 2024-10-15 PROCEDURE — 3078F DIAST BP <80 MM HG: CPT | Mod: CPTII,S$GLB,, | Performed by: NURSE PRACTITIONER

## 2024-10-15 PROCEDURE — 36415 COLL VENOUS BLD VENIPUNCTURE: CPT | Performed by: NURSE PRACTITIONER

## 2024-10-15 PROCEDURE — 1160F RVW MEDS BY RX/DR IN RCRD: CPT | Mod: CPTII,S$GLB,, | Performed by: NURSE PRACTITIONER

## 2024-10-15 PROCEDURE — 99999 PR PBB SHADOW E&M-EST. PATIENT-LVL IV: CPT | Mod: PBBFAC,,, | Performed by: NURSE PRACTITIONER

## 2024-10-15 PROCEDURE — 85027 COMPLETE CBC AUTOMATED: CPT | Performed by: NURSE PRACTITIONER

## 2024-10-15 PROCEDURE — 3075F SYST BP GE 130 - 139MM HG: CPT | Mod: CPTII,S$GLB,, | Performed by: NURSE PRACTITIONER

## 2024-10-15 PROCEDURE — 3008F BODY MASS INDEX DOCD: CPT | Mod: CPTII,S$GLB,, | Performed by: NURSE PRACTITIONER

## 2024-10-15 NOTE — HPI
This is a 64 y.o. male  who presents today for a preoperative evaluation in preparation for right rotator cuff repair.  Surgery is indicated for  traumatic complete tear of right rotator cuff .   Patient is new to me.  The history has been obtained by speaking with the patient and reviewing the electronic medical record and/or outside health information. Significant health conditions for the perioperative period are discussed below in assessment and plan.   Patient reports current health status to be Excellent.  Denies any new symptoms before surgery.

## 2024-10-15 NOTE — ASSESSMENT & PLAN NOTE
RBC's= 4.35- mild  Hgb is 15.1 and Hct is 43.6  Possibly related to alcohol use  No B12/folic acid levels available  Suggest follow-up with PCP

## 2024-10-15 NOTE — PROGRESS NOTES
[facility] [address]  Progress Note    Patient Name: Santiago De Luna  MRN: [id number]  Date of Evaluation- 10/15/2024  PCP- Francine Crawford MD    Future cases for Santiago De Luna [[id number]]       Case ID Status Date Time Talib Procedure Provider Location    [id number] [facility] 10/29/2024  5:00  REPAIR, ROTATOR CUFF Aura Schwarz MD [[id number]] [facility] OR            HPI:  This is a 64 y.o. male  who presents today for a preoperative evaluation in preparation for right rotator cuff repair.  Surgery is indicated for  traumatic complete tear of right rotator cuff .   Patient is new to me.  The history has been obtained by speaking with the patient and reviewing the electronic medical record and/or outside health information. Significant health conditions for the perioperative period are discussed below in assessment and plan.   Patient reports current health status to be Excellent.  Denies any new symptoms before surgery.       Subjective/ Objective:     Chief Complaint: Preoperative evaulation, perioperative medical management, and complication reduction plan.     Functional Capacity: walks 10-15 miles weekly without CP/SOB.       Anesthesia issues: None    Difficulty mouth opening: No    Steroid use in the last 12 months:  yes- to right shoulder    Dental Issues: None    Family anesthesia difficulty: None       Family Hx of Thrombosis: None    Past Medical History:   Diagnosis Date    Anxiety disorder, unspecified     Male erectile dysfunction, unspecified          Past Medical History Pertinent Negatives:   Diagnosis Date Noted    Asthma 10/15/2024    Basal cell carcinoma 10/31/2023    COPD (chronic obstructive pulmonary disease) 10/15/2024    Coronary artery disease 10/15/2024    Deep vein thrombosis 10/15/2024    Depression 10/15/2024    Diabetes mellitus, type 2 10/15/2024    Disorder of kidney and ureter 10/15/2024    GERD (gastroesophageal reflux disease) 10/15/2024    Hyperlipidemia 10/15/2024    Hypertension 10/15/2024     Melanoma 10/31/2023    Seizures 10/15/2024    Squamous cell carcinoma of skin 10/31/2023    Stroke 10/15/2024    Thyroid disease 10/15/2024         Past Surgical History:   Procedure Laterality Date    ANTERIOR CRUCIATE LIGAMENT REPAIR Bilateral     HERNIA REPAIR  09/2017    ROTATOR CUFF REPAIR Bilateral        Review of Systems   Constitutional:  Negative for chills, fatigue, fever and unexpected weight change.   HENT:  Positive for hearing loss. Negative for congestion, rhinorrhea, sore throat, tinnitus and trouble swallowing.    Eyes:  Negative for visual disturbance.   Respiratory:  Negative for cough, chest tightness, shortness of breath and wheezing.         STOP BANG risk factors:  Snoring  Male sex   Cardiovascular:  Negative for chest pain, palpitations and leg swelling.   Gastrointestinal:  Positive for anal bleeding (mild x 1 episode- 1 week ago. will watch. Denies hemorrhoids). Negative for constipation and diarrhea.        Denies Fatty liver, Hepatitis   Genitourinary:  Positive for urgency. Negative for decreased urine volume, difficulty urinating, dysuria, frequency and hematuria.        Nocturia   Musculoskeletal:  Negative for arthralgias, back pain, neck pain and neck stiffness.   Skin:  Negative for rash and wound.   Neurological:  Positive for dizziness (occasional vertigo; meclizine prn). Negative for syncope, weakness, numbness and headaches.   Hematological:  Bruises/bleeds easily.   Psychiatric/Behavioral:  Negative for sleep disturbance and suicidal ideas.               VITALS  Visit Vitals  /75 (BP Location: Left arm, Patient Position: Sitting)   Pulse 69   Temp 98.2 °F (36.8 °C) (Oral)   Ht 6' (1.829 m)   Wt 91 kg (200 lb 9.9 oz)   SpO2 97%   BMI 27.21 kg/m²          Physical Exam  Vitals reviewed.   Constitutional:       General: He is not in acute distress.     Appearance: He is well-developed.   HENT:      Head: Normocephalic.      Nose: Nose normal.      Mouth/Throat:       Pharynx: No oropharyngeal exudate.   Eyes:      General:         Right eye: No discharge.         Left eye: No discharge.      Conjunctiva/sclera: Conjunctivae normal.      Pupils: Pupils are equal, round, and reactive to light.   Neck:      Thyroid: No thyromegaly.      Vascular: No carotid bruit or JVD.      Trachea: No tracheal deviation.   Cardiovascular:      Rate and Rhythm: Normal rate and regular rhythm.      Pulses:           Carotid pulses are 2+ on the right side and 2+ on the left side.       Dorsalis pedis pulses are 2+ on the right side and 2+ on the left side.        Posterior tibial pulses are 2+ on the right side and 2+ on the left side.      Heart sounds: Normal heart sounds. No murmur heard.  Pulmonary:      Effort: Pulmonary effort is normal. No respiratory distress.      Breath sounds: Normal breath sounds. No stridor. No wheezing, rhonchi or rales.   Abdominal:      General: Bowel sounds are normal. There is no distension.      Palpations: Abdomen is soft.      Tenderness: There is no abdominal tenderness. There is no guarding.   Musculoskeletal:      Cervical back: Normal range of motion. No pain with movement.      Right lower leg: Edema (trace) present.      Left lower leg: Edema (trace) present.   Lymphadenopathy:      Cervical: No cervical adenopathy.   Skin:     General: Skin is warm and dry.      Capillary Refill: Capillary refill takes less than 2 seconds.      Findings: No erythema or rash.   Neurological:      Mental Status: He is alert and oriented to person, place, and time.   Psychiatric:         Behavior: Behavior normal. Behavior is cooperative.          Significant Labs:  Lab Results   Component Value Date    WBC 7.73 10/15/2024    HGB 15.1 10/15/2024    HCT 43.6 10/15/2024     10/15/2024    CHOL 233 (H) 03/29/2023    TRIG 110 03/29/2023    HDL 70 03/29/2023    ALT 45 (H) 10/15/2024    AST 28 10/15/2024     (L) 10/15/2024    K 4.9 10/15/2024     10/15/2024     CREATININE 0.9 10/15/2024    BUN 11 10/15/2024    CO2 22 (L) 10/15/2024    TSH 1.577 03/29/2023    PSA 2.0 03/29/2023    INR 0.9 12/09/2021    HGBA1C 5.1 03/29/2023           EKG:  old 12/2021  VENTRICULAR RATE BPM 51   ATRIAL RATE BPM 51   P-R INTERVAL ms 160   QRS DURATION ms 92   Q-T INTERVAL ms 450   QTC CALCULATION(BEZET) ms 414   P AXIS degrees 58   R AXIS degrees 59   T AXIS degrees 60   INTERPRETATION (MUSE)  Sinus bradycardia           Active Cardiac Conditions: None      Revised Cardiac Risk Index   High -Risk Surgery  Intraperitoneal; Intrathoracic; suprainguinal vascular Yes- + 1 No- 0   History of Ischemic Heart Disease   (Hx of MI/positive exercise test/current chest pain due to ischemia/use of nitrate therapy/EKG with pathological Q waves) Yes- + 1 No- 0   History of CHF  (Pulmonary edema/bilateral rales or S3 gallop/PND/CXR showing pulmonary vascular redistribution) Yes- + 1 No- 0   History of CVA   (Prior stroke or TIA) Yes- + 1 No- 0   Pre-operative treatment with insulin Yes- + 1 No- 0   Pre-operative creatinine > 2mg/dl Yes- + 1 No- 0   Total: 0      Risk Status:  Estimated risk of cardiac complications after non-cardiac surgery using the Revised Cardiac Risk Index for Preoperative risk is 3.9 %      ARISCAT (Canet) risk index: Low: 1.6% risk of post-op pulmonary complications; Intermediate: 13.3% risk of post-op pulmonary complications if surgery is > 3 hours.     American Society of Anesthesiologists Physical Status classification (ASA): 2                   Orders Placed This Encounter    Comprehensive Metabolic Panel    CBC Without Differential           Assessment/Plan:     Traumatic complete tear of right rotator cuff  Scheduled with Dr. Schwarz on 10/29/24 for right rotator cuff repair.    Anxiety  Treated with:  Buspar  prn  ; controlled symptoms.   Denies suicidal/ homicidal ideations.   Managed per PCP    Benign prostatic hyperplasia with nocturia  Treated with: Flomax  Followed by:  "Urology; stable.   LUTS: Nocturia    There is an increased risk of post-op urinary retention.    Snoring  Denies GABRIELA. Possible sleep apnea: recommend caution with sedating medication in the perioperative period.   Sleep Study referral declined     Marijuana use  Informed patient it is best to avoid consumption of cannabis at least 12-72 hours before surgery to prevent airway and cardiovascular complications.  How much: "a few grams" daily    Alcohol consumption of more than two drinks per day  Drinks 2 beers and 2 mixed drinks daily  Treated with vitamins:  Audit-C score: 6  ALT is mildly elevated= 45   At increased risk for alcohol withdrawal during the perioperative period.       Macrocytic anemia  RBC's= 4.35- mild  Hgb is 15.1 and Hct is 43.6  Possibly related to alcohol use  No B12/folic acid levels available  Suggest follow-up with PCP        Discussion/Management of Perioperative Care    Thromboembolic prophylaxis (VTE) Care: Risk factors for thrombosis include: surgical procedure.  I recommend prophylaxis of thromboembolism with the use of compression stockings/pneumatic devices, and/or pharmacologic agents. The benefits should outweigh the risks for pharmacologic prophylaxis in the perioperative period. I also encourage early ambulation if not contraindicated during the post-operative period.    To reduce the risk of pulmonary complications, prophylactic recommendations include: incentive spirometry use/deep breathing, end tidal carbon dioxide monitoring, and pain control.    To reduce the risk of postoperative renal complications, I recommend the patient maintain adequate hydration.  Avoid/reduce NSAIDS and WOODY-2 inhibitors use as well as IV contrast for renal protection.    I recommend the use of appropriate prophylactic antibiotics to reduce the risk of surgical site infections.      The patient is at an increased risk for urinary retention due to : BPH/LUTS. I recommend to avoid/decrease the use of " benzodiazepines, anticholinergics, and Benadryl in the perioperative period. I also recommend using opioids for the shortest period of time if possible.          This visit was focused on Preoperative evaluation, Perioperative Medical management, complication reduction plans. I suggest that the patient follows up with primary care or relevant sub specialists for ongoing health care.    I appreciate the opportunity to be involved in this patients care. Please feel free to contact me if there were any questions about this consultation.      I spent a total of 40 minutes on the day of the visit.  This includes face to face time and non-face to face time preparing to see the patient (e.g., review of tests), obtaining and/or reviewing separately obtained history, documenting clinical information in the electronic or other health record, independently interpreting results and communicating results to the patient/family/caregiver, or care coordinator.         Patient is ready for surgery.  Labs are acceptable for procedure.     Theodore Martinez NP  Perioperative Medicine  [facility]

## 2024-10-15 NOTE — DISCHARGE INSTRUCTIONS
Your surgery has been scheduled for:______10/29/24____________________________________    You should report to:  ____[facility], located on the [place] side of the first floor of the           [facility] ([phone])  ____[facility], located on the [facility] side of the            Second floor of the [facility] ([phone])  ____3rd Floor [facility] located on the [facility] side of the [facility] [phone]  __X__[facility]: located at [address] [place] [address]. [address]     Please Note   Tell your doctor if you take Aspirin, products containing Aspirin, herbal medications  or blood thinners, such as Coumadin, Ticlid, or Plavix.  (Consult your provider regarding holding or stopping before surgery).  Arrange for someone to drive you home following surgery.  You will not be allowed to leave the surgical facility alone or drive yourself home following sedation and anesthesia.    Before Surgery  Stop taking all herbal medications, vitamins, and supplements 7 days prior to surgery  No Motrin/Advil (Ibuprofen) 7 days before surgery  No Aleve (Naproxen) 7 days before surgery   No Goody's/BC Powder 7 days before surgery  Refrain from drinking alcoholic beverages for 24 hours before and after surgery  Stop or limit smoking at least 24 hours prior to surgery  You may take Tylenol for pain    Night before Surgery  Do not eat or drink after midnight  Take a shower or bath (shower is recommended).  Bathe with Hibiclens soap or an antibacterial soap from the neck down.  If not supplied by your surgeon, hibiclens soap will need to be purchased over the counter in pharmacy.  Rinse soap off thoroughly.  Shampoo your hair with your regular shampoo    The Day of Surgery  Take another bath or shower with hibiclens or any antibacterial soap, to reduce the chance of infection.  Take heart  and blood pressure medications with a small sip of water, as advised by the perioperative team.  Do not take fluid pills  You may brush your teeth and rinse your mouth, but do not swallow any additional water.   Do not apply perfumes, powder, body lotions or deodorant on the day of surgery.  Nail polish should be removed.  Do not wear makeup or moisturizer  Wear comfortable clothes, such as a button front shirt and loose fitting pants.  Leave all jewelry, including body piercings, and valuables at home.    Bring any devices you will need after surgery such as crutches or canes.  If you have sleep apnea, please bring your CPAP machine  In the event that your physical condition changes including the onset of a cold or respiratory illness, or if you have to delay or cancel your surgery, please notify your surgeon.

## 2024-10-15 NOTE — ASSESSMENT & PLAN NOTE
Treated with:  Buspar  prn  ; controlled symptoms.   Denies suicidal/ homicidal ideations.   Managed per PCP

## 2024-10-15 NOTE — ASSESSMENT & PLAN NOTE
Denies GABRIELA. Possible sleep apnea: recommend caution with sedating medication in the perioperative period.   Sleep Study referral declined

## 2024-10-15 NOTE — ASSESSMENT & PLAN NOTE
Drinks 2 beers and 2 mixed drinks daily  Treated with vitamins:  Audit-C score: 6  ALT is mildly elevated= 45   At increased risk for alcohol withdrawal during the perioperative period.

## 2024-10-15 NOTE — ASSESSMENT & PLAN NOTE
Treated with: Flomax  Followed by: Urology; stable.   LUTS: Nocturia    There is an increased risk of post-op urinary retention.

## 2024-10-15 NOTE — OUTPATIENT SUBJECTIVE & OBJECTIVE
Outpatient Subjective & Objective      Chief Complaint: Preoperative evaulation, perioperative medical management, and complication reduction plan.     Functional Capacity: walks 10-15 miles weekly without CP/SOB.       Anesthesia issues: None    Difficulty mouth opening: No    Steroid use in the last 12 months:  yes- to right shoulder    Dental Issues: None    Family anesthesia difficulty: None       Family Hx of Thrombosis: None    Past Medical History:   Diagnosis Date    Anxiety disorder, unspecified     Male erectile dysfunction, unspecified          Past Medical History Pertinent Negatives:   Diagnosis Date Noted    Asthma 10/15/2024    Basal cell carcinoma 10/31/2023    COPD (chronic obstructive pulmonary disease) 10/15/2024    Coronary artery disease 10/15/2024    Deep vein thrombosis 10/15/2024    Depression 10/15/2024    Diabetes mellitus, type 2 10/15/2024    Disorder of kidney and ureter 10/15/2024    GERD (gastroesophageal reflux disease) 10/15/2024    Hyperlipidemia 10/15/2024    Hypertension 10/15/2024    Melanoma 10/31/2023    Seizures 10/15/2024    Squamous cell carcinoma of skin 10/31/2023    Stroke 10/15/2024    Thyroid disease 10/15/2024         Past Surgical History:   Procedure Laterality Date    ANTERIOR CRUCIATE LIGAMENT REPAIR Bilateral     HERNIA REPAIR  09/2017    ROTATOR CUFF REPAIR Bilateral        Review of Systems   Constitutional:  Negative for chills, fatigue, fever and unexpected weight change.   HENT:  Positive for hearing loss. Negative for congestion, rhinorrhea, sore throat, tinnitus and trouble swallowing.    Eyes:  Negative for visual disturbance.   Respiratory:  Negative for cough, chest tightness, shortness of breath and wheezing.         STOP BANG risk factors:  Snoring  Male sex   Cardiovascular:  Negative for chest pain, palpitations and leg swelling.   Gastrointestinal:  Positive for anal bleeding (mild x 1 episode- 1 week ago. will watch. Denies hemorrhoids). Negative  for constipation and diarrhea.        Denies Fatty liver, Hepatitis   Genitourinary:  Positive for urgency. Negative for decreased urine volume, difficulty urinating, dysuria, frequency and hematuria.        Nocturia   Musculoskeletal:  Negative for arthralgias, back pain, neck pain and neck stiffness.   Skin:  Negative for rash and wound.   Neurological:  Positive for dizziness (occasional vertigo; meclizine prn). Negative for syncope, weakness, numbness and headaches.   Hematological:  Bruises/bleeds easily.   Psychiatric/Behavioral:  Negative for sleep disturbance and suicidal ideas.               VITALS  Visit Vitals  /75 (BP Location: Left arm, Patient Position: Sitting)   Pulse 69   Temp 98.2 °F (36.8 °C) (Oral)   Ht 6' (1.829 m)   Wt 91 kg (200 lb 9.9 oz)   SpO2 97%   BMI 27.21 kg/m²          Physical Exam  Vitals reviewed.   Constitutional:       General: He is not in acute distress.     Appearance: He is well-developed.   HENT:      Head: Normocephalic.      Nose: Nose normal.      Mouth/Throat:      Pharynx: No oropharyngeal exudate.   Eyes:      General:         Right eye: No discharge.         Left eye: No discharge.      Conjunctiva/sclera: Conjunctivae normal.      Pupils: Pupils are equal, round, and reactive to light.   Neck:      Thyroid: No thyromegaly.      Vascular: No carotid bruit or JVD.      Trachea: No tracheal deviation.   Cardiovascular:      Rate and Rhythm: Normal rate and regular rhythm.      Pulses:           Carotid pulses are 2+ on the right side and 2+ on the left side.       Dorsalis pedis pulses are 2+ on the right side and 2+ on the left side.        Posterior tibial pulses are 2+ on the right side and 2+ on the left side.      Heart sounds: Normal heart sounds. No murmur heard.  Pulmonary:      Effort: Pulmonary effort is normal. No respiratory distress.      Breath sounds: Normal breath sounds. No stridor. No wheezing, rhonchi or rales.   Abdominal:      General: Bowel  sounds are normal. There is no distension.      Palpations: Abdomen is soft.      Tenderness: There is no abdominal tenderness. There is no guarding.   Musculoskeletal:      Cervical back: Normal range of motion. No pain with movement.      Right lower leg: Edema (trace) present.      Left lower leg: Edema (trace) present.   Lymphadenopathy:      Cervical: No cervical adenopathy.   Skin:     General: Skin is warm and dry.      Capillary Refill: Capillary refill takes less than 2 seconds.      Findings: No erythema or rash.   Neurological:      Mental Status: He is alert and oriented to person, place, and time.   Psychiatric:         Behavior: Behavior normal. Behavior is cooperative.          Significant Labs:  Lab Results   Component Value Date    WBC 7.73 10/15/2024    HGB 15.1 10/15/2024    HCT 43.6 10/15/2024     10/15/2024    CHOL 233 (H) 03/29/2023    TRIG 110 03/29/2023    HDL 70 03/29/2023    ALT 45 (H) 10/15/2024    AST 28 10/15/2024     (L) 10/15/2024    K 4.9 10/15/2024     10/15/2024    CREATININE 0.9 10/15/2024    BUN 11 10/15/2024    CO2 22 (L) 10/15/2024    TSH 1.577 03/29/2023    PSA 2.0 03/29/2023    INR 0.9 12/09/2021    HGBA1C 5.1 03/29/2023           EKG:  old 12/2021  VENTRICULAR RATE BPM 51   ATRIAL RATE BPM 51   P-R INTERVAL ms 160   QRS DURATION ms 92   Q-T INTERVAL ms 450   QTC CALCULATION(BEZET) ms 414   P AXIS degrees 58   R AXIS degrees 59   T AXIS degrees 60   INTERPRETATION (MUSE)  Sinus bradycardia           Active Cardiac Conditions: None      Revised Cardiac Risk Index   High -Risk Surgery  Intraperitoneal; Intrathoracic; suprainguinal vascular Yes- + 1 No- 0   History of Ischemic Heart Disease   (Hx of MI/positive exercise test/current chest pain due to ischemia/use of nitrate therapy/EKG with pathological Q waves) Yes- + 1 No- 0   History of CHF  (Pulmonary edema/bilateral rales or S3 gallop/PND/CXR showing pulmonary vascular redistribution) Yes- + 1 No- 0    History of CVA   (Prior stroke or TIA) Yes- + 1 No- 0   Pre-operative treatment with insulin Yes- + 1 No- 0   Pre-operative creatinine > 2mg/dl Yes- + 1 No- 0   Total: 0      Risk Status:  Estimated risk of cardiac complications after non-cardiac surgery using the Revised Cardiac Risk Index for Preoperative risk is 3.9 %      ARISCAT (Canet) risk index: Low: 1.6% risk of post-op pulmonary complications; Intermediate: 13.3% risk of post-op pulmonary complications if surgery is > 3 hours.     American Society of Anesthesiologists Physical Status classification (ASA): 2             Outpatient Subjective & Objective

## 2024-10-17 ENCOUNTER — PATIENT MESSAGE (OUTPATIENT)
Dept: PREADMISSION TESTING | Facility: HOSPITAL | Age: 64
End: 2024-10-17
Payer: COMMERCIAL

## 2024-10-17 ENCOUNTER — TELEPHONE (OUTPATIENT)
Dept: SPORTS MEDICINE | Facility: CLINIC | Age: 64
End: 2024-10-17
Payer: COMMERCIAL

## 2024-10-17 NOTE — ANESTHESIA PAT ROS NOTE
10/17/2024  Santiago De Luna is a 64 y.o., male.      Pre-op Assessment    I have reviewed the Patient Summary Reports.       I have reviewed the Medications.     Review of Systems  Anesthesia Hx:  No problems with previous Anesthesia   History of prior surgery of interest to airway management or planning:  Previous anesthesia: General, Nerve Block Inguinal Hernia 9/18/17 with general anesthesia.      for Interscalene Cath 8/22/16.   Airway issues documented on chart review include laryngeal mask airway used        Social:  Former Smoker, Alcohol Use, Recreational Drugs Marijuana      Hematology/Oncology:    Oncology Normal    -- Anemia:               Hematology Comments: Macrocytic anemia                    EENT/Dental:  EENT/Dental Normal           Cardiovascular:  Cardiovascular Normal Exercise tolerance: good                   Patient not on beta blockers                          Pulmonary:  Pulmonary Normal                       Renal/:    BPH              Musculoskeletal:     Traumatic complete tear of right rotator cuff, initial encounter    Rotator cuff syndrome, right    H/o rotator cuff repair, ACL repair            Psych:  Psychiatric History anxiety                  Past Medical History:   Diagnosis Date    Anxiety disorder, unspecified     Male erectile dysfunction, unspecified      Past Surgical History:   Procedure Laterality Date    ANTERIOR CRUCIATE LIGAMENT REPAIR Bilateral     HERNIA REPAIR  09/2017    ROTATOR CUFF REPAIR Bilateral          Anesthesia Assessment: Preoperative EQUATION    Planned Procedure: Procedure(s) (LRB):  REPAIR, ROTATOR CUFF (Right)  ARTHROSCOPY,SHOULDER,WITH BICEPS TENODESIS (Right)  DEBRIDEMENT, SHOULDER, ARTHROSCOPIC (Right)  ARTHROSCOPY, SHOULDER, WITH DISTAL CLAVICLE EXCISION (Right)  Requested Anesthesia Type:General  Surgeon: Aura Schwarz MD  Service:  "Orthopedics  Known or anticipated Date of Surgery:10/29/2024    Surgeon notes: reviewed    Electronic QUestionnaire Assessment completed via nurse interview with patient.        Triage considerations:     The patient has no apparent active cardiac condition (No unstable coronary Syndrome such as severe unstable angina or recent [<1 month] myocardial infarction, decompensated CHF, severe valvular   disease or significant arrhythmia)    Previous anesthesia records:LMA General, Nerve block for post-op pain, and No problems    Last PCP note: within 1 month   Subspecialty notes: Ortho, Urology    Other important co-morbidities:  former smoker       Tests already available:  Results have been reviewed.             Instructions given. (See in Nurse's note)    Optimization:  Anesthesia Preop Clinic Assessment Indicated    Medical Opinion Indicated           Plan:    Consultation: Pre op center visit 10/15/24 , "Patient is ready for surgery"   Patient  has previously scheduled Medical Appointment    Navigation: Pt ready for surgery. Okay to proceed at [facility].    Ht: 6'  Wt: 200  BMI: 27.21  "

## 2024-10-17 NOTE — TELEPHONE ENCOUNTER
LVM for patient to call back to discuss.     ----- Message from Melita Del Real sent at 10/17/2024  2:08 PM CDT -----  Regarding: FW: PT'S RETURNING A CALL REGARDING SURGERY  Contact: PT    ----- Message -----  From: Candi Hunt  Sent: 10/17/2024   1:21 PM CDT  To: [facility] Staff  Subject: PT'S RETURNING A CALL REGARDING SURGERY          Confirmed contact info below:  Contact Name: Santiago De Luna  Phone Number: [phone]

## 2024-10-17 NOTE — TELEPHONE ENCOUNTER
LVM For patient to give office a call back to discuss his upcoming surgery.     Nasima   Clinical & Surgical Assistant to Dr. Aura Schwarz

## 2024-10-18 ENCOUNTER — TELEPHONE (OUTPATIENT)
Dept: SPORTS MEDICINE | Facility: CLINIC | Age: 64
End: 2024-10-18
Payer: COMMERCIAL

## 2024-10-18 NOTE — TELEPHONE ENCOUNTER
Spoke with patient and discussed IV saline/solution shortage due to [place]. Patient will not attend preop on 10/21 as he will be unable to have surgery on 10/29. Canceled these appointments. Patient noted he does have an international trip planned for end of Dec-3 weeks long. Depending on when the IV saline/solution is restored depends on when he would like to have surgery - he is thinking in January after his trip. Agreed with patient and we will keep in touch. He is already in physical therapy at [facility] with Felicita and will continue doing so. Patient noted understanding and agreement     Nasima   Clinical & Surgical Assistant to Dr. Aura Schwarz

## 2024-10-22 ENCOUNTER — PATIENT MESSAGE (OUTPATIENT)
Dept: REHABILITATION | Facility: OTHER | Age: 64
End: 2024-10-22
Payer: COMMERCIAL

## 2024-10-23 ENCOUNTER — PATIENT MESSAGE (OUTPATIENT)
Dept: RESEARCH | Facility: HOSPITAL | Age: 64
End: 2024-10-23
Payer: COMMERCIAL

## 2024-10-23 ENCOUNTER — HOSPITAL ENCOUNTER (OUTPATIENT)
Dept: RADIOLOGY | Facility: OTHER | Age: 64
Discharge: HOME | End: 2024-10-23
Attending: OTOLARYNGOLOGY
Payer: COMMERCIAL

## 2024-10-23 DIAGNOSIS — R42 DIZZINESS: ICD-10-CM

## 2024-10-23 PROCEDURE — 25500020 PHARM REV CODE 255: Performed by: OTOLARYNGOLOGY

## 2024-10-23 PROCEDURE — 70482 CT ORBIT/EAR/FOSSA W/O&W/DYE: CPT | Mod: TC

## 2024-10-23 PROCEDURE — 70470 CT HEAD/BRAIN W/O & W/DYE: CPT | Mod: TC

## 2024-10-23 RX ADMIN — IOHEXOL 75 ML: 350 INJECTION, SOLUTION INTRAVENOUS at 12:10

## 2024-10-31 ENCOUNTER — CLINICAL SUPPORT (OUTPATIENT)
Dept: REHABILITATION | Facility: OTHER | Age: 64
End: 2024-10-31
Attending: ORTHOPAEDIC SURGERY
Payer: COMMERCIAL

## 2024-10-31 DIAGNOSIS — M25.511 ACUTE PAIN OF RIGHT SHOULDER: Primary | ICD-10-CM

## 2024-10-31 PROCEDURE — 97530 THERAPEUTIC ACTIVITIES: CPT | Mod: PN | Performed by: PHYSICAL THERAPIST

## 2024-11-01 ENCOUNTER — TELEPHONE (OUTPATIENT)
Dept: SPORTS MEDICINE | Facility: CLINIC | Age: 64
End: 2024-11-01
Payer: COMMERCIAL

## 2024-11-06 ENCOUNTER — OFFICE VISIT (OUTPATIENT)
Dept: SPORTS MEDICINE | Facility: CLINIC | Age: 64
End: 2024-11-06
Payer: COMMERCIAL

## 2024-11-06 VITALS
DIASTOLIC BLOOD PRESSURE: 81 MMHG | HEART RATE: 66 BPM | BODY MASS INDEX: 27.31 KG/M2 | HEIGHT: 72 IN | WEIGHT: 201.63 LBS | SYSTOLIC BLOOD PRESSURE: 132 MMHG

## 2024-11-06 DIAGNOSIS — M25.811 IMPINGEMENT OF RIGHT SHOULDER: ICD-10-CM

## 2024-11-06 DIAGNOSIS — S46.011A TRAUMATIC COMPLETE TEAR OF RIGHT ROTATOR CUFF, INITIAL ENCOUNTER: ICD-10-CM

## 2024-11-06 DIAGNOSIS — M75.101 ROTATOR CUFF SYNDROME, RIGHT: Primary | ICD-10-CM

## 2024-11-06 PROCEDURE — 99999 PR PBB SHADOW E&M-EST. PATIENT-LVL III: CPT | Mod: PBBFAC,,, | Performed by: PHYSICIAN ASSISTANT

## 2024-11-06 RX ORDER — ASPIRIN 325 MG
TABLET ORAL
Qty: 42 TABLET | Refills: 0 | Status: SHIPPED | OUTPATIENT
Start: 2024-11-06

## 2024-11-06 RX ORDER — ROPIVACAINE/EPI/CLONIDINE/KET 2.46-0.005
SYRINGE (ML) INJECTION ONCE
OUTPATIENT
Start: 2024-11-06 | End: 2024-11-06

## 2024-11-06 RX ORDER — PREGABALIN 75 MG/1
75 CAPSULE ORAL 2 TIMES DAILY
Qty: 60 CAPSULE | Refills: 0 | Status: SHIPPED | OUTPATIENT
Start: 2024-11-06

## 2024-11-06 RX ORDER — METHOCARBAMOL 500 MG/1
500 TABLET, FILM COATED ORAL 3 TIMES DAILY PRN
Qty: 40 TABLET | Refills: 0 | Status: SHIPPED | OUTPATIENT
Start: 2024-11-06

## 2024-11-06 RX ORDER — OXYCODONE HYDROCHLORIDE 5 MG/1
TABLET ORAL
Qty: 28 TABLET | Refills: 0 | Status: SHIPPED | OUTPATIENT
Start: 2024-11-06

## 2024-11-06 RX ORDER — SODIUM CHLORIDE 0.9 % (FLUSH) 0.9 %
10 SYRINGE (ML) INJECTION CONTINUOUS
OUTPATIENT
Start: 2024-11-06

## 2024-11-06 RX ORDER — CELECOXIB 200 MG/1
200 CAPSULE ORAL 2 TIMES DAILY WITH MEALS
Qty: 60 CAPSULE | Refills: 0 | Status: SHIPPED | OUTPATIENT
Start: 2024-11-06

## 2024-11-06 RX ORDER — MUPIROCIN 20 MG/G
OINTMENT TOPICAL
OUTPATIENT
Start: 2024-11-06

## 2024-11-06 RX ORDER — PROMETHAZINE HYDROCHLORIDE 25 MG/1
25 TABLET ORAL EVERY 4 HOURS PRN
Qty: 30 TABLET | Refills: 0 | Status: SHIPPED | OUTPATIENT
Start: 2024-11-06

## 2024-11-06 NOTE — H&P (VIEW-ONLY)
"Santiago De Luna  is here for a completion of his perioperative paperwork. he  Is scheduled to undergo Right shoulder  70366 Arthroscopy, decompression of subacromial space with partial acriomioplasty with or without coracoacromial release with ROTATOR CUFF REPAIR (BioBrace augmentation)  22550 Biceps tenodesis  31450 Arthroscopy, shoulder, debridement, extensive  26382 Arthroscopy, distal claviculectomy including distal articular surface (A-C resection)    on 11/122024.      He is a healthy individual and does need clearance for this procedure.   Per preop center, "Patient is ready for surgery.  Labs are acceptable for procedure."    Risks, indications and benefits of the surgical procedure were discussed with the patient. All questions with regard to surgery, rehab, expected return to functional activities, activities of daily living and recreational endeavors were answered to his satisfaction.    Discussed COVID-19 with the patient, they are aware of our current policies and procedures, were given the option of delaying surgery, and they elect to proceed.    Patient was informed and understands the risks of surgery are greater for patients with a current condition or history of heart disease, obesity, clotting disorders, recurrent infections, steroid use, current or past smoking, and factors such as sedentary lifestyle and noncompliance with medications, therapy or follow-up. The degree of the increased risk is hard to estimate with any degree of precision.    Once no other questions were asked, a brief history and physical exam was then performed.    PAST MEDICAL HISTORY:   Past Medical History:   Diagnosis Date    Anxiety disorder, unspecified     Male erectile dysfunction, unspecified      PAST SURGICAL HISTORY:   Past Surgical History:   Procedure Laterality Date    ANTERIOR CRUCIATE LIGAMENT REPAIR Bilateral     HERNIA REPAIR  09/2017    ROTATOR CUFF REPAIR Bilateral      FAMILY HISTORY:   Family History "   Problem Relation Name Age of Onset    Alcohol abuse Mother Debora Heart     Early death Mother Debora Heart     Aortic aneurysm Mother Debora Heart     Stroke Father Mauricio De Luna     Early death Father Mauricio De Luna     Hearing loss Father Mauricio De Luna     Heart disease Father Mauricio De Luna     Prostate cancer Father Mauricio De Luna     Dementia Father Mauricio De Luna     Obesity Sister       SOCIAL HISTORY:   Social History     Socioeconomic History    Marital status:    Tobacco Use    Smoking status: Never    Smokeless tobacco: Never   Substance and Sexual Activity    Alcohol use: Yes     Alcohol/week: 28.0 standard drinks of alcohol     Types: 14 Cans of beer, 14 Shots of liquor per week    Drug use: Yes     Frequency: 5.0 times per week     Types: Marijuana    Sexual activity: Yes     Partners: Female     Birth control/protection: None     Social Drivers of Health     Financial Resource Strain: Low Risk  (8/13/2024)    Overall Financial Resource Strain (CARDIA)     Difficulty of Paying Living Expenses: Not hard at all   Food Insecurity: No Food Insecurity (8/13/2024)    Hunger Vital Sign     Worried About Running Out of Food in the Last Year: Never true     Ran Out of Food in the Last Year: Never true   Physical Activity: Sufficiently Active (8/13/2024)    Exercise Vital Sign     Days of Exercise per Week: 5 days     Minutes of Exercise per Session: 40 min   Stress: No Stress Concern Present (8/13/2024)    [employer] - Occupational Stress Questionnaire     Feeling of Stress : Not at all   Housing Stability: Unknown (8/13/2024)    Housing Stability Vital Sign     Unable to Pay for Housing in the Last Year: No       MEDICATIONS:   Current Outpatient Medications:     meloxicam (MOBIC) 15 MG tablet, Take 1 tablet (15 mg total) by mouth once daily., Disp: 30 tablet, Rfl: 2    busPIRone (BUSPAR) 5 MG Tab, TAKE 1 TABLET(5 MG) BY MOUTH THREE TIMES DAILY AS NEEDED  FOR ANXIETY (Patient not taking: Reported on 11/6/2024), Disp: 90 tablet, Rfl: 3    celecoxib (CELEBREX) 200 MG capsule, Take 1 capsule (200 mg total) by mouth 2 (two) times daily. (Patient not taking: Reported on 11/6/2024), Disp: 60 capsule, Rfl: 1    meclizine (ANTIVERT) 25 mg tablet, Take 1 tablet (25 mg total) by mouth 3 (three) times daily as needed for Dizziness. (Patient not taking: Reported on 11/6/2024), Disp: 90 tablet, Rfl: 1    tadalafiL (CIALIS) 5 MG tablet, Take 1 tablet (5 mg total) by mouth daily as needed for Erectile Dysfunction. (Patient not taking: Reported on 11/6/2024), Disp: 20 tablet, Rfl: 11    tamsulosin (FLOMAX) 0.4 mg Cap, Take 1 capsule (0.4 mg total) by mouth once daily., Disp: 30 capsule, Rfl: 11  ALLERGIES:   Review of patient's allergies indicates:   Allergen Reactions    Indomethacin      Diarrhea and gas    Shellfish derived Itching, Rash and Swelling     Mussels,clams,shrimps- rash  Swelling of face      Hydrocodone Hives, Itching and Rash    Hydrocodone-acetaminophen Itching and Rash    Mupirocin Diarrhea, Nausea And Vomiting and Other (See Comments)       Review of Systems   Constitution: Negative. Negative for chills, fever and night sweats.   HENT: Negative for congestion and headaches.    Eyes: Negative for blurred vision, left vision loss and right vision loss.   Cardiovascular: Negative for chest pain and syncope.   Respiratory: Negative for cough and shortness of breath.    Endocrine: Negative for polydipsia, polyphagia and polyuria.   Hematologic/Lymphatic: Negative for bleeding problem. Does not bruise/bleed easily.   Skin: Negative for dry skin, itching and rash.   Musculoskeletal: Negative for falls and muscle weakness.   Gastrointestinal: Negative for abdominal pain and bowel incontinence.   Genitourinary: Negative for bladder incontinence and nocturia.   Neurological: Negative for disturbances in coordination, loss of balance and seizures.   Psychiatric/Behavioral:  Negative for depression. The patient does not have insomnia.    Allergic/Immunologic: Negative for hives and persistent infections.     PHYSICAL EXAM:  GEN: A&Ox3, WD WN NAD  HEENT: WNL  CHEST: CTAB, no W/R/R  HEART: RRR, no M/R/G   ABD: Soft, NT ND, BS x4 QUADS  MS: Refer to previous note for detailed MS exam  NEURO: CN II-XII intact       The surgical consent was then reviewed with the patient, who agreed with all the contents of the consent form and it was signed. he was instructed to wait for a phone call from the anesthesia department prior to surgery to discuss past medical history, medications, and clearance. Also, informed he may be required to get additional testing per the anesthesia department prior to having surgery.     PHYSICAL THERAPY:  He was also instructed regarding physical therapy and will begin POD # 1-3. He is doing physical therapy at [facility].    POST OP CARE:instructions were reviewed including care of the wound and dressing after surgery and when he can shower.     PAIN MANAGEMENT: Santiago De Luna was also given a pain management regime, which includes the TENS unit given to him by our DME team, along with the education required for its use. He was also instructed regarding the ice wrap and Polar Wave at home, that will be in place after surgery and his postoperative pain medications.     PAIN MEDICATION:  Roxicodone 5mg 1-2 po q 4-6 hours prn pain States he is okay to take no adverse effects with oxycodone.  Phenergan 25 mg one p.o. q.4-6 hours p.r.n. nausea and vomiting.  Celebrex 200 mg BID  Robaxin 500mg TID PRN  Lyrica 75mg BID  Aspirin 325mg daily x 6 weeks for DVT prophylaxis starting on the evening after surgery.    Post op meds to be delivered bedside prior to discharge. Deliver to family if patient is in surgery at 5pm.     Patient was instructed not to take benzodiazepines with opioid and Lyrica during the perioperative period. Patient denies history of  seizures.     Patient will also use bilateral TEDs on lower extremities, SCDs during surgery, and early ambulation post-op. If the patient was previously taking 81mg baby aspirin, they may have been told not to hold for procedure.     Patient was also told to buy over the counter Prilosec medication and take it once daily for GI protection as long as they are taking NSAIDs or Aspirin.    DVT prophylaxis was discussed with the patient today including risk factors for developing DVTs and history of DVTs. The patient was asked if any specific recommendations were given from the doctor/s that did pre-operative surgical clearance. I may have prescribed a mechanical DVT prophylaxis device, , for the above listed patient, to reduce the risk for clot formation and complications that can arise from a DVT. In my professional medical opinion, I consider this medically necessary and have prescribed the device for the purpose of postoperative treatment and rehabilitation. This can treat both the acute and chronic aspects of the inflammatory reaction that accompanies trauma and surgery. Additionally, I am prescribing mechanical DVT prophylaxis because the patient will have restricted mobility post-operatively and is at high risk for DVT. Failure to approve this device will compromise the outcome of this patient's healing process.     Patient was asked if they were taking or using OCP pills or devices. If they answered yes, then they were instructed to stop using OCPs at this pre-operative appointment until 2 months post-op to help prevent DVT development. They understand that there are other forms of birth control that do not involve hormones. They expressed understanding that ignoring/not following this instruction could result in a DVT which could turn into a deadly pulmonary embolism.      The patient was told that narcotic pain medications may make them drowsy and instructions were given to not sign legal documents,  drive or operate heavy machinery, cars, or equipment while under the influence of narcotic medications.     Dr. Schwarz was present in clinic during this pre-op evaluation. The patient was offered the opportunity to ask Dr. Schwarz any further questions regarding the procedure which may not have been addressed during their previous informed consent discussion. The patient has declined to see Dr. Schwarz today.    As there were no other questions to be asked, he was given my business card along with Dr. Schwarz business card if he has any questions or concerns prior to surgery or in the postop period.

## 2024-11-06 NOTE — H&P
"Santiago De Luna  is here for a completion of his perioperative paperwork. he  Is scheduled to undergo Right shoulder  04292 Arthroscopy, decompression of subacromial space with partial acriomioplasty with or without coracoacromial release with ROTATOR CUFF REPAIR (BioBrace augmentation)  84348 Biceps tenodesis  52071 Arthroscopy, shoulder, debridement, extensive  25411 Arthroscopy, distal claviculectomy including distal articular surface (A-C resection)    on 11/122024.      He is a healthy individual and does need clearance for this procedure.   Per preop center, "Patient is ready for surgery.  Labs are acceptable for procedure."    Risks, indications and benefits of the surgical procedure were discussed with the patient. All questions with regard to surgery, rehab, expected return to functional activities, activities of daily living and recreational endeavors were answered to his satisfaction.    Discussed COVID-19 with the patient, they are aware of our current policies and procedures, were given the option of delaying surgery, and they elect to proceed.    Patient was informed and understands the risks of surgery are greater for patients with a current condition or history of heart disease, obesity, clotting disorders, recurrent infections, steroid use, current or past smoking, and factors such as sedentary lifestyle and noncompliance with medications, therapy or follow-up. The degree of the increased risk is hard to estimate with any degree of precision.    Once no other questions were asked, a brief history and physical exam was then performed.    PAST MEDICAL HISTORY:   Past Medical History:   Diagnosis Date    Anxiety disorder, unspecified     Male erectile dysfunction, unspecified      PAST SURGICAL HISTORY:   Past Surgical History:   Procedure Laterality Date    ANTERIOR CRUCIATE LIGAMENT REPAIR Bilateral     HERNIA REPAIR  09/2017    ROTATOR CUFF REPAIR Bilateral      FAMILY HISTORY:   Family History "   Problem Relation Name Age of Onset    Alcohol abuse Mother Debora Heart     Early death Mother Debora Heart     Aortic aneurysm Mother Debora Heart     Stroke Father Mauricio De Luna     Early death Father Mauricio De Luna     Hearing loss Father Mauricio De Luna     Heart disease Father Mauricio De Luna     Prostate cancer Father Mauricio De Luna     Dementia Father Mauricio De Luna     Obesity Sister       SOCIAL HISTORY:   Social History     Socioeconomic History    Marital status:    Tobacco Use    Smoking status: Never    Smokeless tobacco: Never   Substance and Sexual Activity    Alcohol use: Yes     Alcohol/week: 28.0 standard drinks of alcohol     Types: 14 Cans of beer, 14 Shots of liquor per week    Drug use: Yes     Frequency: 5.0 times per week     Types: Marijuana    Sexual activity: Yes     Partners: Female     Birth control/protection: None     Social Drivers of Health     Financial Resource Strain: Low Risk  (8/13/2024)    Overall Financial Resource Strain (CARDIA)     Difficulty of Paying Living Expenses: Not hard at all   Food Insecurity: No Food Insecurity (8/13/2024)    Hunger Vital Sign     Worried About Running Out of Food in the Last Year: Never true     Ran Out of Food in the Last Year: Never true   Physical Activity: Sufficiently Active (8/13/2024)    Exercise Vital Sign     Days of Exercise per Week: 5 days     Minutes of Exercise per Session: 40 min   Stress: No Stress Concern Present (8/13/2024)    [employer] - Occupational Stress Questionnaire     Feeling of Stress : Not at all   Housing Stability: Unknown (8/13/2024)    Housing Stability Vital Sign     Unable to Pay for Housing in the Last Year: No       MEDICATIONS:   Current Outpatient Medications:     meloxicam (MOBIC) 15 MG tablet, Take 1 tablet (15 mg total) by mouth once daily., Disp: 30 tablet, Rfl: 2    busPIRone (BUSPAR) 5 MG Tab, TAKE 1 TABLET(5 MG) BY MOUTH THREE TIMES DAILY AS NEEDED  FOR ANXIETY (Patient not taking: Reported on 11/6/2024), Disp: 90 tablet, Rfl: 3    celecoxib (CELEBREX) 200 MG capsule, Take 1 capsule (200 mg total) by mouth 2 (two) times daily. (Patient not taking: Reported on 11/6/2024), Disp: 60 capsule, Rfl: 1    meclizine (ANTIVERT) 25 mg tablet, Take 1 tablet (25 mg total) by mouth 3 (three) times daily as needed for Dizziness. (Patient not taking: Reported on 11/6/2024), Disp: 90 tablet, Rfl: 1    tadalafiL (CIALIS) 5 MG tablet, Take 1 tablet (5 mg total) by mouth daily as needed for Erectile Dysfunction. (Patient not taking: Reported on 11/6/2024), Disp: 20 tablet, Rfl: 11    tamsulosin (FLOMAX) 0.4 mg Cap, Take 1 capsule (0.4 mg total) by mouth once daily., Disp: 30 capsule, Rfl: 11  ALLERGIES:   Review of patient's allergies indicates:   Allergen Reactions    Indomethacin      Diarrhea and gas    Shellfish derived Itching, Rash and Swelling     Mussels,clams,shrimps- rash  Swelling of face      Hydrocodone Hives, Itching and Rash    Hydrocodone-acetaminophen Itching and Rash    Mupirocin Diarrhea, Nausea And Vomiting and Other (See Comments)       Review of Systems   Constitution: Negative. Negative for chills, fever and night sweats.   HENT: Negative for congestion and headaches.    Eyes: Negative for blurred vision, left vision loss and right vision loss.   Cardiovascular: Negative for chest pain and syncope.   Respiratory: Negative for cough and shortness of breath.    Endocrine: Negative for polydipsia, polyphagia and polyuria.   Hematologic/Lymphatic: Negative for bleeding problem. Does not bruise/bleed easily.   Skin: Negative for dry skin, itching and rash.   Musculoskeletal: Negative for falls and muscle weakness.   Gastrointestinal: Negative for abdominal pain and bowel incontinence.   Genitourinary: Negative for bladder incontinence and nocturia.   Neurological: Negative for disturbances in coordination, loss of balance and seizures.   Psychiatric/Behavioral:  Negative for depression. The patient does not have insomnia.    Allergic/Immunologic: Negative for hives and persistent infections.     PHYSICAL EXAM:  GEN: A&Ox3, WD WN NAD  HEENT: WNL  CHEST: CTAB, no W/R/R  HEART: RRR, no M/R/G   ABD: Soft, NT ND, BS x4 QUADS  MS: Refer to previous note for detailed MS exam  NEURO: CN II-XII intact       The surgical consent was then reviewed with the patient, who agreed with all the contents of the consent form and it was signed. he was instructed to wait for a phone call from the anesthesia department prior to surgery to discuss past medical history, medications, and clearance. Also, informed he may be required to get additional testing per the anesthesia department prior to having surgery.     PHYSICAL THERAPY:  He was also instructed regarding physical therapy and will begin POD # 1-3. He is doing physical therapy at [facility].    POST OP CARE:instructions were reviewed including care of the wound and dressing after surgery and when he can shower.     PAIN MANAGEMENT: Santiago De Luna was also given a pain management regime, which includes the TENS unit given to him by our DME team, along with the education required for its use. He was also instructed regarding the ice wrap and Polar Wave at home, that will be in place after surgery and his postoperative pain medications.     PAIN MEDICATION:  Roxicodone 5mg 1-2 po q 4-6 hours prn pain States he is okay to take no adverse effects with oxycodone.  Phenergan 25 mg one p.o. q.4-6 hours p.r.n. nausea and vomiting.  Celebrex 200 mg BID  Robaxin 500mg TID PRN  Lyrica 75mg BID  Aspirin 325mg daily x 6 weeks for DVT prophylaxis starting on the evening after surgery.    Post op meds to be delivered bedside prior to discharge. Deliver to family if patient is in surgery at 5pm.     Patient was instructed not to take benzodiazepines with opioid and Lyrica during the perioperative period. Patient denies history of  seizures.     Patient will also use bilateral TEDs on lower extremities, SCDs during surgery, and early ambulation post-op. If the patient was previously taking 81mg baby aspirin, they may have been told not to hold for procedure.     Patient was also told to buy over the counter Prilosec medication and take it once daily for GI protection as long as they are taking NSAIDs or Aspirin.    DVT prophylaxis was discussed with the patient today including risk factors for developing DVTs and history of DVTs. The patient was asked if any specific recommendations were given from the doctor/s that did pre-operative surgical clearance. I may have prescribed a mechanical DVT prophylaxis device, , for the above listed patient, to reduce the risk for clot formation and complications that can arise from a DVT. In my professional medical opinion, I consider this medically necessary and have prescribed the device for the purpose of postoperative treatment and rehabilitation. This can treat both the acute and chronic aspects of the inflammatory reaction that accompanies trauma and surgery. Additionally, I am prescribing mechanical DVT prophylaxis because the patient will have restricted mobility post-operatively and is at high risk for DVT. Failure to approve this device will compromise the outcome of this patient's healing process.     Patient was asked if they were taking or using OCP pills or devices. If they answered yes, then they were instructed to stop using OCPs at this pre-operative appointment until 2 months post-op to help prevent DVT development. They understand that there are other forms of birth control that do not involve hormones. They expressed understanding that ignoring/not following this instruction could result in a DVT which could turn into a deadly pulmonary embolism.      The patient was told that narcotic pain medications may make them drowsy and instructions were given to not sign legal documents,  drive or operate heavy machinery, cars, or equipment while under the influence of narcotic medications.     Dr. Schwarz was present in clinic during this pre-op evaluation. The patient was offered the opportunity to ask Dr. Schwarz any further questions regarding the procedure which may not have been addressed during their previous informed consent discussion. The patient has declined to see Dr. Schwarz today.    As there were no other questions to be asked, he was given my business card along with Dr. Schwarz business card if he has any questions or concerns prior to surgery or in the postop period.

## 2024-11-11 ENCOUNTER — TELEPHONE (OUTPATIENT)
Dept: SPORTS MEDICINE | Facility: CLINIC | Age: 64
End: 2024-11-11
Payer: COMMERCIAL

## 2024-11-11 NOTE — PROGRESS NOTES
Patient is scheduled for Right shoulder arthroscopic Revision rotator cuff repair.  RoHI score is 6.  Since this is a revision rotator cuff repair, augmentation of repair is indicated.

## 2024-11-11 NOTE — TELEPHONE ENCOUNTER
Spoke with patient and let him know to arrive to surgery tomorrow on 11/12/24 at 6:30 am in building [facility].

## 2024-11-12 ENCOUNTER — ANESTHESIA EVENT (OUTPATIENT)
Dept: SURGERY | Facility: HOSPITAL | Age: 64
End: 2024-11-12
Payer: COMMERCIAL

## 2024-11-12 ENCOUNTER — ANESTHESIA (OUTPATIENT)
Dept: SURGERY | Facility: HOSPITAL | Age: 64
End: 2024-11-12
Payer: COMMERCIAL

## 2024-11-12 ENCOUNTER — HOSPITAL ENCOUNTER (OUTPATIENT)
Facility: HOSPITAL | Age: 64
Discharge: HOME | End: 2024-11-12
Attending: ORTHOPAEDIC SURGERY | Admitting: ORTHOPAEDIC SURGERY
Payer: COMMERCIAL

## 2024-11-12 VITALS
WEIGHT: 196 LBS | DIASTOLIC BLOOD PRESSURE: 76 MMHG | SYSTOLIC BLOOD PRESSURE: 131 MMHG | BODY MASS INDEX: 26.55 KG/M2 | RESPIRATION RATE: 23 BRPM | OXYGEN SATURATION: 96 % | TEMPERATURE: 98 F | HEART RATE: 75 BPM | HEIGHT: 72 IN

## 2024-11-12 DIAGNOSIS — M75.101 ROTATOR CUFF SYNDROME, RIGHT: ICD-10-CM

## 2024-11-12 DIAGNOSIS — M25.811 IMPINGEMENT OF RIGHT SHOULDER: ICD-10-CM

## 2024-11-12 DIAGNOSIS — S46.011A TRAUMATIC COMPLETE TEAR OF RIGHT ROTATOR CUFF, INITIAL ENCOUNTER: ICD-10-CM

## 2024-11-12 PROCEDURE — A4216 STERILE WATER/SALINE, 10 ML: HCPCS | Performed by: PHYSICIAN ASSISTANT

## 2024-11-12 PROCEDURE — 63600175 PHARM REV CODE 636 W HCPCS: Performed by: PHYSICIAN ASSISTANT

## 2024-11-12 PROCEDURE — 64450 NJX AA&/STRD OTHER PN/BRANCH: CPT | Performed by: ANESTHESIOLOGY

## 2024-11-12 PROCEDURE — 25000003 PHARM REV CODE 250: Performed by: NURSE ANESTHETIST, CERTIFIED REGISTERED

## 2024-11-12 PROCEDURE — 25000003 PHARM REV CODE 250: Performed by: PHYSICIAN ASSISTANT

## 2024-11-12 PROCEDURE — 25000003 PHARM REV CODE 250: Performed by: ORTHOPAEDIC SURGERY

## 2024-11-12 PROCEDURE — 36000710: Performed by: ORTHOPAEDIC SURGERY

## 2024-11-12 PROCEDURE — 36000711: Performed by: ORTHOPAEDIC SURGERY

## 2024-11-12 PROCEDURE — 63600175 PHARM REV CODE 636 W HCPCS: Performed by: NURSE ANESTHETIST, CERTIFIED REGISTERED

## 2024-11-12 PROCEDURE — 71000015 HC POSTOP RECOV 1ST HR: Performed by: ORTHOPAEDIC SURGERY

## 2024-11-12 PROCEDURE — 71000039 HC RECOVERY, EACH ADD'L HOUR: Performed by: ORTHOPAEDIC SURGERY

## 2024-11-12 PROCEDURE — C1889 IMPLANT/INSERT DEVICE, NOC: HCPCS | Performed by: ORTHOPAEDIC SURGERY

## 2024-11-12 PROCEDURE — 94761 N-INVAS EAR/PLS OXIMETRY MLT: CPT

## 2024-11-12 PROCEDURE — 37000008 HC ANESTHESIA 1ST 15 MINUTES: Performed by: ORTHOPAEDIC SURGERY

## 2024-11-12 PROCEDURE — 64416 NJX AA&/STRD BRCH PL NFS IMG: CPT | Performed by: ANESTHESIOLOGY

## 2024-11-12 PROCEDURE — 27201423 OPTIME MED/SURG SUP & DEVICES STERILE SUPPLY: Performed by: ORTHOPAEDIC SURGERY

## 2024-11-12 PROCEDURE — 37000009 HC ANESTHESIA EA ADD 15 MINS: Performed by: ORTHOPAEDIC SURGERY

## 2024-11-12 PROCEDURE — 71000033 HC RECOVERY, INTIAL HOUR: Performed by: ORTHOPAEDIC SURGERY

## 2024-11-12 PROCEDURE — 99900035 HC TECH TIME PER 15 MIN (STAT)

## 2024-11-12 PROCEDURE — 63600175 PHARM REV CODE 636 W HCPCS: Performed by: ANESTHESIOLOGY

## 2024-11-12 PROCEDURE — C1713 ANCHOR/SCREW BN/BN,TIS/BN: HCPCS | Performed by: ORTHOPAEDIC SURGERY

## 2024-11-12 PROCEDURE — 76942 ECHO GUIDE FOR BIOPSY: CPT | Performed by: ANESTHESIOLOGY

## 2024-11-12 PROCEDURE — 25000003 PHARM REV CODE 250: Performed by: ANESTHESIOLOGY

## 2024-11-12 DEVICE — IMPLANT COLLAGEN 23X30X5MM: Type: IMPLANTABLE DEVICE | Site: SHOULDER | Status: FUNCTIONAL

## 2024-11-12 DEVICE — HEALIX ADVANCE BR 3 SUTURE ANCHOR W/DYNACORD TCP/PLGA ABSORBABLE ANCHOR (1) BLUE (1) WHITE/BLUE/GREEN STRIPED (1) WHITE/BLACK STRIPED, SIZE 2 (5 METRIC) DYNACORD SUTURE, 36" (91CM) 5.5MM
Type: IMPLANTABLE DEVICE | Site: SHOULDER | Status: FUNCTIONAL
Brand: HEALIX ADVANCE BR 3 SUTURE ANCHOR W/DYNACORD

## 2024-11-12 DEVICE — HEALIX ADVANCE BR ANCHOR W/ DYNATAPE SUTURE TCP/PLGA ABSORBABLE ANCHOR WITH (1) 2.5MM BLACK/BLUE DYNATAPE BRAIDED FLAT SUTURE, 36INCH (91 CM) AND (1) WHITE/BLUE/GREEN, SIZE 2 (5 METRIC) DYNACORD BRAIDED SUTURE, 36INCH (91CM), (1) UTILITY SHUTTLE SUTURE BLACK 5.5MM
Type: IMPLANTABLE DEVICE | Site: SHOULDER | Status: FUNCTIONAL
Brand: HEALIX ADVANCE DYNATAPE DYNACORD

## 2024-11-12 DEVICE — HEALIX ADVANCE SP PEEK ANCHOR 5.5MM
Type: IMPLANTABLE DEVICE | Site: SHOULDER | Status: FUNCTIONAL
Brand: HEALIX ADVANCE

## 2024-11-12 DEVICE — IMPLANTABLE DEVICE: Type: IMPLANTABLE DEVICE | Site: SHOULDER | Status: FUNCTIONAL

## 2024-11-12 RX ORDER — MUPIROCIN 20 MG/G
OINTMENT TOPICAL
Status: DISCONTINUED | OUTPATIENT
Start: 2024-11-12 | End: 2024-11-12 | Stop reason: HOSPADM

## 2024-11-12 RX ORDER — SODIUM CHLORIDE 0.9 % (FLUSH) 0.9 %
10 SYRINGE (ML) INJECTION CONTINUOUS
Status: DISCONTINUED | OUTPATIENT
Start: 2024-11-12 | End: 2024-11-12 | Stop reason: HOSPADM

## 2024-11-12 RX ORDER — MIDAZOLAM HYDROCHLORIDE 1 MG/ML
1 INJECTION, SOLUTION INTRAMUSCULAR; INTRAVENOUS
Status: DISCONTINUED | OUTPATIENT
Start: 2024-11-12 | End: 2024-11-12 | Stop reason: HOSPADM

## 2024-11-12 RX ORDER — HYDROMORPHONE HYDROCHLORIDE 1 MG/ML
0.2 INJECTION, SOLUTION INTRAMUSCULAR; INTRAVENOUS; SUBCUTANEOUS EVERY 5 MIN PRN
Status: DISCONTINUED | OUTPATIENT
Start: 2024-11-12 | End: 2024-11-12 | Stop reason: HOSPADM

## 2024-11-12 RX ORDER — METHOCARBAMOL 500 MG/1
1000 TABLET, FILM COATED ORAL ONCE AS NEEDED
Status: COMPLETED | OUTPATIENT
Start: 2024-11-12 | End: 2024-11-12

## 2024-11-12 RX ORDER — ROCURONIUM BROMIDE 10 MG/ML
INJECTION, SOLUTION INTRAVENOUS
Status: DISCONTINUED | OUTPATIENT
Start: 2024-11-12 | End: 2024-11-12

## 2024-11-12 RX ORDER — CELECOXIB 200 MG/1
400 CAPSULE ORAL
Status: COMPLETED | OUTPATIENT
Start: 2024-11-12 | End: 2024-11-12

## 2024-11-12 RX ORDER — FENTANYL CITRATE 50 UG/ML
INJECTION, SOLUTION INTRAMUSCULAR; INTRAVENOUS
Status: DISCONTINUED | OUTPATIENT
Start: 2024-11-12 | End: 2024-11-12

## 2024-11-12 RX ORDER — OXYCODONE HYDROCHLORIDE 5 MG/1
5 TABLET ORAL EVERY 4 HOURS PRN
Status: DISCONTINUED | OUTPATIENT
Start: 2024-11-12 | End: 2024-11-12 | Stop reason: HOSPADM

## 2024-11-12 RX ORDER — LABETALOL HYDROCHLORIDE 5 MG/ML
INJECTION, SOLUTION INTRAVENOUS
Status: DISCONTINUED | OUTPATIENT
Start: 2024-11-12 | End: 2024-11-12

## 2024-11-12 RX ORDER — ROPIVACAINE/EPI/CLONIDINE/KET 2.46-0.005
SYRINGE (ML) INJECTION
Status: DISCONTINUED | OUTPATIENT
Start: 2024-11-12 | End: 2024-11-12 | Stop reason: HOSPADM

## 2024-11-12 RX ORDER — CEFAZOLIN 2 G/1
2 INJECTION, POWDER, FOR SOLUTION INTRAMUSCULAR; INTRAVENOUS
Status: DISCONTINUED | OUTPATIENT
Start: 2024-11-12 | End: 2024-11-12 | Stop reason: HOSPADM

## 2024-11-12 RX ORDER — PROPOFOL 10 MG/ML
VIAL (ML) INTRAVENOUS
Status: DISCONTINUED | OUTPATIENT
Start: 2024-11-12 | End: 2024-11-12

## 2024-11-12 RX ORDER — SODIUM CHLORIDE 0.9 % (FLUSH) 0.9 %
3 SYRINGE (ML) INJECTION
Status: DISCONTINUED | OUTPATIENT
Start: 2024-11-12 | End: 2024-11-12 | Stop reason: HOSPADM

## 2024-11-12 RX ORDER — ROPIVACAINE HYDROCHLORIDE 2 MG/ML
INJECTION, SOLUTION EPIDURAL; INFILTRATION; PERINEURAL CONTINUOUS
Status: DISCONTINUED | OUTPATIENT
Start: 2024-11-12 | End: 2024-11-12 | Stop reason: HOSPADM

## 2024-11-12 RX ORDER — HYDRALAZINE HYDROCHLORIDE 20 MG/ML
INJECTION INTRAMUSCULAR; INTRAVENOUS
Status: DISCONTINUED | OUTPATIENT
Start: 2024-11-12 | End: 2024-11-12

## 2024-11-12 RX ORDER — ONDANSETRON HYDROCHLORIDE 2 MG/ML
4 INJECTION, SOLUTION INTRAVENOUS ONCE AS NEEDED
Status: DISCONTINUED | OUTPATIENT
Start: 2024-11-12 | End: 2024-11-12 | Stop reason: HOSPADM

## 2024-11-12 RX ORDER — FENTANYL CITRATE 50 UG/ML
100 INJECTION, SOLUTION INTRAMUSCULAR; INTRAVENOUS
Status: DISCONTINUED | OUTPATIENT
Start: 2024-11-12 | End: 2024-11-12 | Stop reason: HOSPADM

## 2024-11-12 RX ORDER — BUPIVACAINE HYDROCHLORIDE 2.5 MG/ML
INJECTION, SOLUTION EPIDURAL; INFILTRATION; INTRACAUDAL
Status: COMPLETED | OUTPATIENT
Start: 2024-11-12 | End: 2024-11-12

## 2024-11-12 RX ORDER — KETAMINE HCL IN 0.9 % NACL 50 MG/5 ML
SYRINGE (ML) INTRAVENOUS
Status: DISCONTINUED | OUTPATIENT
Start: 2024-11-12 | End: 2024-11-12

## 2024-11-12 RX ORDER — DEXAMETHASONE SODIUM PHOSPHATE 4 MG/ML
INJECTION, SOLUTION INTRA-ARTICULAR; INTRALESIONAL; INTRAMUSCULAR; INTRAVENOUS; SOFT TISSUE
Status: DISCONTINUED | OUTPATIENT
Start: 2024-11-12 | End: 2024-11-12

## 2024-11-12 RX ORDER — ROPIVACAINE HYDROCHLORIDE 5 MG/ML
INJECTION, SOLUTION EPIDURAL; INFILTRATION; PERINEURAL
Status: COMPLETED | OUTPATIENT
Start: 2024-11-12 | End: 2024-11-12

## 2024-11-12 RX ORDER — LIDOCAINE HYDROCHLORIDE 20 MG/ML
INJECTION, SOLUTION EPIDURAL; INFILTRATION; INTRACAUDAL; PERINEURAL
Status: DISCONTINUED | OUTPATIENT
Start: 2024-11-12 | End: 2024-11-12

## 2024-11-12 RX ORDER — ACETAMINOPHEN 500 MG
1000 TABLET ORAL
Status: COMPLETED | OUTPATIENT
Start: 2024-11-12 | End: 2024-11-12

## 2024-11-12 RX ADMIN — FENTANYL CITRATE 100 MCG: 50 INJECTION INTRAMUSCULAR; INTRAVENOUS at 09:11

## 2024-11-12 RX ADMIN — METHOCARBAMOL 1000 MG: 500 TABLET ORAL at 12:11

## 2024-11-12 RX ADMIN — Medication 20 MG: at 09:11

## 2024-11-12 RX ADMIN — Medication 10 MG: at 10:11

## 2024-11-12 RX ADMIN — LABETALOL HYDROCHLORIDE 2.5 MG: 5 INJECTION, SOLUTION INTRAVENOUS at 10:11

## 2024-11-12 RX ADMIN — FENTANYL CITRATE 50 MCG: 50 INJECTION INTRAMUSCULAR; INTRAVENOUS at 11:11

## 2024-11-12 RX ADMIN — ACETAMINOPHEN 1000 MG: 500 TABLET ORAL at 08:11

## 2024-11-12 RX ADMIN — LIDOCAINE HYDROCHLORIDE 100 MG: 20 INJECTION, SOLUTION EPIDURAL; INFILTRATION; INTRACAUDAL at 09:11

## 2024-11-12 RX ADMIN — FENTANYL CITRATE 50 MCG: 50 INJECTION INTRAMUSCULAR; INTRAVENOUS at 10:11

## 2024-11-12 RX ADMIN — PROPOFOL 200 MG: 10 INJECTION, EMULSION INTRAVENOUS at 09:11

## 2024-11-12 RX ADMIN — ROPIVACAINE HYDROCHLORIDE 10 ML: 5 INJECTION EPIDURAL; INFILTRATION; PERINEURAL at 08:11

## 2024-11-12 RX ADMIN — CELECOXIB 400 MG: 200 CAPSULE ORAL at 08:11

## 2024-11-12 RX ADMIN — MIDAZOLAM HYDROCHLORIDE 1 MG: 1 INJECTION, SOLUTION INTRAMUSCULAR; INTRAVENOUS at 08:11

## 2024-11-12 RX ADMIN — OXYCODONE 5 MG: 5 TABLET ORAL at 12:11

## 2024-11-12 RX ADMIN — ROCURONIUM BROMIDE 50 MG: 10 INJECTION INTRAVENOUS at 09:11

## 2024-11-12 RX ADMIN — SODIUM CHLORIDE: 9 INJECTION, SOLUTION INTRAVENOUS at 09:11

## 2024-11-12 RX ADMIN — Medication 10 MG: at 11:11

## 2024-11-12 RX ADMIN — HYDROMORPHONE HYDROCHLORIDE 0.2 MG: 1 INJECTION, SOLUTION INTRAMUSCULAR; INTRAVENOUS; SUBCUTANEOUS at 12:11

## 2024-11-12 RX ADMIN — PROPOFOL 50 MG: 10 INJECTION, EMULSION INTRAVENOUS at 11:11

## 2024-11-12 RX ADMIN — CEFAZOLIN 2 G: 330 INJECTION, POWDER, FOR SOLUTION INTRAMUSCULAR; INTRAVENOUS at 09:11

## 2024-11-12 RX ADMIN — HYDRALAZINE HYDROCHLORIDE 5 MG: 20 INJECTION, SOLUTION INTRAMUSCULAR; INTRAVENOUS at 11:11

## 2024-11-12 RX ADMIN — PROPOFOL 50 MG: 10 INJECTION, EMULSION INTRAVENOUS at 09:11

## 2024-11-12 RX ADMIN — Medication 10 ML: at 08:11

## 2024-11-12 RX ADMIN — BUPIVACAINE HYDROCHLORIDE 5 ML: 2.5 INJECTION, SOLUTION EPIDURAL; INFILTRATION; INTRACAUDAL at 08:11

## 2024-11-12 RX ADMIN — ROCURONIUM BROMIDE 10 MG: 10 INJECTION INTRAVENOUS at 10:11

## 2024-11-12 RX ADMIN — HYDRALAZINE HYDROCHLORIDE 5 MG: 20 INJECTION, SOLUTION INTRAMUSCULAR; INTRAVENOUS at 10:11

## 2024-11-12 RX ADMIN — FENTANYL CITRATE 50 MCG: 50 INJECTION INTRAMUSCULAR; INTRAVENOUS at 08:11

## 2024-11-12 RX ADMIN — PROPOFOL 30 MG: 10 INJECTION, EMULSION INTRAVENOUS at 09:11

## 2024-11-12 RX ADMIN — ROCURONIUM BROMIDE 20 MG: 10 INJECTION INTRAVENOUS at 09:11

## 2024-11-12 RX ADMIN — DEXAMETHASONE SODIUM PHOSPHATE 4 MG: 4 INJECTION INTRA-ARTICULAR; INTRALESIONAL; INTRAMUSCULAR; INTRAVENOUS; SOFT TISSUE at 09:11

## 2024-11-12 RX ADMIN — Medication: at 12:11

## 2024-11-12 RX ADMIN — SUGAMMADEX 200 MG: 100 INJECTION, SOLUTION INTRAVENOUS at 11:11

## 2024-11-12 RX ADMIN — PROPOFOL 30 MG: 10 INJECTION, EMULSION INTRAVENOUS at 10:11

## 2024-11-12 RX ADMIN — BUPIVACAINE HYDROCHLORIDE 20 ML: 2.5 INJECTION, SOLUTION EPIDURAL; INFILTRATION; INTRACAUDAL; PERINEURAL at 08:11

## 2024-11-12 NOTE — PLAN OF CARE
Discharge instructions reviewed and pt verbalizes understanding. Pain control appropriate. CADD pump in place. Dressing is clean, dry, and intact. VSS and afebrile. Meds delivered to bedside. Pt ambulatory. AVS complete.

## 2024-11-12 NOTE — ANESTHESIA PROCEDURE NOTES
Intubation    Date/Time: 11/12/2024 9:13 AM    Performed by: Monica Bird CRNA  Authorized by: Laron Kellogg MD    Intubation:     Induction:  Intravenous    Intubated:  Postinduction    Mask Ventilation:  Easy mask    Attempts:  1    Attempted By:  CRNA    Method of Intubation:  Video laryngoscopy    Blade:  Jiang 3    Laryngeal View Grade: Grade I - full view of cords      Difficult Airway Encountered?: No      Complications:  None    Airway Device:  Oral endotracheal tube    Airway Device Size:  7.5    Style/Cuff Inflation:  Cuffed (inflated to minimal occlusive pressure)    Tube secured:  23    Secured at:  The lips    Placement Verified By:  Capnometry    Complicating Factors:  None    Findings Post-Intubation:  Atraumatic/condition of teeth unchanged and BS equal bilateral

## 2024-11-12 NOTE — DISCHARGE INSTRUCTIONS
"     [address], [place]                                    [phone]             Postoperative Instructions for Shoulder Surgery               Your Surgery Included:   Open              Arthroscopic [] Instability Repair     [x] Diagnostic   [] Rotator Cuff Repair     [x] Lysis of Adhesions / Manipulation [] Distal Clavicle Resection    [x] Debridement [] Biceps Tenodesis       [] Labrum  [x] Rotator Cuff   [] Cartilage   [] Contracture Release    [] SLAP Repair   [] Fracture Fixation    [] Instability Repair  [] AC Joint Reconstruction      [x] Rotator Cuff Repair [] Joint Replacement     [] Subacromial Decompression / Bursectomy   [] Hemiarthroplasty  [] Total Shoulder    [] Biceps Tenotomy / Tenodesis    [] Reverse Total Shoulder       [] Distal Clavicle Resection          [] Amniox Arthrocentesis    [] Contracture Release                 Call our office ([phone]) immediately or message through [employer] if you experience any of the following:      Excessive bleeding or pus like drainage at the incision site      Uncontrollable pain not relieved by pain medication      Excessive swelling or redness at the incision site      Fever above 101.5 degrees not controlled with Tylenol or Motrin      Shortness of Breath or severe calf pain      Any foul odor or blistering from the surgery site      Any "pop" with pain and/or deformity in the biceps muscle   FOR EMERGENCIES: [facility] is the best way to contact us. If on the weekend, page the  at [phone] who will direct your call appropriately.   1.   Pain Management: A cold therapy cuff, pain medications, local injections, TENs unit, and in some cases, regional anesthesia injections are used to manage your post-operative pain. The decision to use each of these options is based on their risks and benefits.    Medications: You were given one or more of the following medication prescriptions during your preoperative " "appointment. Follow the instructions on the bottles.     Narcotic Medication (usually Percocet, Norco, or Tramadol): Begin taking the medication before your shoulder starts to hurt. Some patients do not like to take any medication, but if you wait until your pain is severe before taking, you will be very uncomfortable for several hours waiting for the narcotic to work. Always take with food.    Nausea / Vomiting: For this issue, we prescribe Zofran or Phenergan, use this medication as directed.    Cold Therapy: You may have been sent home with a [employer] cold therapy unit and wrap for your shoulder. Fill with ice and water to the indicated fill line. You can use 20-30 minutes on then off, several times a day. This will help relieve pain and control swelling. Do not sleep with on.    Regional Anesthesia Injections (Blocks): You may have been given a regional nerve block either before or after surgery. This may make your entire shoulder numb for 2-5 days.                    2.   Diet: Eat a bland diet for the first day after surgery. Progress your diet as tolerated. Constipation may occur with Narcotic usage. We recommend Colace 100 mg twice a day while on narcotics.   3.   Activity: Spend most of the first 24 hours resting in bed, on the couch, or in a reclining chair. After the first 24 hours at home, slowly increase your activity level based on your symptoms. If a biceps tenodesis procedure was performed, avoid any aggressive lifting (over 5 lbs) or "corkscrew" motion for 6 weeks.                     4.   Dressing Change: (a) The soft, bulky dressing will be removed on the 3rd day after surgery. Place waterproof bandages at this time. Keep wounds as dry as possible for first 2 weeks. It is normal for some blood to be seen on the dressings. It is also normal for you to see apparent bruising on the skin around your incisions. If you are concerned by the drainage or the appearance of your wound site, you can send a " picture via [facility].   5.   Showering: (a) You may shower on the 3rd day after surgery. Place waterproof bandages prior to shower. It is recommended to use Saran wrap before showering. Do not submerge limb for 4 weeks or incisions completely healed in any water.    6.   Shoulder Sling (with/without Pillow attachment): You may have been sent home with a sling / pillow attachment holding your arm away from your body. You may remove the sling when changing clothes or bathing. Make sure to wear the sling while sleeping unless instructed otherwise. You may remove at rest or for exercises.           [x] You need to wear the sling with pillow for 24 hours a day for 6 weeks.   7.  Shoulder Exercises: Begin these exercises the first day after surgery in order to help you regain your motion and strength. You may do the following marked exercises for 2-5 mins five times a day:   [x] Shoulder shrugs - Shrug your shoulders up and down.   [x] Pendulums - Bend forward allowing your arm to hang down in front of you. Gently swing your arm side-to-side and front to back.                                                                                                                               [x] Passive abduction - Have a family member gently lift your arm away from your body bringing your elbow up to the level of your shoulder.                                                                                                                   [x] Shoulder rotation - With your arm at your side, have a family member gently rotate your arm internally and externally.                                                                                                                  [x] Scapular retractions - (Squeeze shoulder blades together): Squeeze shoulder blades together while slightly pulling them down (do not shrug your shoulders upward); You can perform 10-15 reps, several times throughout the day, when seated at your desk,  driving in the car, etc.                                                                                                                     [x] Pulley exercises - Put a towel or long sleeve shirt over the top of a door. Stand facing the door. Use your good arm to gently pull your operative arm up in front of you.   [x] Elbow motion - Straighten and bend your elbow.                                                                                                               [x] Ball squeezes - use ball attached to sling/pillow or soft (nerf) ball for  strengthening                                                                                                                 8.  Physical Therapy: Physical therapy is an essential component to your recovery from surgery. Your physical therapy will start in 1 days.   FIRST POSTOPERATIVE VISIT: As scheduled.

## 2024-11-12 NOTE — ANESTHESIA PROCEDURE NOTES
PECS I/II Single Shot Nerve Block    Patient location during procedure: pre-op   Block not for primary anesthetic.  Reason for block: at surgeon's request and post-op pain management   Post-op Pain Location: R shoulder pain   Start time: 11/12/2024 8:13 AM  Timeout: 11/12/2024 8:06 AM   End time: 11/12/2024 8:15 AM    Staffing  Authorizing Provider: Laron Kellogg MD  Performing Provider: Laron Kellogg MD    Staffing  Performed by: Laron Kellogg MD  Authorized by: Laron Kellogg MD    Preanesthetic Checklist  Completed: patient identified, IV checked, site marked, risks and benefits discussed, surgical consent, monitors and equipment checked, pre-op evaluation and timeout performed  Peripheral Block  Patient position: supine  Prep: ChloraPrep  Patient monitoring: heart rate, cardiac monitor, continuous pulse ox, continuous capnometry and frequent blood pressure checks  Block type: pectoral  Laterality: right  Injection technique: single shot  Needle  Needle type: Stimuplex   Needle gauge: 20 G  Needle length: 4 in  Needle localization: anatomical landmarks and ultrasound guidance   -ultrasound image captured on disc.  Assessment  Injection assessment: negative aspiration, negative parasthesia and local visualized surrounding nerve  Paresthesia pain: none  Heart rate change: no  Slow fractionated injection: yes  Pain Tolerance: comfortable throughout block and no complaints  Medications:    Medications: bupivacaine (pf) (MARCAINE) injection 0.25% - Perineural   20 mL - 11/12/2024 8:15:00 AM    Additional Notes  Performed PECS I/II  VSS.  [facility] RN monitoring vitals throughout procedure.  Patient tolerated procedure well.

## 2024-11-12 NOTE — ANESTHESIA POSTPROCEDURE EVALUATION
Anesthesia Post Evaluation    Patient: Santiago De Luna    Procedure(s) Performed: Procedure(s) (LRB):  REPAIR, ROTATOR CUFF (Right)  DEBRIDEMENT, SHOULDER, ARTHROSCOPIC (Right)    Final Anesthesia Type: general      Patient location during evaluation: PACU  Patient participation: Yes- Able to Participate  Level of consciousness: awake and alert and oriented  Post-procedure vital signs: reviewed and stable  Pain management: adequate  Airway patency: patent    PONV status at discharge: No PONV  Anesthetic complications: no      Cardiovascular status: blood pressure returned to baseline and hemodynamically stable  Respiratory status: unassisted, room air and spontaneous ventilation  Hydration status: euvolemic  Follow-up not needed.              Vitals Value Taken Time   /76 11/12/24 1346   Temp 36.7 °C (98 °F) 11/12/24 1345   Pulse 74 11/12/24 1352   Resp 25 11/12/24 1352   SpO2 96 % 11/12/24 1352   Vitals shown include unfiled device data.      Event Time   Out of Recovery 13:19:00         Pain/Ramez Score: Pain Rating Prior to Med Admin: 6 (11/12/2024 12:37 PM)  Pain Rating Post Med Admin: 3 (11/12/2024  1:18 PM)  Ramez Score: 9 (11/12/2024  1:18 PM)

## 2024-11-12 NOTE — ANESTHESIA PROCEDURE NOTES
Superficial Cervical Plexus Single Shot Nerve Block    Patient location during procedure: pre-op   Block not for primary anesthetic.  Reason for block: at surgeon's request and post-op pain management   Post-op Pain Location: R shoulder pain   Start time: 11/12/2024 8:11 AM  Timeout: 11/12/2024 8:06 AM   End time: 11/12/2024 8:13 AM    Staffing  Authorizing Provider: Laron Kellogg MD  Performing Provider: Laron Kellogg MD    Staffing  Performed by: Laron Kellogg MD  Authorized by: Laron Kellogg MD    Preanesthetic Checklist  Completed: patient identified, IV checked, site marked, risks and benefits discussed, surgical consent, monitors and equipment checked, pre-op evaluation and timeout performed  Peripheral Block  Patient position: sitting  Prep: ChloraPrep  Patient monitoring: heart rate, cardiac monitor and continuous pulse ox  Block type: superficial cervical  Laterality: right  Injection technique: single shot  Needle  Needle gauge: 25 G  Needle localization: ultrasound guidance   -ultrasound image captured on disc.  Assessment  Injection assessment: negative aspiration, negative parasthesia and local visualized surrounding nerve  Paresthesia pain: none  Heart rate change: no  Slow fractionated injection: yes  Pain Tolerance: comfortable throughout block and no complaints  Medications:    Medications: bupivacaine (pf) (MARCAINE) injection 0.25% - Perineural   5 mL - 11/12/2024 8:13:00 AM

## 2024-11-12 NOTE — OP NOTE
[facility] Surgery ([facility])  Operative Note      Date of Procedure: 11/12/2024     Procedure: Complex  1. Right shoulder Arthroscopic massive complex rotator cuff repair CPT - 19654    2. Right shoulder Arthroscopic Extensive Debridement, 40801    3. Right shoulder Arthroscopic labral debridement CPT - 67905      Complexity of case was increased based on the 5 cm size of the tear noted in both supraspinatus and infraspinatus tissue(s) with retraction of the tissue to the glenoid level and significant scare with in the region of concern.    Surgeons and Role:     * Aura Schwarz MD - Primary    Assisting Surgeon:     MD Nasima Fry SMA-Assistant    It was medically necessary for Ana Martel MD, PA-C to perform first assistant duties aiding in setup, exposure and closure.    Pre-Operative Diagnosis: Traumatic complete tear of right rotator cuff, initial encounter [S46.011A]    Post-Operative Diagnosis: Post-Op Diagnosis Codes:     * Traumatic complete tear of right rotator cuff, initial encounter [S46.011A]    Anesthesia: General    Operative Findings (including complications, if any): complex atrophic rotator cuff tear with retraction and failed previous repair, intra-articular adhesions    Description of Technical Procedures:   Shoulder Arthroscopy Procedure Note    DATE OF PROCEDURE: 11/12/2024    ATTENDING SURGEON: Surgeons and Role:     * Aura Schwarz MD - Primary  Assistants:  MD Nasima Fry SMA-Assistant    It was medically necessary for Ana Martel MD, PA-C to perform first assistant duties aiding in setup, exposure and closure.         PREOPERATIVE DIAGNOSIS:  Right shoulder    Rotator Cuff Syndrome/Disorder  M75.100 and Tear, Rotator Cuff, Non-traumatic M75.100    POSTOPERATIVE DIAGNOSIS: Right shoulder   Loose Body M24.012 Rotator Cuff Syndrome/Disorder  M75.100 and Tear, Rotator Cuff, Non-traumatic M75.100     PROCEDURES  PERFORMED:  1. Right shoulder Arthroscopic massive complex rotator cuff repair CPT - 72523    2. Right shoulder  Arthroscopic Extensive Debridement, 49800    3. Right shoulder Arthroscopic labral debridement CPT - 74260    FINDINGS:  Muscle atrophy:  Moderate  Biceps tendon status:  Complete previous tenodesis/tenotomy  Rotator cuff status:   Subscapularis: normal   Supraspinatus: Complete, Size 5 cm  Infraspinatus: Complete, Size 5 cm    ROTATOR CUFF TEAR CHARACTERIZATION:     Massive  [place]  Retracted glenoid  Atrophic tissue    ARTICULAR CARTILAGE LESION(S):  Glenoid: ICRS Grade 0      Size: none    Humeral head: ICRS Grade 0      Size: none    EXAMINATION UNDER ANESTHESIA:   Forward Flexion 180degrees  ER side 45 degrees  Abduction 80 degrees  ER in Abduction 80 degrees  IR in Abduction 20 degrees  Anterior stability 1+  Posterior stability 1+  Inferior sulcus sign side 1+  Inferior sulcus sign in ER 0        Indications For Operative Procedure: Santiago De Luna is a 64 y.o. male with persistent right shoulder pain and failure of conservative therapy. Preoperative studies revealed Rotator Cuff Syndrome/Disorder  M75.100 and Tear, Rotator Cuff, Non-traumatic M75.100. he was noted to have problems along the anterior and superior rotator cuff structures. The patient had clinical evidence of weakness and pain with overhead maneuvers. MRI was obtained revealing evidence of rotator cuff  damage, which was consistent with the above stated preoperative diagnoses.     Complexity of case was increased based on the 5 cm size of the tear noted in both supraspinatus and infraspinatus tissue(s) with retraction of the tissue to the glenoid level and significant scare with in the region of concern.    IMPLANTS UTILIZED:[employer] arthroscopy equipment , Beach Chair, Mitek Vapor, Breg Sling Shot 2 with Abduction Pillow, and Trimano  [employer] arthroscopy equipment , Beach Chair, Mitek Vapor, Arthrex Biceps Tenodesis Set, Breg Sling  Shot 2 with Abduction Pillow, Trimano and two Mitek 4.5 mm double loaded Dynacord/Tape anchors, Mitek 4.75 mm Healix knotless anchors x 2    DESCRIPTION OF PROCEDURE:   The patient was brought into the Operating Room, placed in supine position. Following application of the interscalene block in the preoperative hold area, the patient underwent general anesthesia and airway stabilization in the standard fashion. The patient was given the appropriate dose of antibiotics based on body weight. Time-out was utilized to verify Right side as the operative site.     Both lower extremities were placed in comfortable position. Nonoperative arm was carefully placed into a well padded arm stallings with appropriate position and no pressure on the nerves. The operative arm was then examined under anesthesia revealing the findings as noted in the findings section of this dictation.    The operative shoulder was then prepped and draped in a sterile fashion with chloraprep material Traction was applied. Spinal needle was used to enter the joint posteriorly. Joint was insufflated in standard fashion with 60 mL normal saline. Needle was then pulled back into subacromial region, which was anesthetized further with 0.5% ropivacaine mixture.    A #11 blade was used to make this portal. Blunt trocar tip was inserted into the glenohumeral joint and area of concern was directly visualized demonstrating   Right shoulder was then prepped and draped in a sterile fashion with chloraprep material Traction was applied. Spinal needle was used to enter the joint posteriorly.   Joint was insufflated in standard fashion with 60 mL normal saline. Needle was then pulled back into subacromial region, which was anesthetized further with 30 cc of 0.5% ropivacaine mixture.    A #11 blade was used to make this portal. Blunt trocar tip was inserted into the glenohumeral joint and area of concern was directly visualized demonstrating disruption of the biceps  tendon with associated biceps tenosynovitis. Rotator cuff was visualized and we noted the above stated findings in the operative findings portion of this dictation. Glenohumeral surfaces demonstrated the above findings. Labral structures demonstrated a type 1 SLAP lesion with labral tearing and were treated with arthroscopic debridement and stabilization with Mitek vapor probe. The synovitic tissue was removed with an arthroscopic shaver as well. Spinal needle was used to localize an anterosuperior portal, which was filled with 7.0 mm ribbed cannula, and the anterolateral portal was created and filled with a 7.0 mm ribbed cannula. Labral tearing was noted superiorly and anteriorly and was debrided with shaver and vapor probe.    We then turned our attention to the subacromial region. We then repositioned our cannulas into the subacromial space. An accessory lateral portal was also established. Again, our examination of the subacromial space is as noted above.    Attention was then turned to biceps tenodesis. Using the anterior superior portal we used the Arthrex scorpion to pass a luggage tags stitch through the tissue.  The suture was then pulled into the anterolateral portal using a crab claw device.  We then performed final fixation of the area and used the Mitek Vapr probe to release the biceps tendon and final debridement of the labral tissues.    We visualized the subacromial region from posterior and then using the same posterior portal with forward flexion of the arm to relax the subdeltoid space released the sheath of the biceps tendon.  This allowed for full exposure of the medial and lateral borders of the bicipital groove.  Spinal needle was used to localize the accessory distal anterior portal, which was created with a #11 blade. We then placed and used the passport device from the Arthrex set and drilled the area of concern medial to the biceps tendon, creating a drill hole and this was filled with the  knotless soft anchor from Arthrex. Once we deployed the Arthrex FiberTak suture excellent fixation to bone was achieved.    Suture management was then performed and we passed the blue loop towards the lateral border of the biceps tendon. We also passed the loop through the lateral border of the tendon as well. We used the sharp Arthrex penetrator to pass the blue suture through the white loop within the shoulder. This was pulled out of the anterior superior portal. The white suture was cinched down partially. The blue suture was then passed back into distal accessory portal containing the passport device allowing the striped blue suture to pass through the tissue. The blue suture was then pulled and final tension on the blue suture created the stable tenodesis picture. We cycled the blue and white sutures demonstrating excellent stability and final pictures were obtained. Again, our examination of the subacromial space is as noted above.  We obtained final pictures.     We then turned our attention to the subacromial region. We then repositioned our cannulas into the subacromial space. An accessory lateral portal was also established. Again, our examination of the subacromial space is as noted above.     We then debrided the subacromial space of adhesions and bursal inflammation that were present with a shaver and electrocautery device. The lateral, anterior and medial borders of the undersurface of the acromion were identified and cleared of soft tissue and adhesions before proceeding with the bur. The coracoacromial ligament was identified and disected free of the anterior acromion with the electrocautery device. The acromion was then identified and marked with the bur in the lateral portal. Anatomic morphology dictated that we perform a subacromial decompression utilizing a 5.5 mm bur in a lateral to medial fashion removing the anterior spur. The anterolateral edge of the acromion was removed with a high-speed  bur. . This was performed without significant difficulty. The CA ligament was left intact. The remaining portion of the AC region was treated with observation.    We then thoroughly inspected the rotator cuff from the subacromial side.There was a clearly demonstrated rotator cuff tear with size and pattern as reported in the findings section of this note. The rotator cuff was mobilized on its superficial and deep surfaces to allow maximal placement over the anatomic footprint of the greater tuberosity. The rotator cuff was mobilized on its superficial and deep surfaces to allow maximal placement over the anatomic footprint of the greater tuberosity.     Details of the Medial Row repair:      1. additional anchors were required for the size and pattern of the tear as a result we debrided further to allow for healing and the footprint region was tapped. The 4.5 mm Healix anchor was placed with double-loaded orthocord sutures. The sutures were the passed in simple fashion through the medial portion of the rotator cuff. The sutures were managed and tied with a modified Karina knot.   2.  additional anchors were required for the size and pattern of the tear as a result we debrided further to allow for healing and the footprint region was tapped. The 4.5 mm Healix anchor was placed with triple-loaded orthocord sutures. The sutures were the passed in simple fashion through the medial portion of the rotator cuff. The sutures were managed and tied with a modified Karina knot.   In addition, we placed the tap for the Mitek anchor into the footprint region.The footprint was debrided with use of an arthroscopic shaver and cecilio to allow for appropriate postoperative healing. We established posterolateral and anterolateral additional portals filled with 8.0 mm ribbed cannulas to allow for appropriate suture management. The tap for the Mitek double loaded anchor was used and the anchor deployed. The anchor was placed with  excellent fixation to bone verified. Sutures were then managed and retrieved with the Mitek manipulator. Sutures were managed, retrieved and placed sequentially through the medial soft tissue structures using a Mitek Expressew device. The sutures were then sequentially passed through the medial portion of the mobilized rotator cuff tissue from anterior to posterior Once again suture management was used and sutures sequentially tied using a modified Karina knot technique. This represented the medial row repair.     Details of the Lateral Row repair:     All sutures were left intact for placement through a Mitek knotless anchor. These sutures were then managed placed one limb of each tied suture through the working portal. Sutures were cut clean with a standard suture scissor to identify the sutures on placement through the knotless anchor. The lateral region of the tuberosity tapped for the anchor. Sutures were sequentially tightened and the Mitek knotless Healix anchor deployed capturing the lateral row of sutures under tension. Excellent lateral control was maintained. the process was repeated for one additional Mitek knotless anchor(s). The knotless anchors were placed through the remaining portion of the tuberosity with sutures from the anterior, posterior and central regions of the rotator cuff. This pattern created a bridge technique and stabilized the footprint of the rotator cuff repair completely. The repair demonstrated smooth edges and required no further repair techniques. Final pictures of the repair were obtained. With motion both internally and externally the repair remained intact.  This process was process was repeated for the one additional Mitek knotless Healix anchors for additional fixation. Excellent stability was demonstrated with a tight seal and solid repair. Arthroscopic pictures of all repair sites were obtained and saved for documentation.    Graft Application:     There was a continued  anterior and superior deficit despite the above procedural techniques as a result we used the parachute technique through BioBrace graft. Two suture anchors were placed along the medial glenoid and one limb of the suture in each anchor was placed through the graft externally outside the patient. In addition, two fiber tapes were used laterally along the graft and placed externally as well. Using a luggage tag technique the sutures through the medial row of the graft were tied together with a series of 5 throws. The other ends of these same suture were pulled through the lateral cannula drawing the graft into the joint. Two Knotless anchors were then placed following use of the punch laterally. The fiber tapes were placed through the knotless device in bridge configuration and the knotless anchors deployed. The lateral row of the graft was fixed as a result.      Medially the sutures free suture ends were tied to one another with a half-hitch technique.  Final debridement of all inflamed bursal tissues was performed. Fluid was extravasated from the joint. A 4-0 nylon sutures were used to close the arthroscopic portals. We then injected additional 0.25% ropivacaine mixture around the shoulder with a 21-gauge needle, applied Xeroform gauze, ABD pads and Medipore tape. The patient's arm was placed in a cooling unit, sling and abduction pillow as well as sterile electrode pads peripherally around the shoulder. The patient was allowed to recover from the anesthetic, was extubated and was taken to Recovery in stable condition. At the completion of case, all instrument and sponge counts were correct.    Physical therapy:     Massive size Rotator cuff protocol  Biceps tenodesis was not performed; if tenodesis was performed limit aggressive biceps flexion exercises for first 6 weeks    Start Therapy in 3-5 days     Use Sling and Abduction Pillow - For  6 weeks then discontinue pillow at 6 weeks time to protect rotator cuff  "repair and biceps tenodesis. Discontinue all immobilization in 6 weeks. Remove arm from sling immediately and move elbow and wrist as tolerated.    Cuff specific program:  Pendulum exercises and Codman's exercises in 3-5 days  protecting rotator cuff repair for 6 weeks by avoiding active motion program until 6 weeks.    PASSIVE ROM: "ER side 30 degrees, Forward Flex 90 degrees, ABD - 60 degrees   Full AAROM/PROM starting at 6 weeks as tolerated     Discharge summary:  Discharge medications per EPIC discharge notes   Discharge home when stable  Follow-up as scheduled  Activity as above  Condition Stable       Significant Surgical Tasks Conducted by the Assistant(s), if Applicable: preparation and closure    Estimated Blood Loss (EBL): * No values recorded between 11/12/2024  9:46 AM and 11/12/2024 12:06 PM *           Implants:   Implant Name Type Inv. Item Serial No.  Lot No. LRB No. Used Action   ANCHOR HEALIX ADV BR 3 LD 5.5 - [id number]  ANCHOR HEALIX ADV BR 3 LD 5.5  [employer]NTHES [id number] Right 1 Implanted   ANCHOR HEALIX ADV BR BLK 5.5MM - [id number]  ANCHOR HEALIX ADV BR BLK 5.5MM  SYNTHES [id number] Right 1 Implanted   ANCHOR HEALIX ADV BR WHT 5.5MM - [id number]  ANCHOR HEALIX ADV BR WHT 5.5MM  SYNTHES [id number] Right 1 Implanted   IMPLANT COLLAGEN [id number] - [id number]  IMPLANT COLLAGEN [id number]  [employer] [id number] Right 1 Implanted   ANCHOR HEALIX ADVANCE SP 5.5MM - [id number]  ANCHOR HEALIX ADVANCE SP 5.5MM  [employer] [id number] Right 1 Implanted   ANCHOR HEALIX ADVANCE SP 5.5MM - [id number]  ANCHOR HEALIX ADVANCE SP 5.5MM  [employer] [id number] Right 1 Implanted       Specimens:   Specimen (24h ago, onward)      None                    Condition: Good    Disposition: PACU - hemodynamically stable.    Attestation: I was present and scrubbed for the key portions of the procedure.    Discharge Note    OUTCOME: Patient tolerated treatment/procedure well without complication and is now ready for " discharge.    DISPOSITION: Home or Self Care    FINAL DIAGNOSIS:  <principal problem not specified>    FOLLOWUP: In clinic    DISCHARGE INSTRUCTIONS:  No discharge procedures on file.

## 2024-11-12 NOTE — BRIEF OP NOTE
[facility] - [facility] ([facility])  Brief Operative Note    Surgery Date: 11/12/2024     Surgeons and Role:     * Aura Schwarz MD - Primary    Assisting Surgeon: None    Pre-op Diagnosis:  Traumatic complete tear of right rotator cuff, initial encounter [S46.011A]    Post-op Diagnosis:  Post-Op Diagnosis Codes:     * Traumatic complete tear of right rotator cuff, initial encounter [S46.011A]    Procedure(s) (LRB):  REPAIR, ROTATOR CUFF (Right)  ARTHROSCOPY,SHOULDER,WITH BICEPS TENODESIS (Right)  DEBRIDEMENT, SHOULDER, ARTHROSCOPIC (Right)  ARTHROSCOPY, SHOULDER, WITH DISTAL CLAVICLE EXCISION (Right)    Anesthesia: General    Operative Findings: tear of rotator cuff noted    Estimated Blood Loss: * No values recorded between 11/12/2024  9:46 AM and 11/12/2024 11:29 AM *         Specimens:   Specimen (24h ago, onward)      None              Discharge Note    OUTCOME: Patient tolerated treatment/procedure well without complication and is now ready for discharge.    DISPOSITION: Home or Self Care    FINAL DIAGNOSIS:  <principal problem not specified>    FOLLOWUP: In clinic    DISCHARGE INSTRUCTIONS:  No discharge procedures on file.

## 2024-11-12 NOTE — ANESTHESIA PROCEDURE NOTES
Interscalene Continuous Nerve Catheter    Patient location during procedure: pre-op   Block not for primary anesthetic.  Reason for block: at surgeon's request and post-op pain management   Post-op Pain Location: R shoulder pain   Start time: 11/12/2024 8:07 AM  Timeout: 11/12/2024 8:06 AM   End time: 11/12/2024 8:11 AM    Staffing  Authorizing Provider: Laron Kellogg MD  Performing Provider: Laron Kellogg MD    Staffing  Performed by: Laron Kellogg MD  Authorized by: Laron Kellogg MD    Preanesthetic Checklist  Completed: patient identified, IV checked, site marked, risks and benefits discussed, surgical consent, monitors and equipment checked, pre-op evaluation and timeout performed  Peripheral Block  Patient position: sitting  Prep: ChloraPrep and site prepped and draped  Patient monitoring: heart rate, cardiac monitor, continuous pulse ox, continuous capnometry and frequent blood pressure checks  Block type: interscalene  Laterality: right  Injection technique: continuous  Needle  Needle type: Tuohy   Needle gauge: 17 G  Needle length: 3.5 in  Needle localization: anatomical landmarks and ultrasound guidance  Catheter type: non-stimulating  Catheter size: 20 G  Test dose: lidocaine 1.5% with Epi 1-to-200,000 and negative   -ultrasound image captured on disc.  Assessment  Injection assessment: negative aspiration, negative parasthesia and local visualized surrounding nerve  Paresthesia pain: none  Heart rate change: no  Slow fractionated injection: yes  Pain Tolerance: comfortable throughout block and no complaints  Medications:    Medications: ropivacaine (NAROPIN) injection 0.5% - Perineural   10 mL - 11/12/2024 8:11:00 AM    Additional Notes  VSS.  [facility] RN monitoring vitals throughout procedure.  Patient tolerated procedure well.     Diluted 10 cc of 0.5% Ropivacaine to 20 cc of 0.25% Ropivacaine.  Added 1:300k epi.

## 2024-11-12 NOTE — PLAN OF CARE
Tolerating bedside procedure nerve block well. Dr Kellogg at bedside. Pre-op nursing complete. Call light remains in reach. Wife to bedside prior to nerve block. Spouse returned to waiting room with pt's belongings.

## 2024-11-12 NOTE — TRANSFER OF CARE
Anesthesia Transfer of Care Note    Patient: Santiago De Luna    Procedure(s) Performed: Procedure(s) (LRB):  REPAIR, ROTATOR CUFF (Right)  DEBRIDEMENT, SHOULDER, ARTHROSCOPIC (Right)    Patient location: PACU    Anesthesia Type: general    Transport from OR: Transported from OR on 6-10 L/min O2 by face mask with adequate spontaneous ventilation    Post pain: adequate analgesia    Post assessment: no apparent anesthetic complications and tolerated procedure well    Post vital signs: stable    Level of consciousness: awake, alert and oriented    Nausea/Vomiting: no nausea/vomiting    Complications: none    Transfer of care protocol was followed    Last vitals: Visit Vitals  BP (!) 165/78 (BP Location: Left arm, Patient Position: Lying)   Pulse 65   Temp 36.5 °C (97.7 °F) (Oral)   Resp 20   Ht 6' (1.829 m)   Wt 88.9 kg (196 lb)   SpO2 98%   BMI 26.58 kg/m²

## 2024-11-12 NOTE — ANESTHESIA PREPROCEDURE EVALUATION
11/12/2024  Pre-operative evaluation for Procedure(s) (LRB):  REPAIR, ROTATOR CUFF (Right)  ARTHROSCOPY,SHOULDER,WITH BICEPS TENODESIS (Right)  DEBRIDEMENT, SHOULDER, ARTHROSCOPIC (Right)  ARTHROSCOPY, SHOULDER, WITH DISTAL CLAVICLE EXCISION (Right)    Santiago De Luna is a 64 y.o. male     Patient Active Problem List   Diagnosis    Complete rotator cuff tear of left shoulder    Vasomotor rhinitis    Non-traumatic rotator cuff tear, right    S/P arthroscopy of right shoulder    Chronic right shoulder pain    Impingement of right shoulder    Acute pain of right shoulder    Traumatic complete tear of right rotator cuff    Anxiety    Benign prostatic hyperplasia with nocturia    Snoring    Marijuana use    Alcohol consumption of more than two drinks per day    Macrocytic anemia       Review of patient's allergies indicates:   Allergen Reactions    Indomethacin      Diarrhea and gas    Shellfish derived Itching, Rash and Swelling     Mussels,clams,shrimps- rash  Swelling of face      Hydrocodone Hives, Itching and Rash    Hydrocodone-acetaminophen Itching and Rash    Mupirocin Diarrhea, Nausea And Vomiting and Other (See Comments)       No current facility-administered medications on file prior to encounter.     Current Outpatient Medications on File Prior to Encounter   Medication Sig Dispense Refill    busPIRone (BUSPAR) 5 MG Tab TAKE 1 TABLET(5 MG) BY MOUTH THREE TIMES DAILY AS NEEDED FOR ANXIETY (Patient not taking: Reported on 11/6/2024) 90 tablet 3    celecoxib (CELEBREX) 200 MG capsule Take 1 capsule (200 mg total) by mouth 2 (two) times daily. (Patient not taking: Reported on 11/6/2024) 60 capsule 1    meclizine (ANTIVERT) 25 mg tablet Take 1 tablet (25 mg total) by mouth 3 (three) times daily as needed for Dizziness. (Patient not taking: Reported on 11/6/2024) 90 tablet 1    meloxicam (MOBIC) 15 MG tablet  Take 1 tablet (15 mg total) by mouth once daily. 30 tablet 2    tadalafiL (CIALIS) 5 MG tablet Take 1 tablet (5 mg total) by mouth daily as needed for Erectile Dysfunction. (Patient not taking: Reported on 11/6/2024) 20 tablet 11    tamsulosin (FLOMAX) 0.4 mg Cap Take 1 capsule (0.4 mg total) by mouth once daily. 30 capsule 11       Past Surgical History:   Procedure Laterality Date    ANTERIOR CRUCIATE LIGAMENT REPAIR Bilateral     HERNIA REPAIR  09/2017    ROTATOR CUFF REPAIR Bilateral        Social History     Socioeconomic History    Marital status:    Tobacco Use    Smoking status: Never    Smokeless tobacco: Never   Substance and Sexual Activity    Alcohol use: Yes     Alcohol/week: 28.0 standard drinks of alcohol     Types: 14 Cans of beer, 14 Shots of liquor per week    Drug use: Yes     Frequency: 5.0 times per week     Types: Marijuana    Sexual activity: Yes     Partners: Female     Birth control/protection: None     Social Drivers of Health     Financial Resource Strain: Low Risk  (8/13/2024)    Overall Financial Resource Strain (CARDIA)     Difficulty of Paying Living Expenses: Not hard at all   Food Insecurity: No Food Insecurity (8/13/2024)    Hunger Vital Sign     Worried About Running Out of Food in the Last Year: Never true     Ran Out of Food in the Last Year: Never true   Physical Activity: Sufficiently Active (8/13/2024)    Exercise Vital Sign     Days of Exercise per Week: 5 days     Minutes of Exercise per Session: 40 min   Stress: No Stress Concern Present (8/13/2024)    [employer] - Occupational Stress Questionnaire     Feeling of Stress : Not at all   Housing Stability: Unknown (8/13/2024)    Housing Stability Vital Sign     Unable to Pay for Housing in the Last Year: No       EKG:  None on file    2D Echo:  No results found for this or any previous visit.        Pre-op Assessment    I have reviewed the Patient Summary Reports.     I have reviewed the  Nursing Notes. I have reviewed the NPO Status.   I have reviewed the Medications.     Review of Systems  Anesthesia Hx:             Denies Family Hx of Anesthesia complications.    Denies Personal Hx of Anesthesia complications.                    Cardiovascular:  Exercise tolerance: good        Denies Dysrhythmias.   Denies Angina.       Denies [place].                              Pulmonary:    Denies COPD.  Denies Asthma.                    Renal/:   Denies Chronic Renal Disease.                Hepatic/GI:      Denies GERD.                Neurological:    Denies CVA.    Denies Seizures.                                Endocrine:  Denies Diabetes.           Psych:   anxiety                 Physical Exam  General: Well nourished, Cooperative, Alert and Oriented    Airway:  Mallampati: III / II  Mouth Opening: Normal  TM Distance: Normal  Tongue: Normal  Neck ROM: Normal ROM    Dental:  Intact    Chest/Lungs:  Clear to auscultation, Normal Respiratory Rate    Heart:  Rate: Normal  Rhythm: Regular Rhythm        Anesthesia Plan  Type of Anesthesia, risks & benefits discussed:    Anesthesia Type: Gen ETT, Regional  Intra-op Monitoring Plan: Standard ASA Monitors  Post Op Pain Control Plan: multimodal analgesia, IV/PO Opioids PRN and peripheral nerve block  Induction:  IV  Airway Plan: Direct, Post-Induction  Informed Consent: Informed consent signed with the Patient and all parties understand the risks and agree with anesthesia plan.  All questions answered.   ASA Score: 2  Day of Surgery Review of History & Physical: H&P Update referred to the surgeon/provider.    Ready For Surgery From Anesthesia Perspective.     .

## 2024-11-13 NOTE — ANESTHESIA POST-OP PAIN MANAGEMENT
"Acute Pain Service Progress Note    11/13/2024 1314    Patient contacted regarding CADD infusion follow up. Reports that pain has been well controlled with the infusion pump. Denies signs of local anesthetic toxicity. PNC dressing remains clean, dry, and intact. All questions answered. Reminded patient that the infusion should be discontinued and PNC removed whenever the "infusion complete" alert is seen on the display screen or by POD 5 (11/17/24) at 12pm, whichever comes first. Encouraged patient to call the CADD 24/7 support line at [phone] or the [facility] line at [phone] for any CADD related questions/issues. Verbalizes understanding.            "

## 2024-11-18 ENCOUNTER — CLINICAL SUPPORT (OUTPATIENT)
Dept: REHABILITATION | Facility: OTHER | Age: 64
End: 2024-11-18
Attending: ORTHOPAEDIC SURGERY
Payer: COMMERCIAL

## 2024-11-18 DIAGNOSIS — G89.18 ACUTE POSTOPERATIVE PAIN OF RIGHT SHOULDER: ICD-10-CM

## 2024-11-18 DIAGNOSIS — M25.511 ACUTE POSTOPERATIVE PAIN OF RIGHT SHOULDER: ICD-10-CM

## 2024-11-18 DIAGNOSIS — Z98.890 POST-OPERATIVE STATE: Primary | ICD-10-CM

## 2024-11-18 PROCEDURE — 97530 THERAPEUTIC ACTIVITIES: CPT | Mod: PN | Performed by: PHYSICAL THERAPIST

## 2024-11-18 PROCEDURE — 97162 PT EVAL MOD COMPLEX 30 MIN: CPT | Mod: PN | Performed by: PHYSICAL THERAPIST

## 2024-11-18 NOTE — PLAN OF CARE
"[facility]   Physical Therapy Initial Evaluation      Name: Santiago De Luna  [facility] Number: [id number]    Therapy Diagnosis:   Encounter Diagnoses   Name Primary?    Post-operative state Yes    Acute postoperative pain of right shoulder         Physician: Aura Schwarz MD    Physician Orders: PT Eval and Treat:      Massive size Rotator cuff protocol  Biceps tenodesis was not performed; if tenodesis was performed limit aggressive biceps flexion exercises for first 6 weeks     Start Therapy in 3-5 days      Use Sling and Abduction Pillow - For  6 weeks then discontinue pillow at 6 weeks time to protect rotator cuff repair and biceps tenodesis. Discontinue all immobilization in 6 weeks. Remove arm from sling immediately and move elbow and wrist as tolerated.     Cuff specific program:  Pendulum exercises and Codman's exercises in 3-5 days  protecting rotator cuff repair for 6 weeks by avoiding active motion program until 6 weeks.     PASSIVE ROM: "ER side 30 degrees, Forward Flex 90 degrees, ABD - 60 degrees   Full AAROM/PROM starting at 6 weeks as tolerated   Medical Diagnosis from Referral: M75.101 (ICD-10-CM) - Rotator cuff syndrome, right S46.011A (ICD-10-CM) - Traumatic complete tear of right rotator cuff, initial encounter M25.811 (ICD-10-CM) - Impingement of right shoulder  Evaluation Date: 11/18/2024  Authorization Period Expiration: 12/31/2024  Plan of Care Expiration: 2/14/2025  Progress Note Due: 12/18/2024  Date of Surgery: 11/12/2024  Visit # / Visits authorized: 1/ 1   FOTO: 1/ 3    Precautions: Standard and post-op      Time In: 1025  Time Out: 1105  Total Billable Time: 10 minutes    Subjective     Date of onset: 11/12/2024    History of current condition - Santiago reports: doing ok after surgery. Using ice and TENS as needed. Pain has been very low, not needing pain medication much at all. Doing exercises from MD consistently as instructed (pendulums, shoulder shrugs, scap " squeezes, elbow flex/ext, pulley flexion, passive abduction).   Pt is R hand dominant    Falls: none    Imaging: MRI studies: FINDINGS:  Evidence of prior rotator cuff repair with 2 suture anchors in place.     ROTATOR CUFF:     Supraspinatus: There is irregularity at the level of the anchor tendon interface with abnormality of the tendinous component extending for 2.4 cm     Infraspinatus: Irregularity of the infraspinatus, high-grade, greater than 50%, extending for approximately 2.4 cm.  No tendinosis.     Subscapularis: Intact.  No tendinosis.     Teres Minor: Intact.  No tendinosis.     There is fluid within the subacromial/subdeltoid bursa consistent with bursitis predominantly subdeltoid level.     LABRUM: Circumferential labral fraying on this standard non arthrogram exam.  IGHL:Intact.     LONG HEAD BICEPS TENDON: Located within bicipital groove and intact.Biceps-labral anchor is intact. No tendinosis.  No tenosynovitis. Rotator Interval is normal. Biceps pulley is intact.     BONES: No evident fracture.Visualized marrow within normal limits. AC joint demonstrates normal alignment with moderate hypertrophy.No significant osteo-acromial outlet narrowing.  There is no evident os acromiale.     CARTILAGE: Humeral head and glenoid cartilage preserved without focal defects. No subchondral marrow edema.  No synovial abnormality or intra-articular loose bodies. Glenoid fossa demonstrates no sclerosis.     MUSCLES:  Normal bulk and signal.     Impression:     S/P rotator cuff repair with findings suspicious for high-grade partial, near complete, tear manifest by tendinous irregularity of the distal aspect supraspinatus tendon for a length of 2.4 cm with significant high-grade partial irregularity of the infraspinatus for 2.4 cm.  Accompanying subacromial subdeltoid bursitis.    Prior Therapy: yes,  Social History: Pt lives with their spouse  Occupation: works from home, on laptop  Prior Level of Function: I with  ADL's and driving, weightlifting and walking   Current Level of Function: mod I with most ADL's, but wife assisted with showering and some dressing. Not driving since surgery. Abduction sling except when doing HEP    Pain:  Current 1/10, worst 5/10, best 1/10   Location: R shoulder  Description: Aching  Aggravating Factors: accidental movement  Easing Factors: ice, TENS, medication     Patients goals: get back to weight lifting     Medical History:   Past Medical History:   Diagnosis Date    Anxiety disorder, unspecified     Male erectile dysfunction, unspecified        Surgical History:   Santiago De Luna  has a past surgical history that includes Hernia repair (09/2017); Anterior cruciate ligament repair (Bilateral); Rotator cuff repair (Bilateral); Rotator cuff repair (Right, 11/12/2024); and Arthroscopic debridement of shoulder (Right, 11/12/2024).    Medications:   Santiago has a current medication list which includes the following prescription(s): aspirin, celecoxib, methocarbamol, oxycodone, pregabalin, promethazine, and tamsulosin.    Allergies:   Review of patient's allergies indicates:   Allergen Reactions    Indomethacin      Diarrhea and gas    Shellfish derived Itching, Rash and Swelling     Mussels,clams,shrimps- rash  Swelling of face      Hydrocodone Hives, Itching and Rash    Hydrocodone-acetaminophen Itching and Rash    Mupirocin Diarrhea, Nausea And Vomiting and Other (See Comments)        Objective      Observation: Pt is alert and oriented, good historian. Abduction sling in place    Posture: mild upper crossed      Passive Range of Motion:   Shoulder Left Right   Flexion 170 85   Abduction 170 60   ER at 0  30   ER at 90 100    IR 60       Active Range of Motion:   Shoulder Left   Flexion 160   Abduction 160       Upper Extremity Strength   (L) UE   Shoulder flexion: 5/5   Shoulder Abduction: 5/5   Shoulder extension 5/5   Shoulder ER 5/5   Shoulder IR 5/5   Elbow flexion: 5/5   Elbow extension: 5/5      *R not assessed            Limitation/Restriction for FOTO Shoulder Survey    Therapist reviewed FOTO scores for Santiago De Luna on 11/18/2024.   FOTO documents entered into [employer] - see Media section.    Intake Score: 39%    Goal: 67%         Treatment     Total Treatment time (time-based codes) separate from Evaluation: 10 minutes      Santiago received the treatments listed below:      therapeutic activities to improve functional performance for 10  minutes, including:  Development, demonstration, and review of home exercise program to include:   Continue with HEP from MD   Add active wrist extension, flexion, radial deviation, pronation/supination          Patient Education and Home Exercises     Education provided:   - therapy rationale and plan of care, post-op protocol    Written Home Exercises Provided: yes. Exercises were reviewed and Santiago was able to demonstrate them prior to the end of the session.  Santiago demonstrated good  understanding of the education provided. See EMR under Patient Instructions for exercises provided during therapy sessions.    Assessment     Santiago is a 64 y.o. male referred to outpatient Physical Therapy with a medical diagnosis of M75.101 (ICD-10-CM) - Rotator cuff syndrome, right S46.011A (ICD-10-CM) - Traumatic complete tear of right rotator cuff, initial encounter M25.811 (ICD-10-CM) - Impingement of right shoulder. Patient presents with s/s consistent with recent procedure. PROM within limits as expected with post-op protocol. Pt returns excellent understanding of HEP provided by MD after surgery including pendulums, shoulder shrugs, scap squeezes and PROM with pulley. Pt educated on limitations recommended by protocol, and provided with additional exercises to include wrist strengthening.     Patient prognosis is Good.   Patient will benefit from skilled outpatient Physical Therapy to address the deficits stated above and in the chart below, provide patient /family education, and  to maximize patientt's level of independence.     Plan of care discussed with patient: Yes  Patient's spiritual, cultural and educational needs considered and patient is agreeable to the plan of care and goals as stated below:     Anticipated Barriers for therapy: standard, transportation    Medical Necessity is demonstrated by the following  History  Co-morbidities and personal factors that may impact the plan of care [] LOW: no personal factors / co-morbidities  [x] MODERATE: 1-2 personal factors / co-morbidities  [] HIGH: 3+ personal factors / co-morbidities     Moderate / High Support Documentation:   Co-morbidities affecting plan of care: prior RTC repair to B shoulders, anxiety, ACL repair     Personal Factors:   no deficits      Examination  Body Structures and Functions, activity limitations and participation restrictions that may impact the plan of care [] LOW: addressing 1-2 elements  [] MODERATE: 3+ elements  [x] HIGH: 4+ elements (please support below)     Moderate / High Support Documentation: reaching, lifting, carrying, bathing, dressing, driving, recreation      Clinical Presentation [] LOW: stable  [x] MODERATE: Evolving  [] HIGH: Unstable      Decision Making/ Complexity Score: moderate           Goals:  Short Term Goals (4 Weeks):   1. Pt to demonstrate improved PROM by 10% to allow pt to perform self care activities with increased ease.  2. Pt to demonstrate improved flexibility by a half grade to allow improved ADLs with increased ease.   3. Pt will report <2/10 pain within the R shoulder for ease with donning/doffing sling.  4. Pt will report being independent with his/her HEP for maintenance of improvements gained during therapy sessions  5. Pt to demonstrate improved functional ability with FOTO score >=48% .    Long Term Goals (12 Weeks):   1. Pt to demonstrate improved ROM to WNL, R=L, to allow pt to perform self care with increased ease.  2. Pt to demonstrate improved flexibility by a full  grade to allow improved postural alignment with increased ease.  3. Pt will demonstrate >=4+/5 strength within the R shoulder for ease with house hold chores  4. Pt to demonstrate improved functional ability with FOTO score >=67% .  5. Pt independent with HEP and demonstrates good return technique.    Plan     Plan of care Certification: 11/18/2024 to 2/14/2025.    Outpatient Physical Therapy 2 times weekly for 12 weeks to include the following interventions: Electrical Stimulation PRN, Manual Therapy, Moist Heat/ Ice, Neuromuscular Re-ed, Patient Education, Therapeutic Activities, and Therapeutic Exercise.   Pt will initiate care at 1x/week, will plan to increase to 2x/week at 6 weeks post-op. Planned absence late December to early January for travel.     Felicita Anderosn, PT       Physician's Signature: _________________________________________ Date: ________________

## 2024-11-25 ENCOUNTER — OFFICE VISIT (OUTPATIENT)
Dept: SPORTS MEDICINE | Facility: CLINIC | Age: 64
End: 2024-11-25
Payer: COMMERCIAL

## 2024-11-25 VITALS
BODY MASS INDEX: 27.84 KG/M2 | SYSTOLIC BLOOD PRESSURE: 128 MMHG | DIASTOLIC BLOOD PRESSURE: 85 MMHG | WEIGHT: 205.56 LBS | HEART RATE: 63 BPM | HEIGHT: 72 IN

## 2024-11-25 DIAGNOSIS — Z98.890 S/P ARTHROSCOPY OF RIGHT SHOULDER: Primary | ICD-10-CM

## 2024-11-25 PROCEDURE — 99999 PR PBB SHADOW E&M-EST. PATIENT-LVL III: CPT | Mod: PBBFAC,,, | Performed by: PHYSICIAN ASSISTANT

## 2024-11-25 PROCEDURE — 99024 POSTOP FOLLOW-UP VISIT: CPT | Mod: S$GLB,,, | Performed by: PHYSICIAN ASSISTANT

## 2024-11-25 NOTE — PROGRESS NOTES
Subjective:     Chief Complaint: Santiago De Luna is a 64 y.o. male who had concerns including Post-op Evaluation of the Right Shoulder.    Patient presents to clinic for 2 week post op evaluation of right knee. Pain today is 0/10. Denies nausea, vomiting, fever, chills, CP, and SOB. Evinhas been attending formal PT at [facility]. He is no longer taking narcotics. He has been taking ASA 325mg once daily for DVT prophylaxis. Doing well. Sling in place.    Date of Procedure: 11/12/2024      Procedure: Complex  1. Right shoulder Arthroscopic massive complex rotator cuff repair CPT - 52569     2. Right shoulder Arthroscopic Extensive Debridement, 02762     3. Right shoulder Arthroscopic labral debridement CPT - 15809        Complexity of case was increased based on the 5 cm size of the tear noted in both supraspinatus and infraspinatus tissue(s) with retraction of the tissue to the glenoid level and significant scare with in the region of concern.     Surgeons and Role:     * Aura Schwarz MD - Primary     Assisting Surgeon:      MD Nasima Fry SMA-Assistant     It was medically necessary for Ana Martel MD, PA-C to perform first assistant duties aiding in setup, exposure and closure.     Pre-Operative Diagnosis: Traumatic complete tear of right rotator cuff, initial encounter [S46.011A]     Post-Operative Diagnosis: Post-Op Diagnosis Codes:     * Traumatic complete tear of right rotator cuff, initial encounter [S46.011A]     Anesthesia: General     Operative Findings (including complications, if any): complex atrophic rotator cuff tear with retraction and failed previous repair, intra-articular adhesions          Review of Systems   Constitutional: Negative. Negative for chills, fever, weight gain and weight loss.   HENT:  Negative for congestion and sore throat.    Eyes:  Negative for blurred vision and double vision.   Cardiovascular:  Negative for chest pain, leg swelling and  palpitations.   Respiratory:  Negative for cough and shortness of breath.    Hematologic/Lymphatic: Does not bruise/bleed easily.   Skin:  Negative for itching, poor wound healing and rash.   Musculoskeletal:  Positive for joint pain. Negative for back pain, joint swelling, muscle weakness, myalgias and stiffness.   Gastrointestinal:  Negative for abdominal pain, constipation, diarrhea, nausea and vomiting.   Genitourinary: Negative.  Negative for frequency and hematuria.   Neurological:  Negative for dizziness, headaches, numbness, paresthesias and sensory change.   Psychiatric/Behavioral:  Negative for altered mental status and depression. The patient is not nervous/anxious.    Allergic/Immunologic: Negative for hives.       Pain Related Questions  Over the past 3 days, what was your average pain during activity? (I.e. running, jogging, walking, climbing stairs, getting dressed, ect.): 2  Over the past 3 days, what was your highest pain level?: 2  Over the past 3 days, what was your lowest pain level? : 0    Other  How many nights a week are you awakened by your affected body part?: 0  Was the patient's HEIGHT measured or patient reported?: Patient Reported  Was the patient's WEIGHT measured or patient reported?: Measured    Objective:     General: Santiago is well-developed, well-nourished, appears stated age, in no acute distress, alert and oriented to time, place and person.     General    Vitals reviewed.  Constitutional: He is oriented to person, place, and time. He appears well-developed and well-nourished. No distress.   Cardiovascular:  Normal rate and regular rhythm.            Pulmonary/Chest: Effort normal. No respiratory distress.   Neurological: He is alert and oriented to person, place, and time.   Psychiatric: He has a normal mood and affect. His behavior is normal. Thought content normal.         Right Shoulder Exam     Inspection/Observation   Swelling: present  Bruising: absent  Scars:  "present  Deformity: absent  Scapular Winging: absent  Scapular Dyskinesia: negative  Atrophy: absent    Tenderness   The patient is experiencing no tenderness.    Range of Motion   Passive abduction:  60 normal   Forward Elevation: 60 (passive) normal  External Rotation 0 degrees:  20 (passive) normal     Other   Sensation: normal    Comments:  Portal sutures removed. No signs of infection or necrosis. No purulent drainage. NVI.        Assessment:     Encounter Diagnosis   Name Primary?    S/P arthroscopy of right shoulder Yes        Plan:     1. Reviewed operative note with patient.  2. Removed portal sutures. No signs of infection.  3. May shower now without covering incisions.  4. Continue  mg once a day. Okay to wean off Lyrica and Robaxin as tolerated.   5. Continue PT per protocol.  6. RTC in 4 weeks with Dr. Aura Schwarz for 6 week post op appt.  7.Physical therapy:      Massive size Rotator cuff protocol  Biceps tenodesis was not performed; if tenodesis was performed limit aggressive biceps flexion exercises for first 6 weeks     Start Therapy in 3-5 days      Use Sling and Abduction Pillow - For  6 weeks then discontinue pillow at 6 weeks time to protect rotator cuff repair and biceps tenodesis. Discontinue all immobilization in 6 weeks. Remove arm from sling immediately and move elbow and wrist as tolerated.     Cuff specific program:  Pendulum exercises and Codman's exercises in 3-5 days  protecting rotator cuff repair for 6 weeks by avoiding active motion program until 6 weeks.     PASSIVE ROM: "ER side 30 degrees, Forward Flex 90 degrees, ABD - 60 degrees   Full AAROM/PROM starting at 6 weeks as tolerated       Patient questionnaires may have been collected.    "

## 2024-12-02 ENCOUNTER — CLINICAL SUPPORT (OUTPATIENT)
Dept: REHABILITATION | Facility: OTHER | Age: 64
End: 2024-12-02
Payer: COMMERCIAL

## 2024-12-02 DIAGNOSIS — M25.511 ACUTE POSTOPERATIVE PAIN OF RIGHT SHOULDER: ICD-10-CM

## 2024-12-02 DIAGNOSIS — Z98.890 POST-OPERATIVE STATE: Primary | ICD-10-CM

## 2024-12-02 DIAGNOSIS — G89.18 ACUTE POSTOPERATIVE PAIN OF RIGHT SHOULDER: ICD-10-CM

## 2024-12-02 PROCEDURE — 97140 MANUAL THERAPY 1/> REGIONS: CPT | Mod: PN

## 2024-12-02 PROCEDURE — 97530 THERAPEUTIC ACTIVITIES: CPT | Mod: PN

## 2024-12-10 ENCOUNTER — CLINICAL SUPPORT (OUTPATIENT)
Dept: REHABILITATION | Facility: OTHER | Age: 64
End: 2024-12-10
Attending: PHYSICAL THERAPIST
Payer: COMMERCIAL

## 2024-12-10 DIAGNOSIS — M25.511 ACUTE POSTOPERATIVE PAIN OF RIGHT SHOULDER: ICD-10-CM

## 2024-12-10 DIAGNOSIS — G89.18 ACUTE POSTOPERATIVE PAIN OF RIGHT SHOULDER: ICD-10-CM

## 2024-12-10 DIAGNOSIS — Z98.890 POST-OPERATIVE STATE: Primary | ICD-10-CM

## 2024-12-10 PROCEDURE — 97530 THERAPEUTIC ACTIVITIES: CPT | Mod: PN | Performed by: PHYSICAL THERAPIST

## 2024-12-10 PROCEDURE — 97140 MANUAL THERAPY 1/> REGIONS: CPT | Mod: PN | Performed by: PHYSICAL THERAPIST

## 2024-12-10 NOTE — PROGRESS NOTES
"[facility]   Physical Therapy Treatment Note      Name: Santiago De Luna  [facility] Number: [id number]    Therapy Diagnosis:   Encounter Diagnoses   Name Primary?    Post-operative state Yes    Acute postoperative pain of right shoulder      Physician: Rivera Fernandes, *    Visit Date: 12/10/2024    Physician Orders: PT Eval and Treat:      Massive size Rotator cuff protocol  Biceps tenodesis was not performed; if tenodesis was performed limit aggressive biceps flexion exercises for first 6 weeks     Start Therapy in 3-5 days      Use Sling and Abduction Pillow - For  6 weeks then discontinue pillow at 6 weeks time to protect rotator cuff repair and biceps tenodesis. Discontinue all immobilization in 6 weeks. Remove arm from sling immediately and move elbow and wrist as tolerated.     Cuff specific program:  Pendulum exercises and Codman's exercises in 3-5 days  protecting rotator cuff repair for 6 weeks by avoiding active motion program until 6 weeks.     PASSIVE ROM: "ER side 30 degrees, Forward Flex 90 degrees, ABD - 60 degrees   Full AAROM/PROM starting at 6 weeks as tolerated   Medical Diagnosis from Referral: M75.101 (ICD-10-CM) - Rotator cuff syndrome, right S46.011A (ICD-10-CM) - Traumatic complete tear of right rotator cuff, initial encounter M25.811 (ICD-10-CM) - Impingement of right shoulder  Evaluation Date: 11/18/2024  Authorization Period Expiration: 12/31/2024  Plan of Care Expiration: 2/14/2025  Progress Note Due: 12/18/2024  Date of Surgery: 11/12/2024  Visit # / Visits authorized: 3/ 21   FOTO: 1/ 3     Precautions: Standard and post-op       Time In: 0920  Time Out: 0950  Total Billable Time: 30 minutes    PTA Visit #: 0/5       Subjective     Patient reports: he's doing great, no pain. He's down to just taking the celebrex. He's sleeping well, no disruption due to shoulder    F/U with surgeon on 12/18/2024.    He was compliant with home exercise program.  Response to " previous treatment: good  Functional change: reduced pain, improved mm flexibility and decreased mm tone/pain surrounding R shoulder    Pain: <1/10  Location: R shoulder    Objective      Objective Measures updated at progress report unless specified.       Treatment     Santiago received the treatments listed below:      therapeutic exercises to develop strength, endurance, ROM, flexibility, posture, and core stabilization for 00 minutes including:  None today    manual therapy techniques: Joint mobilizations, Myofacial release, and Soft tissue Mobilization were applied to the: R shoulder for 10 minutes, including:  STM/MFR pecs, deltoids, posterior scap mm's (infra and supraspinatus, UT/LS)  GHJ glides all directions Gr I-III, GHJ distraction GrI-III with gentle oscillations  Gentle PROM of shoulder IR, ER, flex, abd in scap plane within tolerable and available range as indicated per MD protocol    neuromuscular re-education activities to improve: Balance, Coordination, Kinesthetic, Sense, Proprioception, and Posture for 00 minutes. The following activities were included:  None today    therapeutic activities to improve functional performance for 20 minutes, including:  +Wrist x 1#   Flexion   Extension   Radial deviation   Pronation/supination  + with power web 20x  +Finger extension with red digi ball 2 x 10  Reviewed HEP given during previous visits for understanding and technique.        Patient Education and Home Exercises       Education provided:   - none today    Written Home Exercises Provided: Pt instructed to continue prior HEP. Exercises were reviewed and Santiago was able to demonstrate them prior to the end of the session.  Santiago demonstrated good  understanding of the education provided. See Electronic Medical Record under Patient Instructions for exercises provided during therapy sessions    Assessment     Pt is doing very well. ROM is within expected range for post-op protocol. Mild c/o fatigue to  forearm with addition of  extension exercise, but tolerable. Discussion of therapy plan and protocol in conjunction with upcoming travel.     F/U with surgeon on 12/18/2024.    Santiago Is progressing well towards his goals.   Patient prognosis is Good.     Patient will continue to benefit from skilled outpatient physical therapy to address the deficits listed in the problem list box on initial evaluation, provide pt/family education and to maximize pt's level of independence in the home and community environment.     Patient's spiritual, cultural and educational needs considered and pt agreeable to plan of care and goals.     Anticipated barriers to physical therapy: standard, transportation    Goals: updated 12/10/2024   Short Term Goals (4 Weeks):   1. Pt to demonstrate improved PROM by 10% to allow pt to perform self care activities with increased ease. (Not met, progressing)  2. Pt to demonstrate improved flexibility by a half grade to allow improved ADLs with increased ease.  (Not met, progressing)  3. Pt will report <2/10 pain within the R shoulder for ease with donning/doffing sling. (Not met, progressing)  4. Pt will report being independent with his/her HEP for maintenance of improvements gained during therapy sessions (Not met, progressing)  5. Pt to demonstrate improved functional ability with FOTO score >=48% . (Not met, progressing)     Long Term Goals (12 Weeks):   1. Pt to demonstrate improved ROM to WNL, R=L, to allow pt to perform self care with increased ease. (Not met, progressing)  2. Pt to demonstrate improved flexibility by a full grade to allow improved postural alignment with increased ease. (Not met, progressing)  3. Pt will demonstrate >=4+/5 strength within the R shoulder for ease with house hold chores (Not met, progressing)  4. Pt to demonstrate improved functional ability with FOTO score >=67% . (Not met, progressing)  5. Pt independent with HEP and demonstrates good return technique.  (Not met, progressing)  Plan     Cont w/ PT POC per MD protocol for jt mobility, ROM, scap activation.  F/U with surgeon on 12/18/2024.    Felicita Anderson, PT, DPT

## 2024-12-16 ENCOUNTER — CLINICAL SUPPORT (OUTPATIENT)
Dept: REHABILITATION | Facility: OTHER | Age: 64
End: 2024-12-16
Payer: COMMERCIAL

## 2024-12-16 DIAGNOSIS — G89.18 ACUTE POSTOPERATIVE PAIN OF RIGHT SHOULDER: ICD-10-CM

## 2024-12-16 DIAGNOSIS — M25.511 ACUTE POSTOPERATIVE PAIN OF RIGHT SHOULDER: ICD-10-CM

## 2024-12-16 DIAGNOSIS — Z98.890 POST-OPERATIVE STATE: Primary | ICD-10-CM

## 2024-12-16 PROCEDURE — 97140 MANUAL THERAPY 1/> REGIONS: CPT | Mod: PN | Performed by: PHYSICAL THERAPIST

## 2024-12-16 PROCEDURE — 97530 THERAPEUTIC ACTIVITIES: CPT | Mod: PN | Performed by: PHYSICAL THERAPIST

## 2024-12-16 NOTE — PROGRESS NOTES
"[facility]   Physical Therapy Treatment Note      Name: Santiago De Luna  [facility] Number: [id number]    Therapy Diagnosis:   Encounter Diagnoses   Name Primary?    Post-operative state Yes    Acute postoperative pain of right shoulder      Physician: Rivera Fernandes, *    Visit Date: 12/16/2024    Physician Orders: PT Eval and Treat:      Massive size Rotator cuff protocol  Biceps tenodesis was not performed; if tenodesis was performed limit aggressive biceps flexion exercises for first 6 weeks     Start Therapy in 3-5 days      Use Sling and Abduction Pillow - For  6 weeks then discontinue pillow at 6 weeks time to protect rotator cuff repair and biceps tenodesis. Discontinue all immobilization in 6 weeks. Remove arm from sling immediately and move elbow and wrist as tolerated.     Cuff specific program:  Pendulum exercises and Codman's exercises in 3-5 days  protecting rotator cuff repair for 6 weeks by avoiding active motion program until 6 weeks.     PASSIVE ROM: "ER side 30 degrees, Forward Flex 90 degrees, ABD - 60 degrees   Full AAROM/PROM starting at 6 weeks as tolerated   Medical Diagnosis from Referral: M75.101 (ICD-10-CM) - Rotator cuff syndrome, right S46.011A (ICD-10-CM) - Traumatic complete tear of right rotator cuff, initial encounter M25.811 (ICD-10-CM) - Impingement of right shoulder  Evaluation Date: 11/18/2024  Authorization Period Expiration: 12/31/2024  Plan of Care Expiration: 2/14/2025  Progress Note Due: 12/18/2024  Date of Surgery: 11/12/2024  Visit # / Visits authorized: 4/ 21   FOTO: 1/ 3     Precautions: Standard and post-op       Time In: 1520  Time Out: 1550  Total Billable Time: 30 minutes    PTA Visit #: 0/5       Subjective     Patient reports: doing great, no pain.     F/U with surgeon on 12/18/2024.    He was compliant with home exercise program.  Response to previous treatment: good  Functional change: reduced pain, improved mm flexibility and decreased " mm tone/pain surrounding R shoulder    Pain: <1/10  Location: R shoulder    Objective      Objective Measures updated at progress report unless specified.       Treatment     Santiago received the treatments listed below:      therapeutic exercises to develop strength, endurance, ROM, flexibility, posture, and core stabilization for 03 minutes including:  Table slides flexion to 90 deg x 3 min    manual therapy techniques: Joint mobilizations, Myofacial release, and Soft tissue Mobilization were applied to the: R shoulder for 10 minutes, including:  STM/MFR pecs, deltoids, posterior scap mm's (infra and supraspinatus, UT/LS)  GHJ glides all directions Gr I-III, GHJ distraction GrI-III with gentle oscillations  Gentle PROM of shoulder  ER, flex, abd in scap plane within tolerable and available range as indicated per MD protocol    neuromuscular re-education activities to improve: Balance, Coordination, Kinesthetic, Sense, Proprioception, and Posture for 00 minutes. The following activities were included:  None today    therapeutic activities to improve functional performance for 18 minutes, including:  Wrist x 1# x 30 ea   Flexion   Extension   Radial deviation   Pronation/supination   with power web 20x  Finger extension with red digi ball 2 x 10  Reviewed HEP given during previous visits for understanding and technique.        Patient Education and Home Exercises       Education provided:   - none today    Written Home Exercises Provided: Pt instructed to continue prior HEP. Exercises were reviewed and Santiago was able to demonstrate them prior to the end of the session.  Santiago demonstrated good  understanding of the education provided. See Electronic Medical Record under Patient Instructions for exercises provided during therapy sessions    Assessment     Good tolerance to treatment. Pt easily achieving 90 deg passive elevation. Pt has 1 visit remaining before leaving for 3 weeks of travel, HEP will be provide to  include initiation of progression of protocol.     F/U with surgeon on 12/18/2024.    Santiago Is progressing well towards his goals.   Patient prognosis is Good.     Patient will continue to benefit from skilled outpatient physical therapy to address the deficits listed in the problem list box on initial evaluation, provide pt/family education and to maximize pt's level of independence in the home and community environment.     Patient's spiritual, cultural and educational needs considered and pt agreeable to plan of care and goals.     Anticipated barriers to physical therapy: standard, transportation    Goals: updated 12/16/2024   Short Term Goals (4 Weeks):   1. Pt to demonstrate improved PROM by 10% to allow pt to perform self care activities with increased ease. (Not met, progressing)  2. Pt to demonstrate improved flexibility by a half grade to allow improved ADLs with increased ease.  (Not met, progressing)  3. Pt will report <2/10 pain within the R shoulder for ease with donning/doffing sling. (Not met, progressing)  4. Pt will report being independent with his/her HEP for maintenance of improvements gained during therapy sessions (Not met, progressing)  5. Pt to demonstrate improved functional ability with FOTO score >=48% . (Not met, progressing)     Long Term Goals (12 Weeks):   1. Pt to demonstrate improved ROM to WNL, R=L, to allow pt to perform self care with increased ease. (Not met, progressing)  2. Pt to demonstrate improved flexibility by a full grade to allow improved postural alignment with increased ease. (Not met, progressing)  3. Pt will demonstrate >=4+/5 strength within the R shoulder for ease with house hold chores (Not met, progressing)  4. Pt to demonstrate improved functional ability with FOTO score >=67% . (Not met, progressing)  5. Pt independent with HEP and demonstrates good return technique. (Not met, progressing)  Plan     Cont w/ PT POC per MD protocol for jt mobility, ROM, scap  activation.  F/U with surgeon on 12/18/2024.    Felicita Anderson, PT, DPT

## 2024-12-18 ENCOUNTER — OFFICE VISIT (OUTPATIENT)
Dept: SPORTS MEDICINE | Facility: CLINIC | Age: 64
End: 2024-12-18
Payer: COMMERCIAL

## 2024-12-18 VITALS
SYSTOLIC BLOOD PRESSURE: 134 MMHG | DIASTOLIC BLOOD PRESSURE: 86 MMHG | BODY MASS INDEX: 28.33 KG/M2 | WEIGHT: 209.19 LBS | HEART RATE: 60 BPM | HEIGHT: 72 IN

## 2024-12-18 DIAGNOSIS — G89.29 CHRONIC RIGHT SHOULDER PAIN: ICD-10-CM

## 2024-12-18 DIAGNOSIS — M75.101 NON-TRAUMATIC ROTATOR CUFF TEAR, RIGHT: ICD-10-CM

## 2024-12-18 DIAGNOSIS — M25.511 CHRONIC RIGHT SHOULDER PAIN: ICD-10-CM

## 2024-12-18 DIAGNOSIS — Z98.890 S/P ARTHROSCOPY OF RIGHT SHOULDER: Primary | ICD-10-CM

## 2024-12-18 DIAGNOSIS — M25.811 IMPINGEMENT OF RIGHT SHOULDER: ICD-10-CM

## 2024-12-18 PROCEDURE — 99024 POSTOP FOLLOW-UP VISIT: CPT | Mod: S$GLB,,, | Performed by: ORTHOPAEDIC SURGERY

## 2024-12-18 PROCEDURE — 99999 PR PBB SHADOW E&M-EST. PATIENT-LVL III: CPT | Mod: PBBFAC,,, | Performed by: ORTHOPAEDIC SURGERY

## 2024-12-18 NOTE — PROGRESS NOTES
Subjective:     Chief Complaint: Santiago De Luna is a 64 y.o. male who had concerns including Post-op Evaluation of the Right Shoulder.    History of Present Illness    CHIEF COMPLAINT:  - Santiago presents today for a post-operative follow-up visit after shoulder surgery.    HPI:  Santiago presents for a follow-up visit after a shoulder surgical procedure. He reports that therapy has been progressing very well and he has been performing his exercises daily. He has discontinued all medications except Celebrex and aspirin. He reports no pain whatsoever. Santiago only took narcotics for the first week post-surgery as a precautionary measure, as he did not experience significant pain. This is his third surgery of this kind. His first surgery was performed before nerve blocks were used, which made recovery more challenging. Santiago denies stiffness. He does not require refills on any medications except Flowmax, which the orthopedic surgeon cannot prescribe. He mentions seeing another provider, Zeynep, a few weeks ago who allowed him to discontinue most of his medications.    Santiago denies experiencing pain or stiffness.    SURGICAL HISTORY:  - Shoulder surgery: Most recent, with nerve block  - Shoulder surgery: Second, with nerve block  - Shoulder surgery: First, without nerve block       PREOPERATIVE DIAGNOSIS:  Right shoulder    Rotator Cuff Syndrome/Disorder  M75.100 and Tear, Rotator Cuff, Non-traumatic M75.100     POSTOPERATIVE DIAGNOSIS: Right shoulder   Loose Body M24.012 Rotator Cuff Syndrome/Disorder  M75.100 and Tear, Rotator Cuff, Non-traumatic M75.100              PROCEDURES PERFORMED:  1. Right shoulder Arthroscopic massive complex rotator cuff repair CPT - 69974     2. Right shoulder  Arthroscopic Extensive Debridement, 57875     3. Right shoulder Arthroscopic labral debridement CPT - 43334              Objective:     General: Santiago is well-developed, well-nourished, appears stated age, in no acute distress, alert and  oriented to time, place and person.     General    Vitals reviewed.  Constitutional: He is oriented to person, place, and time. He appears well-developed and well-nourished. No distress.   HENT:   Nose: Nose normal. Mouth/Throat: No oropharyngeal exudate.   Eyes: Pupils are equal, round, and reactive to light. Right eye exhibits no discharge. Left eye exhibits no discharge.   Cardiovascular:  Normal rate and intact distal pulses.            Pulmonary/Chest: Effort normal and breath sounds normal. No respiratory distress.   Neurological: He is alert and oriented to person, place, and time. He has normal reflexes. He displays normal reflexes. No cranial nerve deficit. Coordination normal.   Psychiatric: He has a normal mood and affect. His behavior is normal. Judgment and thought content normal.         Right Shoulder Exam     Inspection/Observation   Swelling: absent  Bruising: absent  Scars: present  Deformity: absent  Scapular Winging: absent  Scapular Dyskinesia: negative  Atrophy: absent    Tenderness   The patient is tender to palpation of the greater tuberosity.    Range of Motion   Active abduction:  80 abnormal   Passive abduction:  90 abnormal   Extension:  0 abnormal   Forward Flexion:  100 abnormal   Forward Elevation: 100 abnormal  Adduction: 40 normal  External Rotation 0 degrees:  20 abnormal   External Rotation 90 degrees: 30 abnormal  Internal rotation 0 degrees:  Sacrum normal   Internal rotation 90 degrees:  30 normal     Tests & Signs   Apprehension: negative  Cross arm: negative  Drop arm: negative  Servin test: negative  Impingement: negative  Sulcus: absent  Rotator Cuff Painful Arc/Range: moderate  Anterosuperior Escape: negative  Lag Sign 0 degrees: negative  Lag Sign 90 degrees: negative  Lift Off Sign: negative  Belly Press: negative  Active Compression Test ([place]'s Sign): negative  Yergason's Test: negative  Speed's Test: negative  Anterior Drawer Test: 1+   Posterior Drawer Test:  1+  Relocation 90 degrees: negative  Relocation > 90 degrees: negative  Bear Hug: negative  Moving Valgus: negative  Jerk Test: negative    Other   Sensation: normal    Left Shoulder Exam     Inspection/Observation   Swelling: absent  Bruising: absent  Scars: present  Deformity: absent  Scapular Winging: absent  Scapular Dyskinesia: negative  Atrophy: absent    Tenderness   The patient is experiencing no tenderness.     Range of Motion   Active abduction:  90 normal   Passive abduction:  100 normal   Extension:  0 normal   Forward Flexion:  180 normal   Forward Elevation: 180 normal  Adduction: 40 normal  External Rotation 0 degrees:  50 normal   External Rotation 90 degrees: 90 normal  Internal rotation 0 degrees:  T8 normal   Internal rotation 90 degrees:  30 normal     Tests & Signs   Apprehension: negative  Cross arm: negative  Drop arm: negative  Servin test: negative  Impingement: negative  Sulcus: absent  Anterosuperior Escape: negative  Lag Sign 0 degrees: negative  Lag Sign 90 degrees: negative  Lift Off Sign: negative  Belly Press: negative  Active Compression test ([place]'s Sign): negative  Yergasons's Test: negative  Speed's Test: negative  Anterior Drawer Test: 1+  Posterior Drawer Test: 1+  Relocation 90 degrees: negative  Relocation > 90 degrees: negative  Bear Hug: negative  Moving Valgus: negative  Jerk Test: negative    Other   Sensation: normal       Muscle Strength   Right Upper Extremity   Shoulder Abduction: 5/5   Shoulder Internal Rotation: 5/5   Shoulder External Rotation: 5/5   Supraspinatus: 4/5   Subscapularis: 4/5   Biceps: 5/5   Left Upper Extremity  Shoulder Abduction: 5/5   Shoulder Internal Rotation: 5/5   Shoulder External Rotation: 5/5   Supraspinatus: 5/5   Subscapularis: 5/5   Biceps: 5/5     Reflexes     Left Side  Biceps:  2+  Triceps:  2+  Brachioradialis:  2+    Right Side   Biceps:  2+  Triceps:  2+  Brachioradialis:  2+    Vascular Exam     Right Pulses      Radial:                     2+      Left Pulses      Radial:                    2+      Capillary Refill  Right Hand: normal capillary refill  Left Hand: normal capillary refill          Radiographic findings today:      none  Xrays of the Right Shoulder were ordered and reviewed by me today. These findings were discussed and reviewed with the patient.      Assessment:     Encounter Diagnoses   Name Primary?    S/P arthroscopy of right shoulder Yes    Impingement of right shoulder     Chronic right shoulder pain     Non-traumatic rotator cuff tear, right         Plan:     Assessment & Plan    MEDICATIONS PRESCRIBED:  - Discontinued aspirin. Advised to take Celecoxib (Celebrex) as needed.    FOLLOW UP:  - Follow up in 6 weeks           This note was generated with the assistance of ambient listening technology. Verbal consent was obtained by the patient and accompanying visitor(s) for the recording of patient appointment to facilitate this note. I attest to having reviewed and edited the generated note for accuracy, though some syntax or spelling errors may persist. Please contact the author of this note for any clarification.

## 2024-12-19 ENCOUNTER — OFFICE VISIT (OUTPATIENT)
Dept: UROLOGY | Facility: CLINIC | Age: 64
End: 2024-12-19
Payer: COMMERCIAL

## 2024-12-19 ENCOUNTER — LAB VISIT (OUTPATIENT)
Dept: LAB | Facility: HOSPITAL | Age: 64
End: 2024-12-19
Payer: COMMERCIAL

## 2024-12-19 VITALS
WEIGHT: 204.69 LBS | SYSTOLIC BLOOD PRESSURE: 123 MMHG | BODY MASS INDEX: 27.73 KG/M2 | HEIGHT: 72 IN | HEART RATE: 56 BPM | DIASTOLIC BLOOD PRESSURE: 72 MMHG

## 2024-12-19 DIAGNOSIS — N13.8 BPH WITH OBSTRUCTION/LOWER URINARY TRACT SYMPTOMS: ICD-10-CM

## 2024-12-19 DIAGNOSIS — R35.1 BENIGN PROSTATIC HYPERPLASIA WITH NOCTURIA: ICD-10-CM

## 2024-12-19 DIAGNOSIS — N13.8 BPH WITH OBSTRUCTION/LOWER URINARY TRACT SYMPTOMS: Primary | ICD-10-CM

## 2024-12-19 DIAGNOSIS — N40.1 BPH WITH OBSTRUCTION/LOWER URINARY TRACT SYMPTOMS: Primary | ICD-10-CM

## 2024-12-19 DIAGNOSIS — N40.1 BENIGN PROSTATIC HYPERPLASIA WITH NOCTURIA: ICD-10-CM

## 2024-12-19 DIAGNOSIS — N40.1 BPH WITH OBSTRUCTION/LOWER URINARY TRACT SYMPTOMS: ICD-10-CM

## 2024-12-19 LAB
BILIRUB SERPL-MCNC: NORMAL MG/DL
BLOOD URINE, POC: NORMAL
CLARITY, POC UA: CLEAR
COLOR, POC UA: YELLOW
COMPLEXED PSA SERPL-MCNC: 1.6 NG/ML (ref 0–4)
GLUCOSE UR QL STRIP: NORMAL
KETONES UR QL STRIP: NORMAL
LEUKOCYTE ESTERASE URINE, POC: NORMAL
NITRITE, POC UA: NORMAL
PH, POC UA: 6
PROTEIN, POC: NORMAL
SPECIFIC GRAVITY, POC UA: 1.01
UROBILINOGEN, POC UA: 0.2

## 2024-12-19 PROCEDURE — 99999 PR PBB SHADOW E&M-EST. PATIENT-LVL III: CPT | Mod: PBBFAC,,,

## 2024-12-19 PROCEDURE — 36415 COLL VENOUS BLD VENIPUNCTURE: CPT

## 2024-12-19 PROCEDURE — 84153 ASSAY OF PSA TOTAL: CPT

## 2024-12-19 RX ORDER — TAMSULOSIN HYDROCHLORIDE 0.4 MG/1
0.4 CAPSULE ORAL DAILY
Qty: 90 CAPSULE | Refills: 3 | Status: SHIPPED | OUTPATIENT
Start: 2024-12-19 | End: 2025-12-19

## 2024-12-19 NOTE — PROGRESS NOTES
CHIEF COMPLAINT:  Frequent urination       HISTORY OF PRESENTING ILLINESS:  Santiago De Luna is a 64 y.o. male is an established pt with the Urology dept. He last saw ESTELITA Thomas 4/11/2023. He had been taking Flomax to help with his urination, has been out of the medication and has noticed an increase in the amount of times he had urinated lately.      Last PSA 2.0 on 3/29/23      REVIEW OF SYSTEMS:  Review of Systems   Constitutional:  Negative for chills and fever.   Respiratory:  Negative for cough and shortness of breath.    Gastrointestinal:  Negative for nausea and vomiting.   Genitourinary:  Positive for frequency. Negative for dysuria, hematuria and urgency.         PATIENT HISTORY:    Past Medical History:   Diagnosis Date    Anxiety disorder, unspecified     Male erectile dysfunction, unspecified        Past Surgical History:   Procedure Laterality Date    ANTERIOR CRUCIATE LIGAMENT REPAIR Bilateral     ARTHROSCOPIC DEBRIDEMENT OF SHOULDER Right 11/12/2024    Procedure: DEBRIDEMENT, SHOULDER, ARTHROSCOPIC;  Surgeon: Aura Schwarz MD;  Location: [facility] OR;  Service: Orthopedics;  Laterality: Right;    HERNIA REPAIR  09/2017    ROTATOR CUFF REPAIR Bilateral     ROTATOR CUFF REPAIR Right 11/12/2024    Procedure: REPAIR, ROTATOR CUFF;  Surgeon: Aura Schwarz MD;  Location: [facility] OR;  Service: Orthopedics;  Laterality: Right;  KWABENA 50cc       Family History   Problem Relation Name Age of Onset    Alcohol abuse Mother Debora Rendon Adsit     Early death Mother Debora Rendon Adskorey     Aortic aneurysm Mother Debora Rendon Adskorey     Stroke Father Mauricio HIGUERATaina De Luna     Early death Father Mauricio SIENA De Luna     Hearing loss Father Mauricio De Luna     Heart disease Father Mauricio De Luna     Prostate cancer Father Mauricio De Luna     Dementia Father Mauricio De Luna     Obesity Sister         Social History     Socioeconomic History    Marital status:    Tobacco Use    Smoking status: Never    Smokeless tobacco: Never   Substance and  Sexual Activity    Alcohol use: Yes     Alcohol/week: 28.0 standard drinks of alcohol     Types: 14 Cans of beer, 14 Shots of liquor per week    Drug use: Yes     Frequency: 5.0 times per week     Types: Marijuana     Comment: last use yesterday 1600    Sexual activity: Yes     Partners: Female     Birth control/protection: None     Social Drivers of Health     Financial Resource Strain: Low Risk  (8/13/2024)    Overall Financial Resource Strain (CARDIA)     Difficulty of Paying Living Expenses: Not hard at all   Food Insecurity: No Food Insecurity (8/13/2024)    Hunger Vital Sign     Worried About Running Out of Food in the Last Year: Never true     Ran Out of Food in the Last Year: Never true   Physical Activity: Sufficiently Active (8/13/2024)    Exercise Vital Sign     Days of Exercise per Week: 5 days     Minutes of Exercise per Session: 40 min   Stress: No Stress Concern Present (8/13/2024)    [place] [employer] - Occupational Stress Questionnaire     Feeling of Stress : Not at all   Housing Stability: Unknown (8/13/2024)    Housing Stability Vital Sign     Unable to Pay for Housing in the Last Year: No       Allergies:  Indomethacin, Shellfish derived, Hydrocodone, Hydrocodone-acetaminophen, and Mupirocin    Medications:    Current Outpatient Medications:     aspirin 325 MG tablet, Take 1 tablet at lunch daily starting after surgery for 6 weeks to prevent DVT., Disp: 42 tablet, Rfl: 0    celecoxib (CELEBREX) 200 MG capsule, Take 1 capsule (200 mg total) by mouth 2 (two) times daily with meals. (breakfast and dinner), Disp: 60 capsule, Rfl: 0    methocarbamoL (ROBAXIN) 500 MG Tab, Take 1 tablet (500 mg total) by mouth 3 (three) times daily as needed (muscle spasms)., Disp: 40 tablet, Rfl: 0    oxyCODONE (ROXICODONE) 5 MG immediate release tablet, Take 1-2 tablets every 6 hours as needed for pain. (Patient not taking: Reported on 11/25/2024), Disp: 28 tablet, Rfl: 0    pregabalin (LYRICA)  75 MG capsule, Take 1 capsule (75 mg total) by mouth 2 (two) times daily., Disp: 60 capsule, Rfl: 0    promethazine (PHENERGAN) 25 MG tablet, Take 1 tablet (25 mg total) by mouth every 4 (four) hours as needed for Nausea. (Patient not taking: Reported on 11/25/2024), Disp: 30 tablet, Rfl: 0    tamsulosin (FLOMAX) 0.4 mg Cap, Take 1 capsule (0.4 mg total) by mouth once daily., Disp: 30 capsule, Rfl: 11    PHYSICAL EXAMINATION:  Physical Exam  Constitutional:       Appearance: Normal appearance.   HENT:      Head: Normocephalic and atraumatic.   Pulmonary:      Effort: Pulmonary effort is normal. No respiratory distress.   Skin:     General: Skin is warm and dry.      Capillary Refill: Capillary refill takes less than 2 seconds.   Neurological:      General: No focal deficit present.      Mental Status: He is alert.   Psychiatric:         Mood and Affect: Mood normal.         Behavior: Behavior normal.           LABS:      In office UA today was normal. A bladder scan was done immediately after urination and showed 88 ml.       Lab Results   Component Value Date    PSA 2.0 03/29/2023    PSA 1.9 03/31/2022       Lab Results   Component Value Date    CREATININE 0.9 10/15/2024    EGFRNORACEVR >60.0 10/15/2024               IMPRESSION:    Encounter Diagnoses   Name Primary?    BPH with obstruction/lower urinary tract symptoms Yes    Benign prostatic hyperplasia with nocturia          Assessment:       1. BPH with obstruction/lower urinary tract symptoms    2. Benign prostatic hyperplasia with nocturia        Plan:       -PSA today   -Refilled Flomax 0.4 mg daily   -Follow up in 1 year    I spent a total of 30 minutes on the day of the visit. This includes face to face time and non-face to face time preparing to see the patient (eg, review of tests), obtaining and/or reviewing separately obtained history, documenting clinical information in the electronic or other health record, independently interpreting results and  communicating results to the patient/family/caregiver, or care coordinator  Reassurance with today's in office UA; emptying bladder well.

## 2024-12-23 ENCOUNTER — CLINICAL SUPPORT (OUTPATIENT)
Dept: REHABILITATION | Facility: OTHER | Age: 64
End: 2024-12-23
Payer: COMMERCIAL

## 2024-12-23 DIAGNOSIS — G89.18 ACUTE POSTOPERATIVE PAIN OF RIGHT SHOULDER: ICD-10-CM

## 2024-12-23 DIAGNOSIS — M25.511 ACUTE POSTOPERATIVE PAIN OF RIGHT SHOULDER: ICD-10-CM

## 2024-12-23 DIAGNOSIS — Z98.890 POST-OPERATIVE STATE: Primary | ICD-10-CM

## 2024-12-23 PROCEDURE — 97530 THERAPEUTIC ACTIVITIES: CPT | Mod: PN | Performed by: PHYSICAL THERAPIST

## 2024-12-23 PROCEDURE — 97140 MANUAL THERAPY 1/> REGIONS: CPT | Mod: PN | Performed by: PHYSICAL THERAPIST

## 2024-12-23 NOTE — PROGRESS NOTES
"[facility]   Physical Therapy Treatment Note      Name: Santiago De Luna  [facility] Number: [id number]    Therapy Diagnosis:   Encounter Diagnoses   Name Primary?    Post-operative state Yes    Acute postoperative pain of right shoulder      Physician: Rivera Fernandes, *    Visit Date: 12/23/2024    Physician Orders: PT Eval and Treat:      Massive size Rotator cuff protocol  Biceps tenodesis was not performed; if tenodesis was performed limit aggressive biceps flexion exercises for first 6 weeks     Start Therapy in 3-5 days      Use Sling and Abduction Pillow - For  6 weeks then discontinue pillow at 6 weeks time to protect rotator cuff repair and biceps tenodesis. Discontinue all immobilization in 6 weeks. Remove arm from sling immediately and move elbow and wrist as tolerated.     Cuff specific program:  Pendulum exercises and Codman's exercises in 3-5 days  protecting rotator cuff repair for 6 weeks by avoiding active motion program until 6 weeks.     PASSIVE ROM: "ER side 30 degrees, Forward Flex 90 degrees, ABD - 60 degrees   Full AAROM/PROM starting at 6 weeks as tolerated   Medical Diagnosis from Referral: M75.101 (ICD-10-CM) - Rotator cuff syndrome, right S46.011A (ICD-10-CM) - Traumatic complete tear of right rotator cuff, initial encounter M25.811 (ICD-10-CM) - Impingement of right shoulder  Evaluation Date: 11/18/2024  Authorization Period Expiration: 12/31/2024  Plan of Care Expiration: 2/14/2025  Progress Note Due: 12/18/2024  Date of Surgery: 11/12/2024  Visit # / Visits authorized: 5/ 21   FOTO: 1/ 3     Precautions: Standard and post-op       Time In: 1020  Time Out: 1100  Total Billable Time: 40 minutes    PTA Visit #: 0/5       Subjective     Patient reports: doing well. MD released him from pillow, has a smaller sling to use for comfort or when out in the community. He has mild soreness just from getting used to walking without sling. He was able to go for his usual 3 " mile walk yesterday without pain.     He was compliant with home exercise program.  Response to previous treatment: good  Functional change: able to resume 3 mile walks    Pain: 1/10  Location: R shoulder    Objective      Objective Measures updated at progress report unless specified.       Treatment     Santiago received the treatments listed below:      therapeutic exercises to develop strength, endurance, ROM, flexibility, posture, and core stabilization for 00 minutes including:  Table slides flexion to 90 deg x 3 min    manual therapy techniques: Joint mobilizations, Myofacial release, and Soft tissue Mobilization were applied to the: R shoulder for 10 minutes, including:  STM/MFR pecs, deltoids, posterior scap mm's (infra and supraspinatus, UT/LS)  GHJ glides all directions Gr I-III, GHJ distraction GrI-III with gentle oscillations  Gentle PROM of shoulder  ER, flex, abd in scap plane within tolerable and available range as indicated per MD protocol    neuromuscular re-education activities to improve: Balance, Coordination, Kinesthetic, Sense, Proprioception, and Posture for 00 minutes. The following activities were included:  None today    therapeutic activities to improve functional performance for 30 minutes, including:  Wrist x 1# x 30 ea   Flexion   Extension   Radial deviation   Pronation/supination   with power web 20x  Finger extension with red digi ball 2 x 10  Development, demonstration, and review of new home exercise program to include progression of protocol while pt is traveling over the next 3 weeks. Heavy education to avoid painful ROM. All exercises demonstrated in clinic with cuing as needed.   Wall walk   Chest press with dowel/cane   OH flex with dowel/cane   AA ER supine at 45 with dowel/cane   Standing IR/ER with dowel/cane   Standing assisted flexion with dowel/cane   Resisted scap squeeze/progress to row (no extension past neutral)   Isometrics in all planes        Patient Education  and Home Exercises       Education provided:   - none today    Written Home Exercises Provided: Pt instructed to continue prior HEP. Exercises were reviewed and Santiago was able to demonstrate them prior to the end of the session.  Santiago demonstrated good  understanding of the education provided. See Electronic Medical Record under Patient Instructions for exercises provided during therapy sessions    Assessment     Pt educated on progression of HEP to be used while he is traveling over the next 3 weeks. All exercises performed in clinic. Pt educated on maintaining painfree ROM, as well as gradual progressions as needed. Pt instructed to contact PT via portal as needed. We will reassess status on return in January and proceed as appropriate.     Santiago Is progressing well towards his goals.   Patient prognosis is Good.     Patient will continue to benefit from skilled outpatient physical therapy to address the deficits listed in the problem list box on initial evaluation, provide pt/family education and to maximize pt's level of independence in the home and community environment.     Patient's spiritual, cultural and educational needs considered and pt agreeable to plan of care and goals.     Anticipated barriers to physical therapy: standard, transportation    Goals: updated 12/23/2024   Short Term Goals (4 Weeks):   1. Pt to demonstrate improved PROM by 10% to allow pt to perform self care activities with increased ease. (Not met, progressing)  2. Pt to demonstrate improved flexibility by a half grade to allow improved ADLs with increased ease.  (Not met, progressing)  3. Pt will report <2/10 pain within the R shoulder for ease with donning/doffing sling. (Not met, progressing)  4. Pt will report being independent with his/her HEP for maintenance of improvements gained during therapy sessions (Not met, progressing)  5. Pt to demonstrate improved functional ability with FOTO score >=48% . (Not met, progressing)      Long Term Goals (12 Weeks):   1. Pt to demonstrate improved ROM to WNL, R=L, to allow pt to perform self care with increased ease. (Not met, progressing)  2. Pt to demonstrate improved flexibility by a full grade to allow improved postural alignment with increased ease. (Not met, progressing)  3. Pt will demonstrate >=4+/5 strength within the R shoulder for ease with house hold chores (Not met, progressing)  4. Pt to demonstrate improved functional ability with FOTO score >=67% . (Not met, progressing)  5. Pt independent with HEP and demonstrates good return technique. (Not met, progressing)  Plan     Cont w/ PT POC per MD protocol for jt mobility, ROM, scap activation.      Felicita Anderson, PT, DPT

## 2024-12-26 ENCOUNTER — OFFICE VISIT (OUTPATIENT)
Dept: PODIATRY | Facility: CLINIC | Age: 64
End: 2024-12-26
Payer: COMMERCIAL

## 2024-12-26 VITALS — HEIGHT: 72 IN | WEIGHT: 204.56 LBS | BODY MASS INDEX: 27.71 KG/M2

## 2024-12-26 DIAGNOSIS — M79.675 TOE PAIN, LEFT: Primary | ICD-10-CM

## 2024-12-26 DIAGNOSIS — L03.039 PARONYCHIA OF TOE, UNSPECIFIED LATERALITY: ICD-10-CM

## 2024-12-26 DIAGNOSIS — L60.0 INGROWN NAIL: ICD-10-CM

## 2024-12-26 PROCEDURE — 99214 OFFICE O/P EST MOD 30 MIN: CPT | Mod: S$GLB,,, | Performed by: PODIATRIST

## 2024-12-26 PROCEDURE — 99999 PR PBB SHADOW E&M-EST. PATIENT-LVL III: CPT | Mod: PBBFAC,,, | Performed by: PODIATRIST

## 2024-12-26 RX ORDER — TOBRAMYCIN 3 MG/ML
SOLUTION/ DROPS OPHTHALMIC
Qty: 5 ML | Refills: 3 | Status: SHIPPED | OUTPATIENT
Start: 2024-12-26

## 2024-12-26 NOTE — PROGRESS NOTES
Subjective:      Patient ID: Santiago De Luna is a 64 y.o. male.    Chief Complaint: Ingrown Toenail (L great toe)    Sharp, throbbing pain left big toe/toenail.  Chronic condition present off and on throughout life.  This exacerbations been gradual onset worsening over the past few days aggravated with socks shoes pressure ambulation.  Prior treatment multiple times with trimming and topical antibiotics helps symptoms for a period after which the tendon returned..  Self-treatment with pedicures usually works but does not this time.  Denies trauma and surgery left hallux.    Review of Systems   Constitutional: Negative for chills, diaphoresis, fever, malaise/fatigue and night sweats.   Cardiovascular:  Negative for claudication, cyanosis, leg swelling and syncope.   Skin:  Positive for nail changes. Negative for color change, dry skin, rash, suspicious lesions and unusual hair distribution.   Musculoskeletal:  Negative for falls, joint pain, joint swelling, muscle cramps, muscle weakness and stiffness.   Gastrointestinal:  Negative for constipation, diarrhea, nausea and vomiting.   Neurological:  Negative for brief paralysis, disturbances in coordination, focal weakness, numbness, paresthesias, sensory change and tremors.           Objective:      Physical Exam  Constitutional:       General: He is not in acute distress.     Appearance: He is well-developed. He is not diaphoretic.   Cardiovascular:      Pulses:           Popliteal pulses are 2+ on the right side and 2+ on the left side.        Dorsalis pedis pulses are 2+ on the right side and 2+ on the left side.        Posterior tibial pulses are 2+ on the right side and 2+ on the left side.      Comments: Capillary refill 3 seconds all toes/distal feet, all toes/both feet warm to touch.      Negative lymphadenopathy bilateral popliteal fossa and tarsal tunnel.      Negavie lower extremity edema bilateral.    Musculoskeletal:      Right ankle: No swelling, deformity,  ecchymosis or lacerations. Normal range of motion. Normal pulse.      Right Achilles Tendon: Normal. No defects. Alfred's test negative.   Lymphadenopathy:      Lower Body: No right inguinal adenopathy. No left inguinal adenopathy.      Comments: Negative lymphadenopathy bilateral popliteal fossa and tarsal tunnel.    Negative lymphangitic streaking bilateral feet/ankles/legs.   Skin:     General: Skin is warm and dry.      Capillary Refill: Capillary refill takes 2 to 3 seconds.      Coloration: Skin is not pale.      Findings: No abrasion, bruising, burn, ecchymosis, erythema, laceration, lesion or rash.      Nails: There is no clubbing.      Comments:   Visible and palpable ingrowth of toenail medial border left hallux with pain to palpation, and focal localized erythema and edema,  without ulceration, drainage, pus, tracking, fluctuance, malodor, or cardinal signs infection.       Neurological:      Mental Status: He is alert and oriented to person, place, and time.      Sensory: No sensory deficit.      Motor: No tremor, atrophy or abnormal muscle tone.      Gait: Gait normal.      Deep Tendon Reflexes:      Reflex Scores:       Patellar reflexes are 2+ on the right side and 2+ on the left side.       Achilles reflexes are 2+ on the right side and 2+ on the left side.  Psychiatric:         Behavior: Behavior is cooperative.             Assessment:       Encounter Diagnoses   Name Primary?    Toe pain, left Yes    Ingrown nail     Paronychia of toe, unspecified laterality          Plan:       Santiago was seen today for ingrown toenail.    Diagnoses and all orders for this visit:    Toe pain, left    Ingrown nail    Paronychia of toe, unspecified laterality    Other orders  -     tobramycin sulfate 0.3% (TOBREX) 0.3 % ophthalmic solution; 1-2 drops topically twice daily to affected toe(s).      I counseled the patient on his conditions, their implications and medical management.    Tobramycin drops twice daily  left hallux.      Cover left hallux all times with Band-Aid or similar changing daily.      Discussed conservative treatment with shoes of adequate dimensions, material, and style to alleviate symptoms and delay or prevent surgical intervention.      Utilizing sterile toenail clippers I aggressively debrided the offending nail border approximately 3 mm from its edge and carried the nail plate incision down at an angle in order to wedge out the offending cryptotic portion of the nail plate. The offending border was then removed in toto. The area was cleansed with alcohol. Patient tolerated the procedure well and related significant relief.      Recommend matricectomy medial left hallux on returned from [place] trip.  Patient will consider    No follow-ups on file.

## 2025-01-15 ENCOUNTER — CLINICAL SUPPORT (OUTPATIENT)
Dept: REHABILITATION | Facility: OTHER | Age: 65
End: 2025-01-15
Payer: COMMERCIAL

## 2025-01-15 DIAGNOSIS — G89.18 ACUTE POSTOPERATIVE PAIN OF RIGHT SHOULDER: ICD-10-CM

## 2025-01-15 DIAGNOSIS — Z98.890 POST-OPERATIVE STATE: Primary | ICD-10-CM

## 2025-01-15 DIAGNOSIS — M25.511 ACUTE POSTOPERATIVE PAIN OF RIGHT SHOULDER: ICD-10-CM

## 2025-01-15 PROCEDURE — 97530 THERAPEUTIC ACTIVITIES: CPT | Mod: PN | Performed by: PHYSICAL THERAPIST

## 2025-01-15 PROCEDURE — 97110 THERAPEUTIC EXERCISES: CPT | Mod: PN | Performed by: PHYSICAL THERAPIST

## 2025-01-15 PROCEDURE — 97112 NEUROMUSCULAR REEDUCATION: CPT | Mod: PN | Performed by: PHYSICAL THERAPIST

## 2025-01-15 PROCEDURE — 97140 MANUAL THERAPY 1/> REGIONS: CPT | Mod: PN | Performed by: PHYSICAL THERAPIST

## 2025-01-15 NOTE — PROGRESS NOTES
"[facility]   Physical Therapy Treatment Note      Name: Santiago De Luna  [facility] Number: [id number]    Therapy Diagnosis:   Encounter Diagnoses   Name Primary?    Post-operative state Yes    Acute postoperative pain of right shoulder      Physician: Rivera Fernandes, *    Visit Date: 1/15/2025    Physician Orders: PT Eval and Treat:      Massive size Rotator cuff protocol  Biceps tenodesis was not performed; if tenodesis was performed limit aggressive biceps flexion exercises for first 6 weeks     Start Therapy in 3-5 days      Use Sling and Abduction Pillow - For  6 weeks then discontinue pillow at 6 weeks time to protect rotator cuff repair and biceps tenodesis. Discontinue all immobilization in 6 weeks. Remove arm from sling immediately and move elbow and wrist as tolerated.     Cuff specific program:  Pendulum exercises and Codman's exercises in 3-5 days  protecting rotator cuff repair for 6 weeks by avoiding active motion program until 6 weeks.     PASSIVE ROM: "ER side 30 degrees, Forward Flex 90 degrees, ABD - 60 degrees   Full AAROM/PROM starting at 6 weeks as tolerated   Medical Diagnosis from Referral: M75.101 (ICD-10-CM) - Rotator cuff syndrome, right S46.011A (ICD-10-CM) - Traumatic complete tear of right rotator cuff, initial encounter M25.811 (ICD-10-CM) - Impingement of right shoulder  Evaluation Date: 11/18/2024  Authorization Period Expiration: 12/31/2024  Plan of Care Expiration: 2/14/2025  Progress Note Due: 2/14/2024  Date of Surgery: 11/12/2024  Visit # / Visits authorized: 6/ 21   FOTO: 2/ 3 last issued 1/15/2025     Precautions: Standard and post-op       Time In: 1015  Time Out: 1115  Total Billable Time: 50 minutes    PTA Visit #: 0/5       Subjective     Patient reports: he's doing well. He kept up with his exercises well while he was out of town. He had one day that he pushed it a little too much and was sore, but it resolved the next day    He was compliant with " home exercise program.  Response to previous treatment: good  Functional change: able to resume 3 mile walks    Pain: 1/10  Location: R shoulder    Objective      Objective Measures updated at progress report unless specified.     1/15/2024    R shoulder A/PROM  Flexion 155/160 deg (in supine)  ER at 90 passive 60 deg  IR at 90 passive 40 deg          CMS Impairment/Limitation/Restriction for FOTO Shoulder Survey    Therapist reviewed FOTO scores for Santiago De Luna on 1/15/2025.   FOTO documents entered into [employer] - see Media section.    Evaluation: 39%    Current : 48%    Goal: 67%        Treatment     Santiago received the treatments listed below:      therapeutic exercises to develop strength, endurance, ROM, flexibility, posture, and core stabilization for 15 minutes including:  Table slides flexion to 90 deg x 3 min  +Pulleys flexion and scaption x 3 min ea  +Supine chest press with 1# dowel x 15  +Supine OH flexion with 1# dowel x 15    +Wall walk flexion x 10     manual therapy techniques: Joint mobilizations, Myofacial release, and Soft tissue Mobilization were applied to the: R shoulder for 10 minutes, including:  STM/MFR pecs, deltoids, posterior scap mm's (infra and supraspinatus, UT/LS)  GHJ glides all directions Gr I-III, GHJ distraction GrI-III with gentle oscillations  Gentle PROM of shoulder  ER, flex, abd in scap plane within tolerable and available range as indicated per MD protocol    neuromuscular re-education activities to improve: Balance, Coordination, Kinesthetic, Sense, Proprioception, and Posture for 10 minutes. The following activities were included:  +Sidelying flexion with yellow pole x 20   +Sidelying ER with towel roll 2 x 10    +Ball on table circles 10x cw/ccw (green ball)    therapeutic activities to improve functional performance for 15 minutes, including:  Reassessment for progress note      Ice x 10 min to R shoulder      Patient Education and Home Exercises       Education provided:    - none today    Written Home Exercises Provided: Pt instructed to continue prior HEP. Exercises were reviewed and Santiago was able to demonstrate them prior to the end of the session.  Santiago demonstrated good  understanding of the education provided. See Electronic Medical Record under Patient Instructions for exercises provided during therapy sessions    Assessment     Pt returns to treatment after absence due to travel. He was consistent with HEP. Good improvement noted in ROM. Elevation is close to full passively, and ER/IR have shown good improvement. Continues with noteable weakness consistent with phase of recovery. Good tolerance to AAROM activities today, mod fatigue noted. Ice provided at end of session to reduce any pain and swelling.     Santiago Is progressing well towards his goals.   Patient prognosis is Good.     Patient will continue to benefit from skilled outpatient physical therapy to address the deficits listed in the problem list box on initial evaluation, provide pt/family education and to maximize pt's level of independence in the home and community environment.     Patient's spiritual, cultural and educational needs considered and pt agreeable to plan of care and goals.     Anticipated barriers to physical therapy: standard, transportation    Goals: updated 1/15/2025   Short Term Goals (4 Weeks):   1. Pt to demonstrate improved PROM by 10% to allow pt to perform self care activities with increased ease. (Not met, progressing)  2. Pt to demonstrate improved flexibility by a half grade to allow improved ADLs with increased ease.  (Not met, progressing)  3. Pt will report <2/10 pain within the R shoulder for ease with donning/doffing sling. (Not met, progressing)  4. Pt will report being independent with his/her HEP for maintenance of improvements gained during therapy sessions (Not met, progressing)  5. Pt to demonstrate improved functional ability with FOTO score >=48% . (Not met, progressing)      Long Term Goals (12 Weeks):   1. Pt to demonstrate improved ROM to WNL, R=L, to allow pt to perform self care with increased ease. (Not met, progressing)  2. Pt to demonstrate improved flexibility by a full grade to allow improved postural alignment with increased ease. (Not met, progressing)  3. Pt will demonstrate >=4+/5 strength within the R shoulder for ease with house hold chores (Not met, progressing)  4. Pt to demonstrate improved functional ability with FOTO score >=67% . (Not met, progressing)  5. Pt independent with HEP and demonstrates good return technique. (Not met, progressing)    Plan     Cont w/ PT POC per MD protocol for jt mobility, ROM, scap activation.      Felicita Anderson, PT, DPT

## 2025-01-17 ENCOUNTER — CLINICAL SUPPORT (OUTPATIENT)
Dept: REHABILITATION | Facility: OTHER | Age: 65
End: 2025-01-17
Payer: COMMERCIAL

## 2025-01-17 DIAGNOSIS — Z98.890 POST-OPERATIVE STATE: Primary | ICD-10-CM

## 2025-01-17 DIAGNOSIS — M25.511 ACUTE POSTOPERATIVE PAIN OF RIGHT SHOULDER: ICD-10-CM

## 2025-01-17 DIAGNOSIS — G89.18 ACUTE POSTOPERATIVE PAIN OF RIGHT SHOULDER: ICD-10-CM

## 2025-01-17 PROCEDURE — 97112 NEUROMUSCULAR REEDUCATION: CPT | Mod: PN

## 2025-01-17 PROCEDURE — 97140 MANUAL THERAPY 1/> REGIONS: CPT | Mod: PN

## 2025-01-17 PROCEDURE — 97110 THERAPEUTIC EXERCISES: CPT | Mod: PN

## 2025-01-17 NOTE — PROGRESS NOTES
"[facility]   Physical Therapy Treatment Note      Name: Santiago De Luna  [facility] Number: [id number]    Therapy Diagnosis:   Encounter Diagnoses   Name Primary?    Post-operative state Yes    Acute postoperative pain of right shoulder      Physician: Rivera Fernandes, *    Visit Date: 1/17/2025    Physician Orders: PT Eval and Treat:      Massive size Rotator cuff protocol  Biceps tenodesis was not performed; if tenodesis was performed limit aggressive biceps flexion exercises for first 6 weeks     Start Therapy in 3-5 days      Use Sling and Abduction Pillow - For  6 weeks then discontinue pillow at 6 weeks time to protect rotator cuff repair and biceps tenodesis. Discontinue all immobilization in 6 weeks. Remove arm from sling immediately and move elbow and wrist as tolerated.     Cuff specific program:  Pendulum exercises and Codman's exercises in 3-5 days  protecting rotator cuff repair for 6 weeks by avoiding active motion program until 6 weeks.     PASSIVE ROM: "ER side 30 degrees, Forward Flex 90 degrees, ABD - 60 degrees   Full AAROM/PROM starting at 6 weeks as tolerated   Medical Diagnosis from Referral: M75.101 (ICD-10-CM) - Rotator cuff syndrome, right S46.011A (ICD-10-CM) - Traumatic complete tear of right rotator cuff, initial encounter M25.811 (ICD-10-CM) - Impingement of right shoulder  Evaluation Date: 11/18/2024  Authorization Period Expiration: 12/31/2024  Plan of Care Expiration: 2/14/2025  Progress Note Due: 2/14/2024  Date of Surgery: 11/12/2024  Visit # / Visits authorized: 7/ 21   FOTO: 2/ 3 last issued 1/15/2025     Precautions: Standard and post-op       Time In: 1000  Time Out: 1100  Total Billable Time: 60 minutes    PTA Visit #: 0/5       Subjective     Patient reports: he's doing well. He kept up with his exercises well while he was out of town. He had one day that he pushed it a little too much and was sore, but it resolved the next day    He was compliant with " home exercise program.  Response to previous treatment: good  Functional change: able to resume 3 mile walks    Pain: 1/10  Location: R shoulder    Objective      Objective Measures updated at progress report unless specified.     1/15/2024    R shoulder A/PROM  Flexion 155/160 deg (in supine)  ER at 90 passive 60 deg  IR at 90 passive 40 deg          CMS Impairment/Limitation/Restriction for FOTO Shoulder Survey    Therapist reviewed FOTO scores for Santiago De Luna on 1/15/2025.   FOTO documents entered into [employer] - see Media section.    Evaluation: 39%    Current : 48%    Goal: 67%        Treatment     Santiago received the treatments listed below:      therapeutic exercises to develop strength, endurance, ROM, flexibility, posture, and core stabilization for 23 minutes including:  Table slides flexion to 90 deg x 3 min  Pulleys flexion and scaption x 3 min ea  Supine chest press with 1# dowel x 20  Supine OH flexion with 1# dowel x 20    Wall walk flexion x 20     manual therapy techniques: Joint mobilizations, Myofacial release, and Soft tissue Mobilization were applied to the: R shoulder for 15 minutes, including:  STM/MFR pecs, deltoids, posterior scap mm's (infra and supraspinatus, UT/LS)  GHJ glides all directions Gr I-III, GHJ distraction GrI-III with gentle oscillations  Gentle PROM of shoulder  ER, flex, abd in scap plane within tolerable and available range as indicated per MD protocol    neuromuscular re-education activities to improve: Balance, Coordination, Kinesthetic, Sense, Proprioception, and Posture for 22 minutes. The following activities were included:  Sidelying flexion with yellow pole x 30   Sidelying ER with towel roll 3 x 10  Ball on table circles 20x cw/ccw (green ball)    Consider adding prone scap series nv    therapeutic activities to improve functional performance for 00 minutes, including:  Reassessment for progress note      Ice x 10 min to R shoulder -- defer today      Patient Education  and Home Exercises       Education provided:   - none today    Written Home Exercises Provided: Pt instructed to continue prior HEP. Exercises were reviewed and Santiago was able to demonstrate them prior to the end of the session.  Santiago demonstrated good  understanding of the education provided. See Electronic Medical Record under Patient Instructions for exercises provided during therapy sessions    Assessment     Increased repetitions with several exercises today with fair tolerance overall. Good improvement noted in ROM. Elevation is close to full passively, and ER/IR have shown good improvement. Continues with noteable weakness consistent with phase of recovery. Good tolerance to AAROM activities today, mod fatigue noted. Ice provided at end of session to reduce any pain and swelling.     Santiago Is progressing well towards his goals.   Patient prognosis is Good.     Patient will continue to benefit from skilled outpatient physical therapy to address the deficits listed in the problem list box on initial evaluation, provide pt/family education and to maximize pt's level of independence in the home and community environment.     Patient's spiritual, cultural and educational needs considered and pt agreeable to plan of care and goals.     Anticipated barriers to physical therapy: standard, transportation    Goals: updated 1/24/2025   Short Term Goals (4 Weeks):   1. Pt to demonstrate improved PROM by 10% to allow pt to perform self care activities with increased ease. (Not met, progressing)  2. Pt to demonstrate improved flexibility by a half grade to allow improved ADLs with increased ease.  (Not met, progressing)  3. Pt will report <2/10 pain within the R shoulder for ease with donning/doffing sling. (Not met, progressing)  4. Pt will report being independent with his/her HEP for maintenance of improvements gained during therapy sessions (Not met, progressing)  5. Pt to demonstrate improved functional ability with  FOTO score >=48% . (Not met, progressing)     Long Term Goals (12 Weeks):   1. Pt to demonstrate improved ROM to WNL, R=L, to allow pt to perform self care with increased ease. (Not met, progressing)  2. Pt to demonstrate improved flexibility by a full grade to allow improved postural alignment with increased ease. (Not met, progressing)  3. Pt will demonstrate >=4+/5 strength within the R shoulder for ease with house hold chores (Not met, progressing)  4. Pt to demonstrate improved functional ability with FOTO score >=67% . (Not met, progressing)  5. Pt independent with HEP and demonstrates good return technique. (Not met, progressing)    Plan     Cont w/ PT POC per MD protocol for jt mobility, ROM, scap activation.      Jo Ann Mei, PT

## 2025-01-20 ENCOUNTER — PATIENT MESSAGE (OUTPATIENT)
Dept: OTOLARYNGOLOGY | Facility: CLINIC | Age: 65
End: 2025-01-20
Payer: COMMERCIAL

## 2025-01-24 ENCOUNTER — TELEPHONE (OUTPATIENT)
Dept: OTOLARYNGOLOGY | Facility: CLINIC | Age: 65
End: 2025-01-24
Payer: COMMERCIAL

## 2025-01-24 ENCOUNTER — CLINICAL SUPPORT (OUTPATIENT)
Dept: REHABILITATION | Facility: OTHER | Age: 65
End: 2025-01-24
Payer: COMMERCIAL

## 2025-01-24 DIAGNOSIS — Z98.890 POST-OPERATIVE STATE: Primary | ICD-10-CM

## 2025-01-24 DIAGNOSIS — M25.511 ACUTE POSTOPERATIVE PAIN OF RIGHT SHOULDER: ICD-10-CM

## 2025-01-24 DIAGNOSIS — G89.18 ACUTE POSTOPERATIVE PAIN OF RIGHT SHOULDER: ICD-10-CM

## 2025-01-24 PROCEDURE — 97112 NEUROMUSCULAR REEDUCATION: CPT | Mod: PN,CQ

## 2025-01-24 PROCEDURE — 97110 THERAPEUTIC EXERCISES: CPT | Mod: PN,CQ

## 2025-01-24 PROCEDURE — 97140 MANUAL THERAPY 1/> REGIONS: CPT | Mod: PN,CQ

## 2025-01-24 NOTE — PROGRESS NOTES
"[facility]   Physical Therapy Treatment Note      Name: Santiago De Luna  [facility] Number: [id number]    Therapy Diagnosis:   Encounter Diagnoses   Name Primary?    Post-operative state Yes    Acute postoperative pain of right shoulder        Physician: Rivera Fernandes, *    Visit Date: 1/24/2025    Physician Orders: PT Eval and Treat:      Massive size Rotator cuff protocol  Biceps tenodesis was not performed; if tenodesis was performed limit aggressive biceps flexion exercises for first 6 weeks     Start Therapy in 3-5 days      Use Sling and Abduction Pillow - For  6 weeks then discontinue pillow at 6 weeks time to protect rotator cuff repair and biceps tenodesis. Discontinue all immobilization in 6 weeks. Remove arm from sling immediately and move elbow and wrist as tolerated.     Cuff specific program:  Pendulum exercises and Codman's exercises in 3-5 days  protecting rotator cuff repair for 6 weeks by avoiding active motion program until 6 weeks.     PASSIVE ROM: "ER side 30 degrees, Forward Flex 90 degrees, ABD - 60 degrees   Full AAROM/PROM starting at 6 weeks as tolerated   Medical Diagnosis from Referral: M75.101 (ICD-10-CM) - Rotator cuff syndrome, right S46.011A (ICD-10-CM) - Traumatic complete tear of right rotator cuff, initial encounter M25.811 (ICD-10-CM) - Impingement of right shoulder  Evaluation Date: 11/18/2024  Authorization Period Expiration: 12/31/2024  Plan of Care Expiration: 2/14/2025  Progress Note Due: 2/14/2024  Date of Surgery: 11/12/2024  Visit # / Visits authorized: 8/ 21   FOTO: 3/ 3 last issued 1/15/2025     Precautions: Standard and post-op       Time In: 1005  Time Out: 1052  Total Billable Time: 47 minutes    PTA Visit #: 1/5       Subjective     Patient states feeling well with no complaint of pain in right shoulder.  Patient mentioned taking celebrex after last visit for joint and muscle soreness.     He was compliant with home exercise " program.  Response to previous treatment: Some joint and muscle soreness   Functional change: no change reported     Pain: 0/10  Location: R shoulder    Objective      Objective Measures updated at progress report unless specified.     1/15/2024    R shoulder A/PROM  Flexion 155/160 deg (in supine)  ER at 90 passive 60 deg  IR at 90 passive 40 deg          CMS Impairment/Limitation/Restriction for FOTO Shoulder Survey    Therapist reviewed FOTO scores for Santiago De Luna on 1/15/2025.   FOTO documents entered into [employer] - see Media section.    Evaluation: 39%    Current : 48%    Goal: 67%        Treatment     Santiago received the treatments listed below:      therapeutic exercises to develop strength, endurance, ROM, flexibility, posture, and core stabilization for 10 minutes including:  Table slides flexion to 90 deg x 3 min  Pulleys flexion and scaption x 3 min ea  Supine chest press with 1# dowel x 20  Supine OH flexion with 1# dowel x 20    Wall walk flexion x 20     manual therapy techniques: Joint mobilizations, Myofacial release, and Soft tissue Mobilization were applied to the: R shoulder for 15 minutes, including:  STM/MFR pecs, deltoids, posterior scap mm's (infra and supraspinatus, UT/LS)  GHJ glides all directions Gr I-III, GHJ distraction GrI-III with gentle oscillations  Gentle PROM of shoulder  ER, flex, abd in scap plane within tolerable and available range as indicated per MD protocol    neuromuscular re-education activities to improve: Balance, Coordination, Kinesthetic, Sense, Proprioception, and Posture for 22 minutes. The following activities were included:  Sidelying flexion with yellow pole 2 x 10   Sidelying ER with towel roll 2 x 10  Ball on table circles 20x cw/ccw (green ball)  Prone scap retraction 2 x 10  Prone horizontal abd 2 x 10 (limited motion due to pain at end range )   Prone shoulder extension 2 x 10     therapeutic activities to improve functional performance for 00 minutes,  including:  Reassessment for progress note      Ice x 10 min to R shoulder -- defer today      Patient Education and Home Exercises       Education provided:   - Patient educated on proper exercise technique.      Written Home Exercises Provided: Pt instructed to continue prior HEP. Exercises were reviewed and Santiago was able to demonstrate them prior to the end of the session.  Santiago demonstrated good  understanding of the education provided. See Electronic Medical Record under Patient Instructions for exercises provided during therapy sessions    Assessment   Patient had slight discomfort with prone horizontal abduction, prone shoulder extension and sidelying external rotation at end range.  Patient displayed good effort during treatment.  Patient tolerated treatment well.  Pain level remained same prior to and after treatment session.  Patient continues to require some verbal and tactile cues for scap stability.        Santiago Is progressing well towards his goals.   Patient prognosis is Good.     Patient will continue to benefit from skilled outpatient physical therapy to address the deficits listed in the problem list box on initial evaluation, provide pt/family education and to maximize pt's level of independence in the home and community environment.     Patient's spiritual, cultural and educational needs considered and pt agreeable to plan of care and goals.     Anticipated barriers to physical therapy: standard, transportation    Goals: updated 1/24/2025   Short Term Goals (4 Weeks):   1. Pt to demonstrate improved PROM by 10% to allow pt to perform self care activities with increased ease. (Not met, progressing)  2. Pt to demonstrate improved flexibility by a half grade to allow improved ADLs with increased ease.  (Not met, progressing)  3. Pt will report <2/10 pain within the R shoulder for ease with donning/doffing sling. (Not met, progressing)  4. Pt will report being independent with his/her HEP for  maintenance of improvements gained during therapy sessions (Not met, progressing)  5. Pt to demonstrate improved functional ability with FOTO score >=48% . (Not met, progressing)     Long Term Goals (12 Weeks):   1. Pt to demonstrate improved ROM to WNL, R=L, to allow pt to perform self care with increased ease. (Not met, progressing)  2. Pt to demonstrate improved flexibility by a full grade to allow improved postural alignment with increased ease. (Not met, progressing)  3. Pt will demonstrate >=4+/5 strength within the R shoulder for ease with house hold chores (Not met, progressing)  4. Pt to demonstrate improved functional ability with FOTO score >=67% . (Not met, progressing)  5. Pt independent with HEP and demonstrates good return technique. (Not met, progressing)    Plan     Cont to progress towards goals set by PT focusing on joint range of motion and scap control.      Go Tafoya, PTA

## 2025-01-24 NOTE — PROGRESS NOTES
"[facility]   Physical Therapy Treatment Note      Name: Santiago De Luna  [facility] Number: [id number]    Therapy Diagnosis:   No diagnosis found.    Physician: Rivera Fernandes, *    Visit Date: 1/24/2025    Physician Orders: PT Eval and Treat:      Massive size Rotator cuff protocol  Biceps tenodesis was not performed; if tenodesis was performed limit aggressive biceps flexion exercises for first 6 weeks     Start Therapy in 3-5 days      Use Sling and Abduction Pillow - For  6 weeks then discontinue pillow at 6 weeks time to protect rotator cuff repair and biceps tenodesis. Discontinue all immobilization in 6 weeks. Remove arm from sling immediately and move elbow and wrist as tolerated.     Cuff specific program:  Pendulum exercises and Codman's exercises in 3-5 days  protecting rotator cuff repair for 6 weeks by avoiding active motion program until 6 weeks.     PASSIVE ROM: "ER side 30 degrees, Forward Flex 90 degrees, ABD - 60 degrees   Full AAROM/PROM starting at 6 weeks as tolerated   Medical Diagnosis from Referral: M75.101 (ICD-10-CM) - Rotator cuff syndrome, right S46.011A (ICD-10-CM) - Traumatic complete tear of right rotator cuff, initial encounter M25.811 (ICD-10-CM) - Impingement of right shoulder  Evaluation Date: 11/18/2024  Authorization Period Expiration: 12/31/2024  Plan of Care Expiration: 2/14/2025  Progress Note Due: 2/14/2024  Date of Surgery: 11/12/2024  Visit # / Visits authorized: 7/ 21   FOTO: 2/ 3 last issued 1/15/2025     Precautions: Standard and post-op       Time In: 1005  Time Out: 1052  Total Billable Time: minutes    PTA Visit #: 1/5       Subjective     Patient states feeling well with no comlaint of pain in left shoulder.  Patient mentioned taking celebrex after last visit for soreness     He was compliant with home exercise program.  Response to previous treatment: good  Functional change: able to resume 3 mile walks    Pain: 0/10  Location: R " shoulder    Objective      Objective Measures updated at progress report unless specified.     1/15/2024    R shoulder A/PROM  Flexion 155/160 deg (in supine)  ER at 90 passive 60 deg  IR at 90 passive 40 deg          CMS Impairment/Limitation/Restriction for FOTO Shoulder Survey    Therapist reviewed FOTO scores for Santiago De Luna on 1/15/2025.   FOTO documents entered into [employer] - see Media section.    Evaluation: 39%    Current : 48%    Goal: 67%        Treatment     Santiago received the treatments listed below:      therapeutic exercises to develop strength, endurance, ROM, flexibility, posture, and core stabilization for 23 minutes including:  Table slides flexion to 90 deg x 3 min  Pulleys flexion and scaption x 3 min ea  Supine chest press with 1# dowel x 20  Supine OH flexion with 1# dowel x 20    Wall walk flexion x 20     manual therapy techniques: Joint mobilizations, Myofacial release, and Soft tissue Mobilization were applied to the: R shoulder for 15 minutes, including:  STM/MFR pecs, deltoids, posterior scap mm's (infra and supraspinatus, UT/LS)  GHJ glides all directions Gr I-III, GHJ distraction GrI-III with gentle oscillations  Gentle PROM of shoulder  ER, flex, abd in scap plane within tolerable and available range as indicated per MD protocol    neuromuscular re-education activities to improve: Balance, Coordination, Kinesthetic, Sense, Proprioception, and Posture for 22 minutes. The following activities were included:  Sidelying flexion with yellow pole 2 x 10   Sidelying ER with towel roll 2 x 10  Ball on table circles 20x cw/ccw (green ball)  Prone scap retraction 2 x 10  Prone horizontal abd 2 x 10 (limited motion due to pain at end range )   Prone shoulder extension 2 x 10     therapeutic activities to improve functional performance for 00 minutes, including:  Reassessment for progress note      Ice x 10 min to R shoulder -- defer today      Patient Education and Home Exercises       Education  provided:   - none today    Written Home Exercises Provided: Pt instructed to continue prior HEP. Exercises were reviewed and Santiago was able to demonstrate them prior to the end of the session.  Santiago demonstrated good  understanding of the education provided. See Electronic Medical Record under Patient Instructions for exercises provided during therapy sessions    Assessment   Patient had slight discomfort with prone horizontal abduction, prone shoulder extension and sidelying external rotation at end range.      Increased repetitions with several exercises today with fair tolerance overall. Good improvement noted in ROM. Elevation is close to full passively, and ER/IR have shown good improvement. Continues with noteable weakness consistent with phase of recovery. Good tolerance to AAROM activities today, mod fatigue noted. Ice provided at end of session to reduce any pain and swelling.     Santiago Is progressing well towards his goals.   Patient prognosis is Good.     Patient will continue to benefit from skilled outpatient physical therapy to address the deficits listed in the problem list box on initial evaluation, provide pt/family education and to maximize pt's level of independence in the home and community environment.     Patient's spiritual, cultural and educational needs considered and pt agreeable to plan of care and goals.     Anticipated barriers to physical therapy: standard, transportation    Goals: updated 1/24/2025   Short Term Goals (4 Weeks):   1. Pt to demonstrate improved PROM by 10% to allow pt to perform self care activities with increased ease. (Not met, progressing)  2. Pt to demonstrate improved flexibility by a half grade to allow improved ADLs with increased ease.  (Not met, progressing)  3. Pt will report <2/10 pain within the R shoulder for ease with donning/doffing sling. (Not met, progressing)  4. Pt will report being independent with his/her HEP for maintenance of improvements gained  during therapy sessions (Not met, progressing)  5. Pt to demonstrate improved functional ability with FOTO score >=48% . (Not met, progressing)     Long Term Goals (12 Weeks):   1. Pt to demonstrate improved ROM to WNL, R=L, to allow pt to perform self care with increased ease. (Not met, progressing)  2. Pt to demonstrate improved flexibility by a full grade to allow improved postural alignment with increased ease. (Not met, progressing)  3. Pt will demonstrate >=4+/5 strength within the R shoulder for ease with house hold chores (Not met, progressing)  4. Pt to demonstrate improved functional ability with FOTO score >=67% . (Not met, progressing)  5. Pt independent with HEP and demonstrates good return technique. (Not met, progressing)    Plan     Cont w/ PT POC per MD protocol for jt mobility, ROM, scap activation.      Go Tafoya, PTA

## 2025-01-29 ENCOUNTER — OFFICE VISIT (OUTPATIENT)
Dept: SPORTS MEDICINE | Facility: CLINIC | Age: 65
End: 2025-01-29
Payer: COMMERCIAL

## 2025-01-29 ENCOUNTER — CLINICAL SUPPORT (OUTPATIENT)
Dept: REHABILITATION | Facility: OTHER | Age: 65
End: 2025-01-29
Payer: COMMERCIAL

## 2025-01-29 VITALS
HEART RATE: 71 BPM | DIASTOLIC BLOOD PRESSURE: 85 MMHG | BODY MASS INDEX: 28.37 KG/M2 | HEIGHT: 72 IN | SYSTOLIC BLOOD PRESSURE: 136 MMHG | WEIGHT: 209.44 LBS

## 2025-01-29 DIAGNOSIS — Z98.890 POST-OPERATIVE STATE: Primary | ICD-10-CM

## 2025-01-29 DIAGNOSIS — G89.18 ACUTE POSTOPERATIVE PAIN OF RIGHT SHOULDER: ICD-10-CM

## 2025-01-29 DIAGNOSIS — M25.511 ACUTE POSTOPERATIVE PAIN OF RIGHT SHOULDER: ICD-10-CM

## 2025-01-29 DIAGNOSIS — S46.012S TRAUMATIC COMPLETE TEAR OF LEFT ROTATOR CUFF, SEQUELA: Primary | ICD-10-CM

## 2025-01-29 DIAGNOSIS — S46.011S TRAUMATIC COMPLETE TEAR OF RIGHT ROTATOR CUFF, SEQUELA: ICD-10-CM

## 2025-01-29 PROCEDURE — 99999 PR PBB SHADOW E&M-EST. PATIENT-LVL III: CPT | Mod: PBBFAC,,, | Performed by: ORTHOPAEDIC SURGERY

## 2025-01-29 PROCEDURE — 97110 THERAPEUTIC EXERCISES: CPT | Mod: PN | Performed by: PHYSICAL THERAPIST

## 2025-01-29 PROCEDURE — 97530 THERAPEUTIC ACTIVITIES: CPT | Mod: PN | Performed by: PHYSICAL THERAPIST

## 2025-01-29 PROCEDURE — 97112 NEUROMUSCULAR REEDUCATION: CPT | Mod: PN | Performed by: PHYSICAL THERAPIST

## 2025-01-29 PROCEDURE — 97140 MANUAL THERAPY 1/> REGIONS: CPT | Mod: PN | Performed by: PHYSICAL THERAPIST

## 2025-01-29 NOTE — PROGRESS NOTES
Subjective:     Chief Complaint: Santiago De Luna is a 64 y.o. male who had concerns including Post-op Evaluation of the Right Shoulder.    History of Present Illness    CHIEF COMPLAINT:  - Right shoulder post-operative follow-up    HPI:  Santiago presents for follow-up regarding his right shoulder. He reports attending physical therapy sessions with Felicita at hospitals, which involve movement exercises that are beneficial. He has been diligently caring for his shoulder. The practitioner notes that this is a massive tear, and it will be a year-long process of rehabilitation. He reports minimal use of pain medication, stating he took one Celebrex last week after a strenuous day, but is not taking anything regularly.    He denies having had an x-ray since surgery.    PREVIOUS TREATMENTS:  - Santiago has been attending physical therapy with Felicita at hospitals. The therapist has been working on moving the patient's shoulder around.  - Santiago has been focusing on shoulder blade control and periscapular strengthening exercises as part of his therapy.  - Santiago performed exercises on his belly during his last therapy session, focusing on keeping his shoulder pinched.    SURGICAL HISTORY:  - Shoulder surgery: date not specified           Pain Related Questions  Over the past 3 days, what was your average pain during activity? (I.e. running, jogging, walking, climbing stairs, getting dressed, ect.): 0  Over the past 3 days, what was your highest pain level?: 1  Over the past 3 days, what was your lowest pain level? : 0    Other  How many nights a week are you awakened by your affected body part?: 0  Was the patient's HEIGHT measured or patient reported?: Patient Reported  Was the patient's WEIGHT measured or patient reported?: Measured    Past Surgical History:   Procedure Laterality Date    ANTERIOR CRUCIATE LIGAMENT REPAIR Bilateral     ARTHROSCOPIC DEBRIDEMENT OF SHOULDER Right 11/12/2024    Procedure: DEBRIDEMENT, SHOULDER,  ARTHROSCOPIC;  Surgeon: Aura Schwarz MD;  Location: [facility] OR;  Service: Orthopedics;  Laterality: Right;    HERNIA REPAIR  09/2017    ROTATOR CUFF REPAIR Bilateral     ROTATOR CUFF REPAIR Right 11/12/2024    Procedure: REPAIR, ROTATOR CUFF;  Surgeon: Aura Schwarz MD;  Location: [facility] OR;  Service: Orthopedics;  Laterality: Right;  KWABENA 50cc       Objective:     General: Santiago is well-developed, well-nourished, appears stated age, in no acute distress, alert and oriented to time, place and person.     General    Vitals reviewed.  Constitutional: He is oriented to person, place, and time. He appears well-developed and well-nourished. No distress.   HENT:   Nose: Nose normal. Mouth/Throat: No oropharyngeal exudate.   Eyes: Pupils are equal, round, and reactive to light. Right eye exhibits no discharge. Left eye exhibits no discharge.   Cardiovascular:  Normal rate and intact distal pulses.            Pulmonary/Chest: Effort normal and breath sounds normal. No respiratory distress.   Neurological: He is alert and oriented to person, place, and time. He has normal reflexes. He displays normal reflexes. No cranial nerve deficit. Coordination normal.   Psychiatric: He has a normal mood and affect. His behavior is normal. Judgment and thought content normal.         Right Shoulder Exam     Inspection/Observation   Swelling: absent  Bruising: absent  Scars: present  Deformity: absent  Scapular Winging: absent  Scapular Dyskinesia: negative  Atrophy: absent    Tenderness   The patient is experiencing no tenderness.    Range of Motion   Active abduction:  90 normal   Passive abduction:  100 normal   Extension:  0 normal   Forward Flexion:  180 normal   Forward Elevation: 180 normal  Adduction: 40 normal  External Rotation 0 degrees:  30 normal   External Rotation 90 degrees: 80 normal  Internal rotation 0 degrees:  L5 normal   Internal rotation 90 degrees:  10 normal     Tests & Signs   Apprehension: negative  Cross arm:  negative  Drop arm: negative  Servin test: negative  Impingement: negative  Sulcus: absent  Rotator Cuff Painful Arc/Range: mild  Anterosuperior Escape: negative  Lag Sign 0 degrees: negative  Lag Sign 90 degrees: negative  Lift Off Sign: negative  Belly Press: negative  Active Compression Test (Beardsley's Sign): negative  Yergason's Test: negative  Speed's Test: negative  Anterior Drawer Test: 1+   Posterior Drawer Test: 1+  Relocation 90 degrees: negative  Relocation > 90 degrees: negative  Bear Hug: negative  Moving Valgus: negative  Jerk Test: negative    Other   Sensation: normal    Left Shoulder Exam     Inspection/Observation   Swelling: absent  Bruising: absent  Scars: absent  Deformity: absent  Scapular Winging: absent  Scapular Dyskinesia: negative  Atrophy: absent    Tenderness   The patient is experiencing no tenderness.     Range of Motion   Active abduction:  90 normal   Passive abduction:  100 normal   Extension:  0 normal   Forward Flexion:  180 normal   Forward Elevation: 180 normal  Adduction: 40 normal  External Rotation 0 degrees:  30 normal   External Rotation 90 degrees: 90 normal  Internal rotation 0 degrees:  L4 normal   Internal rotation 90 degrees:  10 normal     Tests & Signs   Apprehension: negative  Cross arm: negative  Drop arm: negative  Servin test: negative  Impingement: negative  Sulcus: absent  Anterosuperior Escape: negative  Lag Sign 0 degrees: negative  Lag Sign 90 degrees: negative  Lift Off Sign: negative  Belly Press: negative  Active Compression test (Beardsley's Sign): negative  Yergasons's Test: negative  Speed's Test: negative  Anterior Drawer Test: 1+  Posterior Drawer Test: 1+  Relocation 90 degrees: negative  Relocation > 90 degrees: negative  Bear Hug: negative  Moving Valgus: negative  Jerk Test: negative    Other   Sensation: normal       Muscle Strength   Right Upper Extremity   Shoulder Abduction: 5/5   Shoulder Internal Rotation: 4/5   Shoulder External Rotation:  4/5   Supraspinatus: 4/5   Subscapularis: 4/5   Biceps: 5/5   Left Upper Extremity  Shoulder Abduction: 5/5   Shoulder Internal Rotation: 5/5   Shoulder External Rotation: 5/5   Supraspinatus: 5/5   Subscapularis: 5/5   Biceps: 5/5     Reflexes     Left Side  Biceps:  2+  Triceps:  2+  Brachioradialis:  2+    Right Side   Biceps:  2+  Triceps:  2+  Brachioradialis:  2+    Vascular Exam     Right Pulses      Radial:                    2+      Left Pulses      Radial:                    2+      Capillary Refill  Right Hand: normal capillary refill  Left Hand: normal capillary refill              Radiographic findings today:  CT Temporal Bone with and without contrast  Narrative: EXAMINATION:  CT TEMPORAL BONE WITH AND WITHOUT; CT HEAD WITH AND WITHOUT    CLINICAL HISTORY:  Dizziness and giddiness    TECHNIQUE:  Low dose axial CT images obtained throughout the head and temporal bones before and after the administration of 75 mL Omnipaque 350 intravenous contrast. Sagittal and coronal reconstructions were performed.    COMPARISON:  None    FINDINGS:  Head CT:    Prominence of ventricles and sulci in keeping with cerebral volume loss.  This appears fairly generalized, without overt lobar predominance. Configuration is not suggestive of hydrocephalus.    Mild patchy hypoattenuation in the supratentorial white matter, nonspecific but most likely reflecting chronic small vessel ischemic changes. No recent or remote major vascular distribution infarct. No acute hemorrhage.  No mass effect or midline shift. No abnormal post-contrast enhancement.    No extra-axial blood or fluid collections.    No displaced calvarial fracture.    Mild patchy mucosal thickening with a few aerated secretions in the visualized paranasal sinuses.    Temporal bone:    Right:    External ear: Clear.    Middle ear: Well aerated. Ossicles unremarkable.    Petrous temporal bone/mastoid air cells: Clear.    Inner ear: Labyrinthine structures maintain  normal morphology.  Vestibular aqueduct is not enlarged.    IAC/CPA: Unremarkable.    Other: N/A.    Left:    External ear: Clear.    Middle ear: Well aerated. Ossicles unremarkable.    Petrous temporal bone/mastoid air cells: Clear.    Inner ear: Labyrinthine structures maintain normal morphology.  Vestibular aqueduct is not enlarged.    IAC/CPA: Unremarkable.    Other: N/A.  Impression: No evidence of acute intracranial pathology.    Mild chronic small vessel ischemic change and generalized cerebral volume loss.    Mild patchy inflammatory changes in the paranasal sinuses.    Temporal bones appear within normal limits.    Electronically signed by: Yovani Hussein MD  Date:    10/23/2024  Time:    13:24  CT Head W Wo Contrast  Narrative: EXAMINATION:  CT TEMPORAL BONE WITH AND WITHOUT; CT HEAD WITH AND WITHOUT    CLINICAL HISTORY:  Dizziness and giddiness    TECHNIQUE:  Low dose axial CT images obtained throughout the head and temporal bones before and after the administration of 75 mL Omnipaque 350 intravenous contrast. Sagittal and coronal reconstructions were performed.    COMPARISON:  None    FINDINGS:  Head CT:    Prominence of ventricles and sulci in keeping with cerebral volume loss.  This appears fairly generalized, without overt lobar predominance. Configuration is not suggestive of hydrocephalus.    Mild patchy hypoattenuation in the supratentorial white matter, nonspecific but most likely reflecting chronic small vessel ischemic changes. No recent or remote major vascular distribution infarct. No acute hemorrhage.  No mass effect or midline shift. No abnormal post-contrast enhancement.    No extra-axial blood or fluid collections.    No displaced calvarial fracture.    Mild patchy mucosal thickening with a few aerated secretions in the visualized paranasal sinuses.    Temporal bone:    Right:    External ear: Clear.    Middle ear: Well aerated. Ossicles unremarkable.    Petrous temporal bone/mastoid air  cells: Clear.    Inner ear: Labyrinthine structures maintain normal morphology.  Vestibular aqueduct is not enlarged.    IAC/CPA: Unremarkable.    Other: N/A.    Left:    External ear: Clear.    Middle ear: Well aerated. Ossicles unremarkable.    Petrous temporal bone/mastoid air cells: Clear.    Inner ear: Labyrinthine structures maintain normal morphology.  Vestibular aqueduct is not enlarged.    IAC/CPA: Unremarkable.    Other: N/A.  Impression: No evidence of acute intracranial pathology.    Mild chronic small vessel ischemic change and generalized cerebral volume loss.    Mild patchy inflammatory changes in the paranasal sinuses.    Temporal bones appear within normal limits.    Electronically signed by: Yovani Hussein MD  Date:    10/23/2024  Time:    13:24         These findings were discussed and reviewed with the patient.     Assessment:     Encounter Diagnoses   Name Primary?    Traumatic complete tear of left rotator cuff, sequela Yes    Traumatic complete tear of right rotator cuff, sequela         Plan:     Assessment & Plan    MEDICATIONS:  - Continue Celebrex as needed for pain.    IMAGING:  - Ordered XR.    PROCEDURES:  - Performed physical exam of shoulder range of motion and strength.    FOLLOW UP:  - Follow up in 3 months.    PATIENT INSTRUCTIONS:  - Focus on pinching shoulder blades together without hiking them up.  - Perform exercises laying back initially, progressing to upright position as strength improves.  - Avoid big lifts and sudden changes in activity.           All of the patient's questions were answered and the patient will contact us if they have any questions or concerns in the interim.    This note was generated with the assistance of ambient listening technology. Verbal consent was obtained by the patient and accompanying visitor(s) for the recording of patient appointment to facilitate this note. I attest to having reviewed and edited the generated note for accuracy, though some  syntax or spelling errors may persist. Please contact the author of this note for any clarification.

## 2025-01-29 NOTE — PROGRESS NOTES
"[facility]   Physical Therapy Treatment Note      Name: Santiago De Luna  [facility] Number: [id number]    Therapy Diagnosis:   Encounter Diagnoses   Name Primary?    Post-operative state Yes    Acute postoperative pain of right shoulder        Physician: Rivera Fernandes, *    Visit Date: 1/29/2025    Physician Orders: PT Eval and Treat:      Massive size Rotator cuff protocol  Biceps tenodesis was not performed; if tenodesis was performed limit aggressive biceps flexion exercises for first 6 weeks     Start Therapy in 3-5 days      Use Sling and Abduction Pillow - For  6 weeks then discontinue pillow at 6 weeks time to protect rotator cuff repair and biceps tenodesis. Discontinue all immobilization in 6 weeks. Remove arm from sling immediately and move elbow and wrist as tolerated.     Cuff specific program:  Pendulum exercises and Codman's exercises in 3-5 days  protecting rotator cuff repair for 6 weeks by avoiding active motion program until 6 weeks.     PASSIVE ROM: "ER side 30 degrees, Forward Flex 90 degrees, ABD - 60 degrees   Full AAROM/PROM starting at 6 weeks as tolerated   Medical Diagnosis from Referral: M75.101 (ICD-10-CM) - Rotator cuff syndrome, right S46.011A (ICD-10-CM) - Traumatic complete tear of right rotator cuff, initial encounter M25.811 (ICD-10-CM) - Impingement of right shoulder  Evaluation Date: 11/18/2024  Authorization Period Expiration: 12/31/2024  Plan of Care Expiration: 2/14/2025  Progress Note Due: 2/14/2024  Date of Surgery: 11/12/2024  Visit # / Visits authorized: 9/ 21   FOTO: 3/ 3 last issued 1/15/2025     Precautions: Standard and post-op       Time In: 1020  Time Out: 1115  Total Billable Time: 55 minutes    PTA Visit #: 0/5       Subjective     Patient states doing well overall. He saw MD today, said he was happy with the progress and it's going to be a long process for him to regain strength bc of the size of the tears.     He was compliant with home " exercise program.  Response to previous treatment: Some joint and muscle soreness   Functional change: no change reported     Pain: 0/10  Location: R shoulder    Objective      Objective Measures updated at progress report unless specified.     1/15/2024    R shoulder A/PROM  Flexion 155/160 deg (in supine)  ER at 90 passive 60 deg  IR at 90 passive 40 deg          CMS Impairment/Limitation/Restriction for FOTO Shoulder Survey    Therapist reviewed FOTO scores for Santiago De Luna on 1/15/2025.   FOTO documents entered into [employer] - see Media section.    Evaluation: 39%    Current : 48%    Goal: 67%        Treatment     Santiago received the treatments listed below:      therapeutic exercises to develop strength, endurance, ROM, flexibility, posture, and core stabilization for 10 minutes including:  Table slides flexion to 90 deg x 3 min  Pulleys flexion and scaption x 3 min ea  Supine chest press with 1# dowel x 20  +Supine serratus punch with 1# dowel x 20  Supine OH flexion with 1# dowel x 20    Wall walk flexion x 20     manual therapy techniques: Joint mobilizations, Myofacial release, and Soft tissue Mobilization were applied to the: R shoulder for 10 minutes, including:  STM/MFR pecs, deltoids, posterior scap mm's (infra and supraspinatus, UT/LS)  GHJ glides all directions Gr I-III, GHJ distraction GrI-III with gentle oscillations  Gentle PROM of shoulder  ER, flex, abd in scap plane within tolerable and available range as indicated per MD protocol    neuromuscular re-education activities to improve: Balance, Coordination, Kinesthetic, Sense, Proprioception, and Posture for 25 minutes. The following activities were included:  Sidelying flexion with yellow pole 2 x 10   +Sidelying abduction 10x  +Sidelying horizontal abduction 10  Sidelying ER with towel roll 2 x 10    Prone row 2 x 10  Prone shoulder extension 2 x 10   Prone horizontal abd 2 x 10   +Prone scaption 10x    Ball on table circles 20x cw/ccw (green  ball)  Ball on table alphabet (green ball)    therapeutic activities to improve functional performance for 10 minutes, including:  +Ball roll up on wall x 10 (green ball)  +IR walkout x RTB x 10  +ER walkout x YTB x 10      Ice x 10 min to R shoulder -- defer today      Patient Education and Home Exercises       Education provided:   - Patient educated on proper exercise technique.      Written Home Exercises Provided: Pt instructed to continue prior HEP. Exercises were reviewed and Santiago was able to demonstrate them prior to the end of the session.  Santiago demonstrated good  understanding of the education provided. See Electronic Medical Record under Patient Instructions for exercises provided during therapy sessions    Assessment   Good tolerance to treatment today. Progression of AROM in sidelying and prone today with good training effect noted. Added ball on wall roll ups to progress AAROM against gravity with mild difficulty reported but able to complete. Progressed RTC isometrics with walkouts, no c/o pain.       Santiago Is progressing well towards his goals.   Patient prognosis is Good.     Patient will continue to benefit from skilled outpatient physical therapy to address the deficits listed in the problem list box on initial evaluation, provide pt/family education and to maximize pt's level of independence in the home and community environment.     Patient's spiritual, cultural and educational needs considered and pt agreeable to plan of care and goals.     Anticipated barriers to physical therapy: standard, transportation    Goals: updated 1/29/2025   Short Term Goals (4 Weeks):   1. Pt to demonstrate improved PROM by 10% to allow pt to perform self care activities with increased ease. (Not met, progressing)  2. Pt to demonstrate improved flexibility by a half grade to allow improved ADLs with increased ease.  (Not met, progressing)  3. Pt will report <2/10 pain within the R shoulder for ease with  donning/doffing sling. (Not met, progressing)  4. Pt will report being independent with his/her HEP for maintenance of improvements gained during therapy sessions (Not met, progressing)  5. Pt to demonstrate improved functional ability with FOTO score >=48% . (Not met, progressing)     Long Term Goals (12 Weeks):   1. Pt to demonstrate improved ROM to WNL, R=L, to allow pt to perform self care with increased ease. (Not met, progressing)  2. Pt to demonstrate improved flexibility by a full grade to allow improved postural alignment with increased ease. (Not met, progressing)  3. Pt will demonstrate >=4+/5 strength within the R shoulder for ease with house hold chores (Not met, progressing)  4. Pt to demonstrate improved functional ability with FOTO score >=67% . (Not met, progressing)  5. Pt independent with HEP and demonstrates good return technique. (Not met, progressing)    Plan     Cont to progress towards goals set by PT focusing on joint range of motion and scap control.      Felicita Anderson, PT, DPT

## 2025-01-31 ENCOUNTER — OFFICE VISIT (OUTPATIENT)
Dept: OTOLARYNGOLOGY | Facility: CLINIC | Age: 65
End: 2025-01-31
Payer: COMMERCIAL

## 2025-01-31 ENCOUNTER — CLINICAL SUPPORT (OUTPATIENT)
Dept: REHABILITATION | Facility: OTHER | Age: 65
End: 2025-01-31
Payer: COMMERCIAL

## 2025-01-31 DIAGNOSIS — Z98.890 POST-OPERATIVE STATE: Primary | ICD-10-CM

## 2025-01-31 DIAGNOSIS — R42 DIZZINESS: ICD-10-CM

## 2025-01-31 DIAGNOSIS — M25.511 ACUTE POSTOPERATIVE PAIN OF RIGHT SHOULDER: ICD-10-CM

## 2025-01-31 DIAGNOSIS — G89.18 ACUTE POSTOPERATIVE PAIN OF RIGHT SHOULDER: ICD-10-CM

## 2025-01-31 DIAGNOSIS — F41.0 PANIC ATTACK: Primary | ICD-10-CM

## 2025-01-31 PROCEDURE — 99203 OFFICE O/P NEW LOW 30 MIN: CPT | Mod: S$GLB,,, | Performed by: OTOLARYNGOLOGY

## 2025-01-31 PROCEDURE — 97530 THERAPEUTIC ACTIVITIES: CPT | Mod: PN

## 2025-01-31 PROCEDURE — 97110 THERAPEUTIC EXERCISES: CPT | Mod: PN

## 2025-01-31 PROCEDURE — 97140 MANUAL THERAPY 1/> REGIONS: CPT | Mod: PN

## 2025-01-31 PROCEDURE — 97112 NEUROMUSCULAR REEDUCATION: CPT | Mod: PN

## 2025-01-31 PROCEDURE — 99999 PR PBB SHADOW E&M-EST. PATIENT-LVL II: CPT | Mod: PBBFAC,,, | Performed by: OTOLARYNGOLOGY

## 2025-01-31 NOTE — PROGRESS NOTES
Subjective:   Santiago De Luna is a 64 y.o. male who was referred to me in consultation for dizziness. He has a past medical history of Anxiety disorder, unspecified and Male erectile dysfunction, unspecified.  He has had dizziness for years and wanted to know what options are available for management outside of what he has tried.  The sensation is episodic lasting seconds.  He describes it as a floating sensation and notes some spinning.  It is precipitated by driving or walking along bridges, grocery isles, patterned carpet or tile, balconies, or any situation where there is a significant drop off. He reports that if he closes his eyes to stop looking at the trigger, the sensation of dizziness stop. Symptoms only occur when the patient it looking at the trigger. Patient has no history of migraine headaches.  The problem is stable.  There is not associated balance problems.  He denies any associated ear concerns.  He denies a history of migraines. He denies a history of loud noise exposure. He reports a history of head trauma in his teenage years. He denies any recent head trauma or new medications.    Past Medical History  He has a past medical history of Anxiety disorder, unspecified and Male erectile dysfunction, unspecified.    Past Surgical History  He has a past surgical history that includes Hernia repair (09/2017); Anterior cruciate ligament repair (Bilateral); Rotator cuff repair (Bilateral); Rotator cuff repair (Right, 11/12/2024); and Arthroscopic debridement of shoulder (Right, 11/12/2024).    Family History  His family history includes Alcohol abuse in his mother; Aortic aneurysm in his mother; Dementia in his father; Early death in his father and mother; Hearing loss in his father; Heart disease in his father; Obesity in his sister; Prostate cancer in his father; Stroke in his father.    Social History  He reports that he has never smoked. He has never used smokeless tobacco. He reports current alcohol use  of about 28.0 standard drinks of alcohol per week. He reports current drug use. Frequency: 5.00 times per week. Drug: Marijuana.    Allergies  He is allergic to indomethacin, shellfish derived, hydrocodone, hydrocodone-acetaminophen, and mupirocin.    Medications  He has a current medication list which includes the following prescription(s): celecoxib and tamsulosin.    Review of Systems     Constitutional: Negative for appetite change, chills, fatigue, fever and unexpected weight loss.      HENT: Negative for ear discharge, ear infection, ear pain, facial swelling, hearing loss, mouth sores, nosebleeds, postnasal drip, ringing in the ears, runny nose, sinus infection, sinus pressure, sore throat, stuffy nose, tonsil infection, dental problems, trouble swallowing and voice change.      Eyes:  Negative for change in eyesight, eye drainage, eye itching and photophobia.     Respiratory:  Negative for cough, shortness of breath, sleep apnea, snoring and wheezing.      Cardiovascular:  Negative for chest pain, foot swelling, irregular heartbeat and swollen veins.     Gastrointestinal:  Negative for abdominal pain, acid reflux, constipation, diarrhea, heartburn and vomiting.     Genitourinary: Negative for difficulty urinating, sexual problems and frequent urination.     Musc: Positive for aching muscles.     Skin: Negative for rash.     Allergy: Positive for food allergies.     Endocrine: Negative for cold intolerance and heat intolerance.      Neurological: Positive for dizziness.     Hematologic: Negative for bruises/bleeds easily and swollen glands.      Psychiatric: Negative for decreased concentration, depression, nervous/anxious and sleep disturbance.            Constitutional:   He appears well-developed and well-nourished. He appears alert.  Non-toxic appearance. He does not have a sickly appearance. He does not appear ill. Normal speech.      Head:  Normocephalic and atraumatic.     Ears:  Hearing normal to  normal and whispered voice; external ear normal without scars, lesions, or masses; ear canal, tympanic membrane, and middle ear normal..   Right Ear: No drainage, swelling or tenderness. Tympanic membrane is not injected, not scarred, not perforated, not erythematous, not retracted and not bulging. Tympanic membrane mobility is normal. No middle ear effusion.   Left Ear: No drainage, swelling or tenderness. Tympanic membrane is not injected, not scarred, not perforated, not erythematous, not retracted and not bulging. Tympanic membrane mobility is normal.  No middle ear effusion.     Psychiatric:   He has a normal mood and affect. His speech is normal and behavior is normal.     Neurological:   He is alert.     Olga-Hallpike Left: Negative for torsional and up-beating nystagmus    Olga-Hallpike Right: Negative for torsional and up-beating nystagmus    Romberg: Negative      Assessment:     1. Panic attack    2. Dizziness      Plan:   Santiago was seen today for dizziness.    Diagnoses and all orders for this visit:    Panic attack    Dizziness    In summary this is a pleasant 64 y.o. Presents for symptoms panic attacks when he is experiencing heights or other vestibular stimulating environments.    We discussed possible treatments including vestibular therapy or slow exposure therapy.  We also discussed patient's associated anxiety and possible role of SSRIs and SNRIs.  We also discussed cognitive behavioral therapy.      At this time patient felt best just being reassured that does not have a dangerous health condition.

## 2025-01-31 NOTE — PROGRESS NOTES
"[facility]   Physical Therapy Treatment Note      Name: Santiago De Luna  [facility] Number: [id number]    Therapy Diagnosis:   Encounter Diagnoses   Name Primary?    Post-operative state Yes    Acute postoperative pain of right shoulder        Physician: Rivera Fernandes, *    Visit Date: 1/31/2025    Physician Orders: PT Eval and Treat:      Massive size Rotator cuff protocol  Biceps tenodesis was not performed; if tenodesis was performed limit aggressive biceps flexion exercises for first 6 weeks     Start Therapy in 3-5 days      Use Sling and Abduction Pillow - For  6 weeks then discontinue pillow at 6 weeks time to protect rotator cuff repair and biceps tenodesis. Discontinue all immobilization in 6 weeks. Remove arm from sling immediately and move elbow and wrist as tolerated.     Cuff specific program:  Pendulum exercises and Codman's exercises in 3-5 days  protecting rotator cuff repair for 6 weeks by avoiding active motion program until 6 weeks.     PASSIVE ROM: "ER side 30 degrees, Forward Flex 90 degrees, ABD - 60 degrees   Full AAROM/PROM starting at 6 weeks as tolerated   Medical Diagnosis from Referral: M75.101 (ICD-10-CM) - Rotator cuff syndrome, right S46.011A (ICD-10-CM) - Traumatic complete tear of right rotator cuff, initial encounter M25.811 (ICD-10-CM) - Impingement of right shoulder  Evaluation Date: 11/18/2024  Authorization Period Expiration: 12/31/2024  Plan of Care Expiration: 2/14/2025  Progress Note Due: 2/14/2024  Date of Surgery: 11/12/2024  Visit # / Visits authorized: 10/ 21   FOTO: 3/ 3 last issued 1/15/2025     Precautions: Standard and post-op       Time In: 10:00a  Time Out: 10:55a  Total Billable Time: 55 minutes    PTA Visit #: 0/5       Subjective     Patient states: mild soreness on the top of the shoulder after Wednesday's visit. Says that daily pain is significantly better, and that he is able to sleep through the night without pain disturbance.    He " was compliant with home exercise program.  Response to previous treatment: Some joint and muscle soreness   Functional change: no change reported     Pain: 2/10  Location: R shoulder    Objective      Objective Measures updated at progress report unless specified.     1/15/2024    R shoulder A/PROM  Flexion 155/160 deg (in supine)  ER at 90 passive 60 deg  IR at 90 passive 40 deg          CMS Impairment/Limitation/Restriction for FOTO Shoulder Survey    Therapist reviewed FOTO scores for Santiago De Luna on 1/15/2025.   FOTO documents entered into [employer] - see Media section.    Evaluation: 39%    Current : 48%    Goal: 67%        Treatment     Santiago received the treatments listed below:      therapeutic exercises to develop strength, endurance, ROM, flexibility, posture, and core stabilization for 10 minutes including:  Pulleys flexion and scaption x 3 min ea  Supine chest press with 1# dowel x 20  Supine serratus punch with 1# dowel x 20  Supine OH flexion with 1# dowel x 20    manual therapy techniques: Joint mobilizations, Myofacial release, and Soft tissue Mobilization were applied to the: R shoulder for 10 minutes, including:  STM/MFR pecs, deltoids, posterior scap mm's (infra and supraspinatus, UT/LS)  GHJ glides all directions Gr I-III, GHJ distraction GrI-III with gentle oscillations  Gentle PROM of shoulder  ER, flex, abd in scap plane within tolerable and available range as indicated per MD protocol    neuromuscular re-education activities to improve: Balance, Coordination, Kinesthetic, Sense, Proprioception, and Posture for 25 minutes. The following activities were included:  Sidelying flexion with yellow pole 2 x 10   Sidelying abduction 10x  Sidelying horizontal abduction 10  Sidelying ER with towel roll 2 x 10    Prone row 2 x 10  Prone shoulder extension 2 x 10   Prone horizontal abd 2 x 10   Prone scaption 10x    Ball on table circles 20x cw/ccw (green ball)  Ball on table alphabet (green  ball)    therapeutic activities to improve functional performance for 10 minutes, including:  Ball roll up on wall x 10 (green ball)  IR walkout x RTB x 10  ER walkout x YTB x 10      Ice x 10 min to R shoulder -- defer today      Patient Education and Home Exercises       Education provided:   - Patient educated on proper exercise technique.      Written Home Exercises Provided: Pt instructed to continue prior HEP. Exercises were reviewed and Santiago was able to demonstrate them prior to the end of the session.  Santiago demonstrated good  understanding of the education provided. See Electronic Medical Record under Patient Instructions for exercises provided during therapy sessions    Assessment     Pt with excellent GHJ accessory mobility and PROM all directions with normal EF. Continued difficulty with AROM due to continued RTC and scapular strength and stability deficits that limit overall scapulohumeral and scapulothoracic motor control/coordination with OH activities. Good tolerance with overall session but noted quick to fatigue with SL horiz abd and ER walkouts, with mild pain in superior shoulder.     Santiago Is progressing well towards his goals.   Patient prognosis is Good.     Patient will continue to benefit from skilled outpatient physical therapy to address the deficits listed in the problem list box on initial evaluation, provide pt/family education and to maximize pt's level of independence in the home and community environment.     Patient's spiritual, cultural and educational needs considered and pt agreeable to plan of care and goals.     Anticipated barriers to physical therapy: standard, transportation    Goals: updated 1/31/2025   Short Term Goals (4 Weeks):   1. Pt to demonstrate improved PROM by 10% to allow pt to perform self care activities with increased ease. (Not met, progressing)  2. Pt to demonstrate improved flexibility by a half grade to allow improved ADLs with increased ease.  (Not met,  progressing)  3. Pt will report <2/10 pain within the R shoulder for ease with donning/doffing sling. (Not met, progressing)  4. Pt will report being independent with his/her HEP for maintenance of improvements gained during therapy sessions (Not met, progressing)  5. Pt to demonstrate improved functional ability with FOTO score >=48% . (Not met, progressing)     Long Term Goals (12 Weeks):   1. Pt to demonstrate improved ROM to WNL, R=L, to allow pt to perform self care with increased ease. (Not met, progressing)  2. Pt to demonstrate improved flexibility by a full grade to allow improved postural alignment with increased ease. (Not met, progressing)  3. Pt will demonstrate >=4+/5 strength within the R shoulder for ease with house hold chores (Not met, progressing)  4. Pt to demonstrate improved functional ability with FOTO score >=67% . (Not met, progressing)  5. Pt independent with HEP and demonstrates good return technique. (Not met, progressing)    Plan     Cont to progress towards goals set by PT focusing on joint range of motion and scap control.      Jo Ann Mei, PT

## 2025-02-05 ENCOUNTER — CLINICAL SUPPORT (OUTPATIENT)
Dept: REHABILITATION | Facility: OTHER | Age: 65
End: 2025-02-05
Payer: COMMERCIAL

## 2025-02-05 ENCOUNTER — DOCUMENTATION ONLY (OUTPATIENT)
Dept: REHABILITATION | Facility: OTHER | Age: 65
End: 2025-02-05

## 2025-02-05 DIAGNOSIS — Z98.890 POST-OPERATIVE STATE: Primary | ICD-10-CM

## 2025-02-05 DIAGNOSIS — G89.18 ACUTE POSTOPERATIVE PAIN OF RIGHT SHOULDER: ICD-10-CM

## 2025-02-05 DIAGNOSIS — M25.511 ACUTE POSTOPERATIVE PAIN OF RIGHT SHOULDER: ICD-10-CM

## 2025-02-05 PROCEDURE — 97112 NEUROMUSCULAR REEDUCATION: CPT | Mod: PN,CQ

## 2025-02-05 PROCEDURE — 97110 THERAPEUTIC EXERCISES: CPT | Mod: PN,CQ

## 2025-02-05 PROCEDURE — 97530 THERAPEUTIC ACTIVITIES: CPT | Mod: PN,CQ

## 2025-02-05 PROCEDURE — 97140 MANUAL THERAPY 1/> REGIONS: CPT | Mod: PN,CQ

## 2025-02-05 NOTE — PROGRESS NOTES
"[facility]   Physical Therapy Treatment Note      Name: Santiago De Luna  [facility] Number: [id number]    Therapy Diagnosis:   Encounter Diagnoses   Name Primary?    Post-operative state Yes    Acute postoperative pain of right shoulder        Physician: Rivera Fernandes, *    Visit Date: 2/5/2025    Physician Orders: PT Eval and Treat:      Massive size Rotator cuff protocol  Biceps tenodesis was not performed; if tenodesis was performed limit aggressive biceps flexion exercises for first 6 weeks     Start Therapy in 3-5 days      Use Sling and Abduction Pillow - For  6 weeks then discontinue pillow at 6 weeks time to protect rotator cuff repair and biceps tenodesis. Discontinue all immobilization in 6 weeks. Remove arm from sling immediately and move elbow and wrist as tolerated.     Cuff specific program:  Pendulum exercises and Codman's exercises in 3-5 days  protecting rotator cuff repair for 6 weeks by avoiding active motion program until 6 weeks.     PASSIVE ROM: "ER side 30 degrees, Forward Flex 90 degrees, ABD - 60 degrees   Full AAROM/PROM starting at 6 weeks as tolerated   Medical Diagnosis from Referral: M75.101 (ICD-10-CM) - Rotator cuff syndrome, right S46.011A (ICD-10-CM) - Traumatic complete tear of right rotator cuff, initial encounter M25.811 (ICD-10-CM) - Impingement of right shoulder   Evaluation Date: 11/18/2024  Authorization Period Expiration: 12/31/2024  Plan of Care Expiration: 2/14/2025  Progress Note Due: 2/14/2024  Date of Surgery: 11/12/2024  Visit # / Visits authorized: 11/ 21   FOTO: 3/ 3 last issued 1/15/2025     Precautions: Standard and post-op       Time In: 10:20 a  Time Out: 11:08 a  Total Billable Time: 48 minutes     PTA Visit #: 1/5       Subjective     Patient states: noticing that he is improving in motion and endurance. Feels like he is about 80% and is able to cook and reach back in the car. Saw MD and said "everything looks good and is stable and " "ROM looks great and it is all about strength building from here."      He was compliant with home exercise program.   Response to previous treatment: "I felt really good actually. The visit before that left me a little sore."   Functional change: no change reported     Pain: 0/10   Location: R shoulder     Objective      Objective Measures updated at progress report unless specified.     1/15/2024    R shoulder A/PROM  Flexion 155/160 deg (in supine)  ER at 90 passive 60 deg  IR at 90 passive 40 deg          CMS Impairment/Limitation/Restriction for FOTO Shoulder Survey    Therapist reviewed FOTO scores for Santiago De Luna on 1/15/2025.   FOTO documents entered into [employer] - see Media section.    Evaluation: 39%    Current : 48%    Goal: 67%        Treatment     Santiago received the treatments listed below:      therapeutic exercises to develop strength, endurance, ROM, flexibility, posture, and core stabilization for 08 minutes including:  Pulleys flexion and scaption x 3 min ea  Supine chest press with 1# dowel x 20  Supine serratus punch with 1# dowel x 20  Supine OH flexion with 1# dowel x 20    manual therapy techniques: Joint mobilizations, Myofacial release, and Soft tissue Mobilization were applied to the: R shoulder for 08 minutes, including:  STM/MFR pecs, deltoids, posterior scap mm's (infra and supraspinatus, UT/LS)  GHJ glides all directions Gr I-III, GHJ distraction GrI-III with gentle oscillations  Gentle PROM of shoulder  ER, flex, abd in scap plane within tolerable and available range as indicated per MD protocol    neuromuscular re-education activities to improve: Balance, Coordination, Kinesthetic, Sense, Proprioception, and Posture for 24 minutes. The following activities were included:  Sidelying flexion with yellow pole 2 x 10   Sidelying abduction 10x  Sidelying horizontal abduction 10  Sidelying ER with towel roll 2 x 10    Prone row 2 x 10   +Prone abduction x10  Prone shoulder extension 2 x 10 " "  Prone horizontal abd 2 x 10   Prone scaption 10x 5" hold     Ball on table circles 20x cw/ccw (green ball)  Ball on table alphabet (green ball)    therapeutic activities to improve functional performance for 08 minutes, including:  Ball roll up on wall x 10 (green ball)   IR walkout x RTB x 10   ER walkout x YTB x 10   + educated pt on self mobilization of LS and UT with theracane     Ice x 10 min to R shoulder -- defer today    Patient Education and Home Exercises       Education provided:   - Patient educated on proper exercise technique.      Written Home Exercises Provided: Pt instructed to continue prior HEP. Exercises were reviewed and Santiago was able to demonstrate them prior to the end of the session.  Santiago demonstrated good  understanding of the education provided. See Electronic Medical Record under Patient Instructions for exercises provided during therapy sessions    Assessment     Overall good tolerance to treatment with no adverse effects. Passive range of motion in all directions with normal EF. Educated pt on MWM to LS and UT with the theracane, which pt noted good myofascial release. Pt was quick to fatigue with SL abduction and walk outs today ER>IR. Continue to monitor and progress pt as tolerated.     Santiago Is progressing well towards his goals.   Patient prognosis is Good.     Patient will continue to benefit from skilled outpatient physical therapy to address the deficits listed in the problem list box on initial evaluation, provide pt/family education and to maximize pt's level of independence in the home and community environment.     Patient's spiritual, cultural and educational needs considered and pt agreeable to plan of care and goals.     Anticipated barriers to physical therapy: standard, transportation    Goals: updated 2/5/2025   Short Term Goals (4 Weeks):   1. Pt to demonstrate improved PROM by 10% to allow pt to perform self care activities with increased ease. (Not met, " progressing)  2. Pt to demonstrate improved flexibility by a half grade to allow improved ADLs with increased ease.  (Not met, progressing)  3. Pt will report <2/10 pain within the R shoulder for ease with donning/doffing sling. (Not met, progressing)  4. Pt will report being independent with his/her HEP for maintenance of improvements gained during therapy sessions (Not met, progressing)  5. Pt to demonstrate improved functional ability with FOTO score >=48% . (Not met, progressing)     Long Term Goals (12 Weeks):   1. Pt to demonstrate improved ROM to WNL, R=L, to allow pt to perform self care with increased ease. (Not met, progressing)  2. Pt to demonstrate improved flexibility by a full grade to allow improved postural alignment with increased ease. (Not met, progressing)  3. Pt will demonstrate >=4+/5 strength within the R shoulder for ease with house hold chores (Not met, progressing)  4. Pt to demonstrate improved functional ability with FOTO score >=67% . (Not met, progressing)  5. Pt independent with HEP and demonstrates good return technique. (Not met, progressing)    Plan     Cont to progress towards goals set by PT focusing on joint range of motion and scap control.      Lizette Mabry, PTA

## 2025-02-05 NOTE — PROGRESS NOTES
Physical Therapist and Physical Therapist Assistant met face to face to discuss patient's treatment plan and progress towards established goals. Pt will be seen by a physical therapist minimally every 6th visit or every 30 days.    Lizette Mabry PTA  02/05/2025

## 2025-02-07 ENCOUNTER — CLINICAL SUPPORT (OUTPATIENT)
Dept: REHABILITATION | Facility: OTHER | Age: 65
End: 2025-02-07
Payer: COMMERCIAL

## 2025-02-07 DIAGNOSIS — G89.18 ACUTE POSTOPERATIVE PAIN OF RIGHT SHOULDER: ICD-10-CM

## 2025-02-07 DIAGNOSIS — M25.511 ACUTE POSTOPERATIVE PAIN OF RIGHT SHOULDER: ICD-10-CM

## 2025-02-07 DIAGNOSIS — Z98.890 POST-OPERATIVE STATE: Primary | ICD-10-CM

## 2025-02-07 PROCEDURE — 97140 MANUAL THERAPY 1/> REGIONS: CPT | Mod: PN

## 2025-02-07 PROCEDURE — 97112 NEUROMUSCULAR REEDUCATION: CPT | Mod: PN

## 2025-02-07 PROCEDURE — 97110 THERAPEUTIC EXERCISES: CPT | Mod: PN

## 2025-02-07 NOTE — PROGRESS NOTES
"[facility]   Physical Therapy Treatment Note      Name: Santiago De Luna  [facility] Number: [id number]    Therapy Diagnosis:   Encounter Diagnoses   Name Primary?    Post-operative state Yes    Acute postoperative pain of right shoulder        Physician: Rivera Fernandes, *    Visit Date: 2/7/2025    Physician Orders: PT Eval and Treat:      Massive size Rotator cuff protocol  Biceps tenodesis was not performed; if tenodesis was performed limit aggressive biceps flexion exercises for first 6 weeks     Start Therapy in 3-5 days      Use Sling and Abduction Pillow - For  6 weeks then discontinue pillow at 6 weeks time to protect rotator cuff repair and biceps tenodesis. Discontinue all immobilization in 6 weeks. Remove arm from sling immediately and move elbow and wrist as tolerated.     Cuff specific program:  Pendulum exercises and Codman's exercises in 3-5 days  protecting rotator cuff repair for 6 weeks by avoiding active motion program until 6 weeks.     PASSIVE ROM: "ER side 30 degrees, Forward Flex 90 degrees, ABD - 60 degrees   Full AAROM/PROM starting at 6 weeks as tolerated   Medical Diagnosis from Referral: M75.101 (ICD-10-CM) - Rotator cuff syndrome, right S46.011A (ICD-10-CM) - Traumatic complete tear of right rotator cuff, initial encounter M25.811 (ICD-10-CM) - Impingement of right shoulder   Evaluation Date: 11/18/2024  Authorization Period Expiration: 12/31/2024  Plan of Care Expiration: 2/14/2025  Progress Note Due: 2/14/2024  Date of Surgery: 11/12/2024  Visit # / Visits authorized: 12/ 21   FOTO: 3/ 3 last issued 1/15/2025     Precautions: Standard and post-op       Time In: 10:00 a  Time Out: 10:53 a  Total Billable Time: 53 minutes     PTA Visit #: 1/5       Subjective     Patient states: mild stiffness and slight impingement pain in superior shoulder with endrange elevation actively, but overall feeling good. Can do most ADLs below shoulder height without difficulty or " "pain, but cont to avoid mod to heavy lifting per MD protocol.    He was compliant with home exercise program.   Response to previous treatment: "I felt really good actually. The visit before that left me a little sore."   Functional change: no change reported     Pain: 0/10   Location: R shoulder     Objective      Objective Measures updated at progress report unless specified.     2/7/2025    R shoulder A/PROM  Flexion 160/170 deg (in supine)  ER at 90 passive 70 deg  IR at 90 passive 50 deg          CMS Impairment/Limitation/Restriction for FOTO Shoulder Survey    Therapist reviewed FOTO scores for Santiago De Luna on 1/15/2025.   FOTO documents entered into [employer] - see Media section.    Evaluation: 39%    Current : 48%    Goal: 67%        Treatment     Santiago received the treatments listed below:      therapeutic exercises to develop strength, endurance, ROM, flexibility, posture, and core stabilization for 08 minutes including:  Pulleys flexion and scaption x 3 min ea  Supine chest press with 1# dowel x 20  Supine serratus punch with 1# dowel x 20  Supine OH flexion with 1# dowel x 20    manual therapy techniques: Joint mobilizations, Myofacial release, and Soft tissue Mobilization were applied to the: R shoulder for 10 minutes, including:  STM/MFR pecs, deltoids, posterior scap mm's (infra and supraspinatus, UT/LS)  GHJ glides all directions Gr I-III, GHJ distraction GrI-III with gentle oscillations  Gentle PROM of shoulder  ER, flex, abd in scap plane within tolerable and available range as indicated per MD protocol    neuromuscular re-education activities to improve: Balance, Coordination, Kinesthetic, Sense, Proprioception, and Posture for 29 minutes. The following activities were included:  Sidelying flexion with yellow pole 2 x 10   Sidelying abduction 10x  Sidelying horizontal abduction x10  Sidelying ER with towel roll 2 x 10    Prone row 2 x 10   Prone abduction x10  Prone shoulder extension 2 x 10   Prone " "horizontal abd 2 x 10   Prone scaption 10x 5" hold     Ball on table circles 20x cw/ccw (green ball)  Ball on table alphabet (green ball)    therapeutic activities to improve functional performance for 06 minutes, including:  Ball roll up on wall x 10 (green ball)   IR walkout x RTB x 10   ER walkout x YTB x 10     Ice x 10 min to R shoulder -- defer today    Patient Education and Home Exercises       Education provided:   - Patient educated on proper exercise technique.      Written Home Exercises Provided: Pt instructed to continue prior HEP. Exercises were reviewed and Santiago was able to demonstrate them prior to the end of the session.  Santiago demonstrated good  understanding of the education provided. See Electronic Medical Record under Patient Instructions for exercises provided during therapy sessions    Assessment     Overall good tolerance to treatment with no adverse effects. Passive range of motion in all directions with normal EF. Educated pt on MWM to LS and UT with the theracane, which pt noted good myofascial release. Pt was quick to fatigue with SL abduction and walk outs today ER>IR. Continue to monitor and progress pt as tolerated.     Santiago Is progressing well towards his goals.   Patient prognosis is Good.     Patient will continue to benefit from skilled outpatient physical therapy to address the deficits listed in the problem list box on initial evaluation, provide pt/family education and to maximize pt's level of independence in the home and community environment.     Patient's spiritual, cultural and educational needs considered and pt agreeable to plan of care and goals.     Anticipated barriers to physical therapy: standard, transportation    Goals: updated 2/12/2025   Short Term Goals (4 Weeks):   1. Pt to demonstrate improved PROM by 10% to allow pt to perform self care activities with increased ease. (Not met, progressing)  2. Pt to demonstrate improved flexibility by a half grade to allow " improved ADLs with increased ease.  (Not met, progressing)  3. Pt will report <2/10 pain within the R shoulder for ease with donning/doffing sling. (Not met, progressing)  4. Pt will report being independent with his/her HEP for maintenance of improvements gained during therapy sessions (Not met, progressing)  5. Pt to demonstrate improved functional ability with FOTO score >=48% . (Not met, progressing)     Long Term Goals (12 Weeks):   1. Pt to demonstrate improved ROM to WNL, R=L, to allow pt to perform self care with increased ease. (Not met, progressing)  2. Pt to demonstrate improved flexibility by a full grade to allow improved postural alignment with increased ease. (Not met, progressing)  3. Pt will demonstrate >=4+/5 strength within the R shoulder for ease with house hold chores (Not met, progressing)  4. Pt to demonstrate improved functional ability with FOTO score >=67% . (Not met, progressing)  5. Pt independent with HEP and demonstrates good return technique. (Not met, progressing)    Plan     Cont to progress towards goals set by PT focusing on joint range of motion and scap control.      Jo Ann Mei, PT

## 2025-02-11 DIAGNOSIS — N52.9 ERECTILE DYSFUNCTION, UNSPECIFIED ERECTILE DYSFUNCTION TYPE: Primary | ICD-10-CM

## 2025-02-11 RX ORDER — TADALAFIL 5 MG/1
5 TABLET ORAL DAILY PRN
Qty: 30 TABLET | Refills: 11 | Status: SHIPPED | OUTPATIENT
Start: 2025-02-11 | End: 2026-02-11

## 2025-02-12 ENCOUNTER — CLINICAL SUPPORT (OUTPATIENT)
Dept: REHABILITATION | Facility: OTHER | Age: 65
End: 2025-02-12
Payer: COMMERCIAL

## 2025-02-12 DIAGNOSIS — Z98.890 POST-OPERATIVE STATE: Primary | ICD-10-CM

## 2025-02-12 DIAGNOSIS — M25.511 ACUTE POSTOPERATIVE PAIN OF RIGHT SHOULDER: ICD-10-CM

## 2025-02-12 DIAGNOSIS — G89.18 ACUTE POSTOPERATIVE PAIN OF RIGHT SHOULDER: ICD-10-CM

## 2025-02-12 PROCEDURE — 97112 NEUROMUSCULAR REEDUCATION: CPT | Mod: PN,CQ

## 2025-02-12 PROCEDURE — 97110 THERAPEUTIC EXERCISES: CPT | Mod: PN,CQ

## 2025-02-12 PROCEDURE — 97140 MANUAL THERAPY 1/> REGIONS: CPT | Mod: PN,CQ

## 2025-02-12 PROCEDURE — 97530 THERAPEUTIC ACTIVITIES: CPT | Mod: PN,CQ

## 2025-02-12 NOTE — PROGRESS NOTES
"[facility]   Physical Therapy Treatment Note      Name: Santiago De Luna  [facility] Number: [id number]    Therapy Diagnosis:   Encounter Diagnoses   Name Primary?    Post-operative state Yes    Acute postoperative pain of right shoulder        Physician: Rivera Fernandes, *    Visit Date: 2/12/2025    Physician Orders: PT Eval and Treat:      Massive size Rotator cuff protocol  Biceps tenodesis was not performed; if tenodesis was performed limit aggressive biceps flexion exercises for first 6 weeks     Start Therapy in 3-5 days      Use Sling and Abduction Pillow - For  6 weeks then discontinue pillow at 6 weeks time to protect rotator cuff repair and biceps tenodesis. Discontinue all immobilization in 6 weeks. Remove arm from sling immediately and move elbow and wrist as tolerated.     Cuff specific program:  Pendulum exercises and Codman's exercises in 3-5 days  protecting rotator cuff repair for 6 weeks by avoiding active motion program until 6 weeks.     PASSIVE ROM: "ER side 30 degrees, Forward Flex 90 degrees, ABD - 60 degrees   Full AAROM/PROM starting at 6 weeks as tolerated   Medical Diagnosis from Referral: M75.101 (ICD-10-CM) - Rotator cuff syndrome, right S46.011A (ICD-10-CM) - Traumatic complete tear of right rotator cuff, initial encounter M25.811 (ICD-10-CM) - Impingement of right shoulder   Evaluation Date: 11/18/2024  Authorization Period Expiration: 12/31/2024  Plan of Care Expiration: 2/14/2025  Progress Note Due: 2/14/2024  Date of Surgery: 11/12/2024  Visit # / Visits authorized: 13/ 21   FOTO: 3/ 3 last issued 1/15/2025      Precautions: Standard and post-op      Time In: 10:15 a   Time Out: 11:05 a   Total Billable Time: 50 minutes     PTA Visit #: 1/5       Subjective     Patient states: he is feeling stronger today and had no problems throughout the weekend and Superbowl festivities. States he can almost reach as high as the other arm.     He was compliant with home " "exercise program.   Response to previous treatment: "good"   Functional change: no change reported     Pain: 0/10   Location: R shoulder     Objective      Objective Measures updated at progress report unless specified.     2/7/2025    R shoulder A/PROM   Flexion 160/170 deg (in supine)  ER at 90 passive 70 deg  IR at 90 passive 50 deg    CMS Impairment/Limitation/Restriction for FOTO Shoulder Survey    Therapist reviewed FOTO scores for Santiago De Luna on 1/15/2025.   FOTO documents entered into [employer] - see Media section.    Evaluation: 39%    Current : 48%    Goal: 67%        Treatment     Santiago received the treatments listed below:      therapeutic exercises to develop strength, endurance, ROM, flexibility, posture, and core stabilization for 08 minutes including:  Pulleys flexion and scaption x 3 min ea  Supine chest press with 1# dowel x 30  Supine serratus punch with 1# dowel x 20  Supine OH flexion with 1# dowel x 20    manual therapy techniques: Joint mobilizations, Myofacial release, and Soft tissue Mobilization were applied to the: R shoulder for 08 minutes, including:  STM/MFR pecs, deltoids, posterior scap mm's (infra and supraspinatus, UT/LS)  GHJ glides all directions Gr I-III, GHJ distraction GrI-III with gentle oscillations  Gentle PROM of shoulder  ER, flex, abd in scap plane within tolerable and available range as indicated per MD protocol    neuromuscular re-education activities to improve: Balance, Coordination, Kinesthetic, Sense, Proprioception, and Posture for 24 minutes. The following activities were included:  Sidelying flexion with yellow pole 2 x 10   +Sidelying flexion x10   Sidelying abduction 10x  Sidelying horizontal abduction x10   Sidelying ER with towel roll 2 x 10     Prone row 2 x 10   Prone abduction x10   Prone shoulder extension 2 x 10   Prone horizontal abd 2 x 10   Prone scaption 10x 5" hold     Ball on table circles 20x cw/ccw (green ball)  Ball on table alphabet (green ball)   + " "supine D2 pattern (no resistance) x 10     therapeutic activities to improve functional performance for 10 minutes, including:  Ball roll up on wall 2 x 10 (green ball)   IR walkout x RTB x 10   ER walkout x YTB x 10     Ice x 10 min to R shoulder -- defer today     Patient Education and Home Exercises       Education provided:   - Patient educated on proper exercise technique.      Written Home Exercises Provided: Pt instructed to continue prior HEP. Exercises were reviewed and Santiago was able to demonstrate them prior to the end of the session.  Santiago demonstrated good  understanding of the education provided. See Electronic Medical Record under Patient Instructions for exercises provided during therapy sessions    Assessment     Overall good tolerance to treatment with no adverse effects. Passive range of motion in all directions with normal EF. Pt noted minimal pain in flexion and abduction today, but did not limit stretch. Initiated supine D2 PNF pattern today according to MD protocol. Pt reported "clicking" with initial movement, but dissipated after the second rep. Pt continues to have good training effects and mm fatigue without increase in pain with walk outs. Continue to monitor and progress pt as tolerated.     Santiago Is progressing well towards his goals.   Patient prognosis is Good.     Patient will continue to benefit from skilled outpatient physical therapy to address the deficits listed in the problem list box on initial evaluation, provide pt/family education and to maximize pt's level of independence in the home and community environment.     Patient's spiritual, cultural and educational needs considered and pt agreeable to plan of care and goals.     Anticipated barriers to physical therapy: standard, transportation    Goals: updated 2/12/2025   Short Term Goals (4 Weeks):   1. Pt to demonstrate improved PROM by 10% to allow pt to perform self care activities with increased ease. (Not met, " progressing)  2. Pt to demonstrate improved flexibility by a half grade to allow improved ADLs with increased ease.  (Not met, progressing)  3. Pt will report <2/10 pain within the R shoulder for ease with donning/doffing sling. (Not met, progressing)  4. Pt will report being independent with his/her HEP for maintenance of improvements gained during therapy sessions (Not met, progressing)  5. Pt to demonstrate improved functional ability with FOTO score >=48% . (Not met, progressing)     Long Term Goals (12 Weeks):   1. Pt to demonstrate improved ROM to WNL, R=L, to allow pt to perform self care with increased ease. (Not met, progressing)  2. Pt to demonstrate improved flexibility by a full grade to allow improved postural alignment with increased ease. (Not met, progressing)  3. Pt will demonstrate >=4+/5 strength within the R shoulder for ease with house hold chores (Not met, progressing)  4. Pt to demonstrate improved functional ability with FOTO score >=67% . (Not met, progressing)  5. Pt independent with HEP and demonstrates good return technique. (Not met, progressing)    Plan     Cont to progress towards goals set by PT focusing on joint range of motion and scap control.      Lizette Mabry, PTA

## 2025-02-14 ENCOUNTER — CLINICAL SUPPORT (OUTPATIENT)
Dept: REHABILITATION | Facility: OTHER | Age: 65
End: 2025-02-14
Payer: COMMERCIAL

## 2025-02-14 DIAGNOSIS — Z98.890 POST-OPERATIVE STATE: Primary | ICD-10-CM

## 2025-02-14 DIAGNOSIS — G89.18 ACUTE POSTOPERATIVE PAIN OF RIGHT SHOULDER: ICD-10-CM

## 2025-02-14 DIAGNOSIS — M25.511 ACUTE POSTOPERATIVE PAIN OF RIGHT SHOULDER: ICD-10-CM

## 2025-02-14 PROCEDURE — 97530 THERAPEUTIC ACTIVITIES: CPT | Mod: PN

## 2025-02-14 PROCEDURE — 97112 NEUROMUSCULAR REEDUCATION: CPT | Mod: PN

## 2025-02-14 PROCEDURE — 97110 THERAPEUTIC EXERCISES: CPT | Mod: PN

## 2025-02-19 ENCOUNTER — CLINICAL SUPPORT (OUTPATIENT)
Dept: REHABILITATION | Facility: OTHER | Age: 65
End: 2025-02-19
Payer: COMMERCIAL

## 2025-02-19 DIAGNOSIS — M25.511 ACUTE POSTOPERATIVE PAIN OF RIGHT SHOULDER: ICD-10-CM

## 2025-02-19 DIAGNOSIS — Z98.890 POST-OPERATIVE STATE: Primary | ICD-10-CM

## 2025-02-19 DIAGNOSIS — G89.18 ACUTE POSTOPERATIVE PAIN OF RIGHT SHOULDER: ICD-10-CM

## 2025-02-19 PROCEDURE — 97140 MANUAL THERAPY 1/> REGIONS: CPT | Mod: PN,CQ

## 2025-02-19 PROCEDURE — 97110 THERAPEUTIC EXERCISES: CPT | Mod: PN,CQ

## 2025-02-19 PROCEDURE — 97112 NEUROMUSCULAR REEDUCATION: CPT | Mod: PN,CQ

## 2025-02-19 PROCEDURE — 97530 THERAPEUTIC ACTIVITIES: CPT | Mod: PN,CQ

## 2025-02-19 NOTE — PROGRESS NOTES
"[facility]   Physical Therapy Treatment Note      Name: Santiago De Luna  [facility] Number: [id number]    Therapy Diagnosis:   Encounter Diagnoses   Name Primary?    Post-operative state Yes    Acute postoperative pain of right shoulder        Physician: Rivera Fernandes, *    Visit Date: 2/19/2025    Physician Orders: PT Eval and Treat:      Massive size Rotator cuff protocol  Biceps tenodesis was not performed; if tenodesis was performed limit aggressive biceps flexion exercises for first 6 weeks     Start Therapy in 3-5 days      Use Sling and Abduction Pillow - For  6 weeks then discontinue pillow at 6 weeks time to protect rotator cuff repair and biceps tenodesis. Discontinue all immobilization in 6 weeks. Remove arm from sling immediately and move elbow and wrist as tolerated.     Cuff specific program:  Pendulum exercises and Codman's exercises in 3-5 days  protecting rotator cuff repair for 6 weeks by avoiding active motion program until 6 weeks.     PASSIVE ROM: "ER side 30 degrees, Forward Flex 90 degrees, ABD - 60 degrees   Full AAROM/PROM starting at 6 weeks as tolerated   Medical Diagnosis from Referral: M75.101 (ICD-10-CM) - Rotator cuff syndrome, right S46.011A (ICD-10-CM) - Traumatic complete tear of right rotator cuff, initial encounter M25.811 (ICD-10-CM) - Impingement of right shoulder   Evaluation Date: 11/18/2024  Authorization Period Expiration: 12/31/2024  Plan of Care Expiration: 2/14/2025  Progress Note Due: 2/14/2024  Date of Surgery: 11/12/2024  Visit # / Visits authorized: 15/ 21   FOTO: 3/ 3 last issued 1/15/2025      Precautions: Standard and post-op      Time In: 10:00 a   Time Out: 10:55 a   Total Billable Time: 55 minutes     PTA Visit #: 1/5       Subjective     Patient states: feeling good with no new issues or concerns.     He was compliant with home exercise program.   Response to previous treatment: "good"   Functional change: no change reported     Pain: " "0/10   Location: R shoulder     Objective      Objective Measures updated at progress report unless specified.     2/7/2025    R shoulder A/PROM   Flexion 160/170 deg (in supine)  ER at 90 passive 70 deg  IR at 90 passive 50 deg    CMS Impairment/Limitation/Restriction for FOTO Shoulder Survey    Therapist reviewed FOTO scores for Santiago De Luna on 1/15/2025.   FOTO documents entered into [email or web address] - see Media section.    Evaluation: 39%    Current : 48%    Goal: 67%        Treatment     Santiago received the treatments listed below:      therapeutic exercises to develop strength, endurance, ROM, flexibility, posture, and core stabilization for 08 minutes including:  Pulleys flexion and scaption x 3 min ea   Supine chest press with 1# dowel x 30   Supine serratus punch with 1# dowel x 30   Supine OH flexion with 1# dowel x 30     manual therapy techniques: Joint mobilizations, Myofacial release, and Soft tissue Mobilization were applied to the: R shoulder for 08 minutes, including:  STM/MFR pecs, deltoids, posterior scap mm's (infra and supraspinatus, UT/LS)  GHJ glides all directions Gr I-III, GHJ distraction GrI-III with gentle oscillations   Gentle PROM of shoulder  ER, flex, abd in scap plane within tolerable and available range as indicated per MD protocol    neuromuscular re-education activities to improve: Balance, Coordination, Kinesthetic, Sense, Proprioception, and Posture for 25 minutes. The following activities were included:  Sidelying flexion with yellow pole 2 x 10   Sidelying flexion x10   Sidelying abduction 10x   Sidelying horizontal abduction x10   Sidelying ER with towel roll 2 x 10     Prone row 2 x 10   Prone abduction x10   Prone shoulder extension 2 x 10   Prone horizontal abd 2 x 10   Prone scaption 10x 5" hold     Ball on table circles 20x cw/ccw (green ball) >> progressed to wall today with blue ball at 80 deg flexion x 10 cw/ccw   Ball on table alphabet (green ball) >> progressed to wall today with " "blue ball at 80 deg flexion x 1 (rest break at M)   supine D2 pattern (no resistance) x 10     SL sleeper stretch x10x5" holds   IR strap stretch x 5x10" holds     +serratus pillowcase wall slide x 2x5 - mod difficulty >> modified to plantigrade against wall x 10     therapeutic activities to improve functional performance for 14 minutes, including:  Ball roll up on wall 2 x 10 (green ball)   IR walkout x RTB x 10 (double handle)   ER walkout x YTB x 10 (double handle)   +standing resisted rows x2x10 x GTB   +standing resisted SALPD x2x10 x YTB     Ice x 10 min to R shoulder -- defer today     Patient Education and Home Exercises       Education provided:   - Patient educated on proper exercise technique.      Written Home Exercises Provided: Pt instructed to continue prior HEP. Exercises were reviewed and Santiago was able to demonstrate them prior to the end of the session.  Santiago demonstrated good  understanding of the education provided. See Electronic Medical Record under Patient Instructions for exercises provided during therapy sessions    Assessment     Overall good tolerance with no adverse effects. Continued scapular stabilization and periscapular strengthening, which pt had good training effects. Progressed CKC serratus anterior and deltoid strengthening per MD protocol. Mod difficulty with pillowcase wall slides and continued without pillowcase. Required heavy verbal and tactile cues for target mm recruitment, but pt continued to have UT substitutions. Modified to plantigrade against wall, which pt was able to perform with good form. Continue to monitor and progress pt as tolerated.     Pt progressing well per MD protocol.   Santiago Is progressing well towards his goals.   Patient prognosis is Good.     Patient will continue to benefit from skilled outpatient physical therapy to address the deficits listed in the problem list box on initial evaluation, provide pt/family education and to maximize pt's level of " independence in the home and community environment.     Patient's spiritual, cultural and educational needs considered and pt agreeable to plan of care and goals.     Anticipated barriers to physical therapy: standard, transportation    Goals: updated 2/19/2025   Short Term Goals (4 Weeks):   1. Pt to demonstrate improved PROM by 10% to allow pt to perform self care activities with increased ease. (Not met, progressing)  2. Pt to demonstrate improved flexibility by a half grade to allow improved ADLs with increased ease.  (Not met, progressing)  3. Pt will report <2/10 pain within the R shoulder for ease with donning/doffing sling. (Not met, progressing)  4. Pt will report being independent with his/her HEP for maintenance of improvements gained during therapy sessions (Not met, progressing)  5. Pt to demonstrate improved functional ability with FOTO score >=48% . (Not met, progressing)     Long Term Goals (12 Weeks):   1. Pt to demonstrate improved ROM to WNL, R=L, to allow pt to perform self care with increased ease. (Not met, progressing)  2. Pt to demonstrate improved flexibility by a full grade to allow improved postural alignment with increased ease. (Not met, progressing)  3. Pt will demonstrate >=4+/5 strength within the R shoulder for ease with house hold chores (Not met, progressing)  4. Pt to demonstrate improved functional ability with FOTO score >=67% . (Not met, progressing)  5. Pt independent with HEP and demonstrates good return technique. (Not met, progressing)    Plan     Cont to progress towards goals set by PT focusing on joint range of motion and scap control.      Lizette Mabry, PTA

## 2025-02-19 NOTE — PROGRESS NOTES
"[facility]   Physical Therapy Treatment Note      Name: Santiago De Luna  [facility] Number: [id number]    Therapy Diagnosis:   Encounter Diagnoses   Name Primary?    Post-operative state Yes    Acute postoperative pain of right shoulder        Physician: Rivera Fernandes, *    Visit Date: 2/14/2025    Physician Orders: PT Eval and Treat:      Massive size Rotator cuff protocol  Biceps tenodesis was not performed; if tenodesis was performed limit aggressive biceps flexion exercises for first 6 weeks     Start Therapy in 3-5 days      Use Sling and Abduction Pillow - For  6 weeks then discontinue pillow at 6 weeks time to protect rotator cuff repair and biceps tenodesis. Discontinue all immobilization in 6 weeks. Remove arm from sling immediately and move elbow and wrist as tolerated.     Cuff specific program:  Pendulum exercises and Codman's exercises in 3-5 days  protecting rotator cuff repair for 6 weeks by avoiding active motion program until 6 weeks.     PASSIVE ROM: "ER side 30 degrees, Forward Flex 90 degrees, ABD - 60 degrees   Full AAROM/PROM starting at 6 weeks as tolerated   Medical Diagnosis from Referral: M75.101 (ICD-10-CM) - Rotator cuff syndrome, right S46.011A (ICD-10-CM) - Traumatic complete tear of right rotator cuff, initial encounter M25.811 (ICD-10-CM) - Impingement of right shoulder   Evaluation Date: 11/18/2024  Authorization Period Expiration: 12/31/2024  Plan of Care Expiration: 2/14/2025  Progress Note Due: 2/14/2024  Date of Surgery: 11/12/2024  Visit # / Visits authorized: 14/ 21   FOTO: 3/ 3 last issued 1/15/2025      Precautions: Standard and post-op      Time In: 10:00 a   Time Out: 11:00 a   Total Billable Time: 60 minutes     PTA Visit #: 1/5       Subjective     Patient states: he is feeling stronger today and had no problems throughout the weekend and Superbowl festivities. States he can almost reach as high as the other arm.     He was compliant with home " "exercise program.   Response to previous treatment: "good"   Functional change: no change reported     Pain: 0/10   Location: R shoulder     Objective      Objective Measures updated at progress report unless specified.     2/7/2025    R shoulder A/PROM   Flexion 160/170 deg (in supine)  ER at 90 passive 70 deg  IR at 90 passive 50 deg    CMS Impairment/Limitation/Restriction for FOTO Shoulder Survey    Therapist reviewed FOTO scores for Santiago De Luna on 1/15/2025.   FOTO documents entered into [employer] - see Media section.    Evaluation: 39%    Current : 48%    Goal: 67%        Treatment     Santiago received the treatments listed below:      therapeutic exercises to develop strength, endurance, ROM, flexibility, posture, and core stabilization for 08 minutes including:  Pulleys flexion and scaption x 3 min ea  Supine chest press with 1# dowel x 30  Supine serratus punch with 1# dowel x 20  Supine OH flexion with 1# dowel x 20    manual therapy techniques: Joint mobilizations, Myofacial release, and Soft tissue Mobilization were applied to the: R shoulder for 08 minutes, including:  STM/MFR pecs, deltoids, posterior scap mm's (infra and supraspinatus, UT/LS)  GHJ glides all directions Gr I-III, GHJ distraction GrI-III with gentle oscillations  Gentle PROM of shoulder  ER, flex, abd in scap plane within tolerable and available range as indicated per MD protocol    neuromuscular re-education activities to improve: Balance, Coordination, Kinesthetic, Sense, Proprioception, and Posture for 29 minutes. The following activities were included:  Sidelying flexion with yellow pole 2 x 10   Sidelying flexion x10   Sidelying abduction 10x  Sidelying horizontal abduction x10   Sidelying ER with towel roll 2 x 10     Prone row 2 x 10   Prone abduction x10   Prone shoulder extension 2 x 10   Prone horizontal abd 2 x 10   Prone scaption 10x 5" hold     Ball on table circles 20x cw/ccw (green ball)  Ball on table alphabet (green ball) " "  supine D2 pattern (no resistance) x 10     +SL sleeper stretch x10x5" holds  +IR strap stretch x 5x10" holds    therapeutic activities to improve functional performance for 15 minutes, including:  Ball roll up on wall 2 x 10 (green ball)   IR walkout x RTB x 10   ER walkout x YTB x 10   +standing resisted rows x2x10 x GTB    Ice x 10 min to R shoulder -- defer today     Patient Education and Home Exercises       Education provided:   - Patient educated on proper exercise technique.      Written Home Exercises Provided: Pt instructed to continue prior HEP. Exercises were reviewed and Santiago was able to demonstrate them prior to the end of the session.  Santiago demonstrated good  understanding of the education provided. See Electronic Medical Record under Patient Instructions for exercises provided during therapy sessions    Assessment     Progressed IR flexibility exercises with good tolerance. Initiated standing resisted rows to continue promoting of scapular stabilization with OH activities.  Pt progressing well per MD protocol.   Santiago Is progressing well towards his goals.   Patient prognosis is Good.     Patient will continue to benefit from skilled outpatient physical therapy to address the deficits listed in the problem list box on initial evaluation, provide pt/family education and to maximize pt's level of independence in the home and community environment.     Patient's spiritual, cultural and educational needs considered and pt agreeable to plan of care and goals.     Anticipated barriers to physical therapy: standard, transportation    Goals: updated 2/19/2025   Short Term Goals (4 Weeks):   1. Pt to demonstrate improved PROM by 10% to allow pt to perform self care activities with increased ease. (Not met, progressing)  2. Pt to demonstrate improved flexibility by a half grade to allow improved ADLs with increased ease.  (Not met, progressing)  3. Pt will report <2/10 pain within the R shoulder for ease " with donning/doffing sling. (Not met, progressing)  4. Pt will report being independent with his/her HEP for maintenance of improvements gained during therapy sessions (Not met, progressing)  5. Pt to demonstrate improved functional ability with FOTO score >=48% . (Not met, progressing)     Long Term Goals (12 Weeks):   1. Pt to demonstrate improved ROM to WNL, R=L, to allow pt to perform self care with increased ease. (Not met, progressing)  2. Pt to demonstrate improved flexibility by a full grade to allow improved postural alignment with increased ease. (Not met, progressing)  3. Pt will demonstrate >=4+/5 strength within the R shoulder for ease with house hold chores (Not met, progressing)  4. Pt to demonstrate improved functional ability with FOTO score >=67% . (Not met, progressing)  5. Pt independent with HEP and demonstrates good return technique. (Not met, progressing)    Plan     Cont to progress towards goals set by PT focusing on joint range of motion and scap control.      Jo Ann Mei, PT

## 2025-02-24 ENCOUNTER — PATIENT MESSAGE (OUTPATIENT)
Dept: UROLOGY | Facility: CLINIC | Age: 65
End: 2025-02-24
Payer: COMMERCIAL

## 2025-02-25 DIAGNOSIS — N52.9 ERECTILE DYSFUNCTION, UNSPECIFIED ERECTILE DYSFUNCTION TYPE: ICD-10-CM

## 2025-02-26 ENCOUNTER — CLINICAL SUPPORT (OUTPATIENT)
Dept: REHABILITATION | Facility: OTHER | Age: 65
End: 2025-02-26
Payer: COMMERCIAL

## 2025-02-26 DIAGNOSIS — M25.511 ACUTE POSTOPERATIVE PAIN OF RIGHT SHOULDER: ICD-10-CM

## 2025-02-26 DIAGNOSIS — G89.18 ACUTE POSTOPERATIVE PAIN OF RIGHT SHOULDER: ICD-10-CM

## 2025-02-26 DIAGNOSIS — Z98.890 POST-OPERATIVE STATE: Primary | ICD-10-CM

## 2025-02-26 PROCEDURE — 97530 THERAPEUTIC ACTIVITIES: CPT | Mod: PN,CQ

## 2025-02-26 PROCEDURE — 97110 THERAPEUTIC EXERCISES: CPT | Mod: PN,CQ

## 2025-02-26 PROCEDURE — 97112 NEUROMUSCULAR REEDUCATION: CPT | Mod: PN,CQ

## 2025-02-26 PROCEDURE — 97140 MANUAL THERAPY 1/> REGIONS: CPT | Mod: PN,CQ

## 2025-02-26 RX ORDER — TADALAFIL 5 MG/1
5 TABLET ORAL DAILY PRN
Qty: 30 TABLET | Refills: 11 | OUTPATIENT
Start: 2025-02-26 | End: 2026-02-26

## 2025-02-26 NOTE — PROGRESS NOTES
"[facility]   Physical Therapy Treatment Note      Name: Santiago De Luna  [facility] Number: [id number]    Therapy Diagnosis:   Encounter Diagnoses   Name Primary?    Post-operative state Yes    Acute postoperative pain of right shoulder          Physician: Rivera Fernandes, *    Visit Date: 2/26/2025    Physician Orders: PT Eval and Treat:      Massive size Rotator cuff protocol  Biceps tenodesis was not performed; if tenodesis was performed limit aggressive biceps flexion exercises for first 6 weeks     Start Therapy in 3-5 days      Use Sling and Abduction Pillow - For  6 weeks then discontinue pillow at 6 weeks time to protect rotator cuff repair and biceps tenodesis. Discontinue all immobilization in 6 weeks. Remove arm from sling immediately and move elbow and wrist as tolerated.     Cuff specific program:  Pendulum exercises and Codman's exercises in 3-5 days  protecting rotator cuff repair for 6 weeks by avoiding active motion program until 6 weeks.     PASSIVE ROM: "ER side 30 degrees, Forward Flex 90 degrees, ABD - 60 degrees   Full AAROM/PROM starting at 6 weeks as tolerated   Medical Diagnosis from Referral: M75.101 (ICD-10-CM) - Rotator cuff syndrome, right S46.011A (ICD-10-CM) - Traumatic complete tear of right rotator cuff, initial encounter M25.811 (ICD-10-CM) - Impingement of right shoulder   Evaluation Date: 11/18/2024  Authorization Period Expiration: 12/31/2024  Plan of Care Expiration: 2/14/2025  Progress Note Due: 2/14/2024  Date of Surgery: 11/12/2024  Visit # / Visits authorized: 17/ 21   FOTO: 3/ 3 last issued 1/15/2025      Precautions: Standard and post-op      Time In: 10:15 a   Time Out: 11:10 a   Total Billable Time: 55 minutes     PTA Visit #: 2/5       Subjective     Patient states: some DOMS from last visit, but only lasted a day. Good recovery the day after that.     He was compliant with home exercise program.   Response to previous treatment: "good, " "soreness"   Functional change: no change reported     Pain: 0/10   Location: R shoulder     Objective      Objective Measures updated at progress report unless specified.     2/7/2025    R shoulder A/PROM   Flexion 160/170 deg (in supine)  ER at 90 passive 70 deg  IR at 90 passive 50 deg    CMS Impairment/Limitation/Restriction for FOTO Shoulder Survey    Therapist reviewed FOTO scores for Santiago De Luna on 1/15/2025.   FOTO documents entered into [employer] - see Media section.    Evaluation: 39%    Current : 48%    Goal: 67%        Treatment     Santiago received the treatments listed below:      therapeutic exercises to develop strength, endurance, ROM, flexibility, posture, and core stabilization for 08 minutes including:  Pulleys flexion and scaption x 3 min ea   Supine chest press with 1# dowel x 30   Supine serratus punch with 1# dowel x 30   Supine OH flexion with 1# dowel x 30     manual therapy techniques: Joint mobilizations, Myofacial release, and Soft tissue Mobilization were applied to the: R shoulder for 08 minutes, including:  STM/MFR pecs, deltoids, posterior scap mm's (infra and supraspinatus, UT/LS)  GHJ glides all directions Gr I-III, GHJ distraction GrI-III with gentle oscillations   Gentle PROM of shoulder  ER, flex, abd in scap plane within tolerable and available range as indicated per MD protocol   + R lat MFR and MWM     neuromuscular re-education activities to improve: Balance, Coordination, Kinesthetic, Sense, Proprioception, and Posture for 14 minutes. The following activities were included:  Sidelying flexion with yellow pole 2 x 10   Sidelying flexion x10   Sidelying abduction 10x   Sidelying horizontal abduction x10   Sidelying ER with towel roll 2 x 10     Prone row 2 x 10   Prone abduction x10   Prone shoulder extension 2 x 10   Prone horizontal abd 2 x 10   Prone scaption 10x 5" hold     Ball on table circles 20x cw/ccw (green ball) >> progressed to wall today with blue ball at 80 deg flexion " "x 10 cw/ccw (to target deltoids in CKC)   Ball on table alphabet (green ball) >> progressed to wall today with blue ball at 80 deg flexion x 1 (rest break at M)     therapeutic activities to improve functional performance for 25 minutes, including:  supine D2 pattern (no resistance) x 10     serratus plantigrade plus only against wall x 10     SL sleeper stretch x10x5" holds   IR strap stretch x 5x10" holds   Ball roll up on wall 2 x 10 (green ball)   IR walkout x RTB x 10 (double handle)   ER walkout x YTB x 10 (double handle)   standing resisted rows x2x10 x GTB   standing resisted SALPD x2x10 x RTB     Ice x 10 min to R shoulder -- defer today     Patient Education and Home Exercises       Education provided:   - Patient educated on proper exercise technique.      Written Home Exercises Provided: Pt instructed to continue prior HEP. Exercises were reviewed and Santiago was able to demonstrate them prior to the end of the session.  Santiago demonstrated good  understanding of the education provided. See Electronic Medical Record under Patient Instructions for exercises provided during therapy sessions    Assessment     Overall good tolerance to treatment with no adverse effects. Required verbal cues during standing SALPD and rows to decrease UT substitutions, but able to correct when cued. Good carry over from last visit with new exercises. Trial of repeated progressions from last visit, with minimal progressions with resisted SALPD, to monitor soreness/DOMS for next visit. Continue to monitor and progress pt as tolerated.     Pt progressing well per MD protocol.   Santiago Is progressing well towards his goals.   Patient prognosis is Good.     Patient will continue to benefit from skilled outpatient physical therapy to address the deficits listed in the problem list box on initial evaluation, provide pt/family education and to maximize pt's level of independence in the home and community environment.     Patient's " spiritual, cultural and educational needs considered and pt agreeable to plan of care and goals.     Anticipated barriers to physical therapy: standard, transportation    Goals: updated 2/26/2025   Short Term Goals (4 Weeks):   1. Pt to demonstrate improved PROM by 10% to allow pt to perform self care activities with increased ease. (Not met, progressing)  2. Pt to demonstrate improved flexibility by a half grade to allow improved ADLs with increased ease.  (Not met, progressing)  3. Pt will report <2/10 pain within the R shoulder for ease with donning/doffing sling. (Not met, progressing)  4. Pt will report being independent with his/her HEP for maintenance of improvements gained during therapy sessions (Not met, progressing)  5. Pt to demonstrate improved functional ability with FOTO score >=48% . (Not met, progressing)     Long Term Goals (12 Weeks):   1. Pt to demonstrate improved ROM to WNL, R=L, to allow pt to perform self care with increased ease. (Not met, progressing)  2. Pt to demonstrate improved flexibility by a full grade to allow improved postural alignment with increased ease. (Not met, progressing)  3. Pt will demonstrate >=4+/5 strength within the R shoulder for ease with house hold chores (Not met, progressing)  4. Pt to demonstrate improved functional ability with FOTO score >=67% . (Not met, progressing)  5. Pt independent with HEP and demonstrates good return technique. (Not met, progressing)    Plan     Cont to progress towards goals set by PT focusing on joint range of motion and scap control.      Lizette Mabry, PTA

## 2025-02-26 NOTE — TELEPHONE ENCOUNTER
Pt was confused over insurance coverage.Insurance does not pay for Rx.[employer] will give Pt a coupon to receive his Cialis 2/26/2025.

## 2025-02-28 ENCOUNTER — CLINICAL SUPPORT (OUTPATIENT)
Dept: REHABILITATION | Facility: OTHER | Age: 65
End: 2025-02-28
Payer: COMMERCIAL

## 2025-02-28 DIAGNOSIS — Z98.890 POST-OPERATIVE STATE: Primary | ICD-10-CM

## 2025-02-28 DIAGNOSIS — M25.511 ACUTE POSTOPERATIVE PAIN OF RIGHT SHOULDER: ICD-10-CM

## 2025-02-28 DIAGNOSIS — G89.18 ACUTE POSTOPERATIVE PAIN OF RIGHT SHOULDER: ICD-10-CM

## 2025-02-28 PROCEDURE — 97530 THERAPEUTIC ACTIVITIES: CPT | Mod: PN

## 2025-02-28 PROCEDURE — 97112 NEUROMUSCULAR REEDUCATION: CPT | Mod: PN

## 2025-02-28 NOTE — PROGRESS NOTES
"[facility]   Physical Therapy Treatment Note      Name: Santiago De Luna  Clinic Number: [id number]    Therapy Diagnosis:   Encounter Diagnoses   Name Primary?    Post-operative state Yes    Acute postoperative pain of right shoulder          Physician: Rivera Fernandes, *    Visit Date: 2/28/2025    Physician Orders: PT Eval and Treat: Massive size Rotator cuff protocol... Biceps tenodesis was not performed; if tenodesis was performed limit aggressive biceps flexion exercises for first 6 weeks     Medical Diagnosis from Referral: M75.101 (ICD-10-CM) - Rotator cuff syndrome, right S46.011A (ICD-10-CM) - Traumatic complete tear of right rotator cuff, initial encounter M25.811 (ICD-10-CM) - Impingement of right shoulder   Evaluation Date: 11/18/2024  Authorization Period Expiration: 12/31/2024  Plan of Care Expiration: updated to 4/14/2025  Progress Note Due: 3/14/2024  Date of Surgery: 11/12/2024  Visit # / Visits authorized: 17/ 21   FOTO: 3/ 3 last issued 1/15/2025      Precautions: Standard and post-op      Time In: 11:00 a   Time Out: 12:00 p   Total Billable Time: 60 minutes     PTA Visit #: 2/5       Subjective     Patient states: global soreness in the shoulder after last visit but feeling good. C/c today is stiffness in the shoulder after [place] the [place].      He was compliant with home exercise program.   Response to previous treatment: "good, soreness"   Functional change: no change reported     Pain: 0/10   Location: R shoulder     Objective      Objective Measures updated at progress report unless specified.     Updated POC 2/14/2025         Treatment     Santiago received the treatments listed below:      therapeutic exercises to develop strength, endurance, ROM, flexibility, posture, and core stabilization for 00 minutes including:  Pulleys flexion and scaption x 3 min ea   Supine chest press with 1# dowel x 30   Supine serratus punch with 1# dowel x 30   Supine " "OH flexion with 1# dowel x 30     manual therapy techniques: Joint mobilizations, Myofacial release, and Soft tissue Mobilization were applied to the: R shoulder for 00 minutes, including:  STM/MFR pecs, deltoids, posterior scap mm's (infra and supraspinatus, UT/LS)  GHJ glides all directions Gr I-III, GHJ distraction GrI-III with gentle oscillations   Gentle PROM of shoulder  ER, flex, abd in scap plane within tolerable and available range as indicated per MD protocol   R lat MFR and MWM     neuromuscular re-education activities to improve: Balance, Coordination, Kinesthetic, Sense, Proprioception, and Posture for 30 minutes. The following activities were included:    Ball on wall FLEX circles 15x cw/ccw (blue air ball)  +ball on wall SCAP circles x 10 cw/ccw   Ball on wall FLEX alphabet (blue ball) x1     Prone row 3 x 10 x 3#DB   Prone shoulder extension 3 x 10 x 1# DB  Prone shoulder abduction (T) x3x10 x 0#   Prone scaption (Y) x2x10 x 0# -- increase to 3 sets nv    Sidelying ER with towel roll 2 x 10 x 1# DB  Sidelying flexion with yellow pole 2 x 10   Sidelying flexion x10   Sidelying abduction 10x   Sidelying horizontal abduction x10     +standing wall pec stretch x 3x15"    therapeutic activities to improve functional performance for 30 minutes, including:  SL sleeper stretch x10x5" holds   IR strap stretch x 5x10" holds     supine D2 pattern (no resistance) x 5, x 10 w/ light PT resistance today     +Serratus Push up plus (no push up) EOM x 2x10    IR walkout x RTB x 2x10 x single handle  ER walkout x YTB x 10 (double handle), x 15 x single handle   standing resisted rows x2x10 x Gray TB   standing resisted SALPD x2x10 x RTB     Ice x 10 min to R shoulder -- defer today     Patient Education and Home Exercises       Education provided:   - Patient educated on proper exercise technique.      Written Home Exercises Provided: Pt instructed to continue prior HEP. Exercises were reviewed and Santiago was able to " demonstrate them prior to the end of the session.  Santiago demonstrated good  understanding of the education provided. See Electronic Medical Record under Patient Instructions for exercises provided during therapy sessions    Assessment     Progressed strength, endurance, and motor control/coordination program today with increased repetitions and resistance throughout session when tolerable. Good tolerance overall with notable fatigue by end of session, with appropriate training effect noted.  Pt progressing well per MD protocol.   Santiago Is progressing well towards his goals.   Patient prognosis is Good.     Patient will continue to benefit from skilled outpatient physical therapy to address the deficits listed in the problem list box on initial evaluation, provide pt/family education and to maximize pt's level of independence in the home and community environment.     Patient's spiritual, cultural and educational needs considered and pt agreeable to plan of care and goals.     Anticipated barriers to physical therapy: standard, transportation    Goals: updated 2/28/2025   Short Term Goals (4 Weeks):   1. Pt to demonstrate improved PROM by 10% to allow pt to perform self care activities with increased ease. (Not met, progressing)  2. Pt to demonstrate improved flexibility by a half grade to allow improved ADLs with increased ease.  (Not met, progressing)  3. Pt will report <2/10 pain within the R shoulder for ease with donning/doffing sling. (Not met, progressing)  4. Pt will report being independent with his/her HEP for maintenance of improvements gained during therapy sessions (Not met, progressing)  5. Pt to demonstrate improved functional ability with FOTO score >=48% . (Not met, progressing)     Long Term Goals (12 Weeks):   1. Pt to demonstrate improved ROM to WNL, R=L, to allow pt to perform self care with increased ease. (Not met, progressing)  2. Pt to demonstrate improved flexibility by a full grade to allow  improved postural alignment with increased ease. (Not met, progressing)  3. Pt will demonstrate >=4+/5 strength within the R shoulder for ease with house hold chores (Not met, progressing)  4. Pt to demonstrate improved functional ability with FOTO score >=67% . (Not met, progressing)  5. Pt independent with HEP and demonstrates good return technique. (Not met, progressing)    Plan     Cont to progress towards goals set by PT focusing on joint range of motion and scap control.      Jo Ann Mei, PT

## 2025-03-13 ENCOUNTER — CLINICAL SUPPORT (OUTPATIENT)
Dept: REHABILITATION | Facility: OTHER | Age: 65
End: 2025-03-13
Payer: COMMERCIAL

## 2025-03-13 DIAGNOSIS — Z98.890 POST-OPERATIVE STATE: Primary | ICD-10-CM

## 2025-03-13 DIAGNOSIS — G89.18 ACUTE POSTOPERATIVE PAIN OF RIGHT SHOULDER: ICD-10-CM

## 2025-03-13 DIAGNOSIS — M25.511 ACUTE POSTOPERATIVE PAIN OF RIGHT SHOULDER: ICD-10-CM

## 2025-03-13 PROCEDURE — 97112 NEUROMUSCULAR REEDUCATION: CPT | Mod: PN,CQ

## 2025-03-13 PROCEDURE — 97530 THERAPEUTIC ACTIVITIES: CPT | Mod: PN,CQ

## 2025-03-13 NOTE — PROGRESS NOTES
"[facility]   Physical Therapy Treatment Note      Name: Santiago De Luna  Clinic Number: [id number]    Therapy Diagnosis:   Encounter Diagnoses   Name Primary?    Post-operative state Yes    Acute postoperative pain of right shoulder            Physician: Rivera Fernandes, *    Visit Date: 3/13/2025    Physician Orders: PT Eval and Treat: Massive size Rotator cuff protocol... Biceps tenodesis was not performed; if tenodesis was performed limit aggressive biceps flexion exercises for first 6 weeks     Medical Diagnosis from Referral: M75.101 (ICD-10-CM) - Rotator cuff syndrome, right S46.011A (ICD-10-CM) - Traumatic complete tear of right rotator cuff, initial encounter M25.811 (ICD-10-CM) - Impingement of right shoulder   Evaluation Date: 11/18/2024  Authorization Period Expiration: 12/31/2024  Plan of Care Expiration: updated to 4/14/2025  Progress Note Due: 3/14/2024  Date of Surgery: 11/12/2024  Visit # / Visits authorized: 18/ 21   FOTO: 3/ 3 last issued 1/15/2025      Precautions: Standard and post-op      Time In: 1:15 pm   Time Out: 2:05 pm   Total Billable Time: 50 minutes     PTA Visit #: 1/5       Subjective     Patient states: missed a lot from [place] and getting a cold. Recovereed well from the cold. States he is feeling good today and feels like he keeps getting stronger.     He was compliant with home exercise program.   Response to previous treatment: "good, soreness"   Functional change: no change reported     Pain: 0/10   Location: R shoulder     Objective      Objective Measures updated at progress report unless specified.     Updated POC 2/14/2025     Treatment     Santiago received the treatments listed below:      therapeutic exercises to develop strength, endurance, ROM, flexibility, posture, and core stabilization for 00 minutes including:  Pulleys flexion and scaption x 3 min ea   Supine chest press with 1# dowel x 30   Supine serratus punch with 1# dowel x 30   Supine OH " "flexion with 1# dowel x 30     manual therapy techniques: Joint mobilizations, Myofacial release, and Soft tissue Mobilization were applied to the: R shoulder for 00 minutes, including:  STM/MFR pecs, deltoids, posterior scap mm's (infra and supraspinatus, UT/LS)  GHJ glides all directions Gr I-III, GHJ distraction GrI-III with gentle oscillations   Gentle PROM of shoulder  ER, flex, abd in scap plane within tolerable and available range as indicated per MD protocol   R lat MFR and MWM     neuromuscular re-education activities to improve: Balance, Coordination, Kinesthetic, Sense, Proprioception, and Posture for 25 minutes. The following activities were included:    Ball on wall FLEX circles 15x cw/ccw (blue air ball)   Ball on wall FLEX alphabet (blue ball) x1   Ball on wall SCAP circles x 10 cw/ccw     Prone row 3 x 10 x 3#DB   Prone shoulder extension 3 x 10 x 1# DB   Prone shoulder abduction (T) x3x10 x 0#   Prone scaption (Y) x3x10 x 0#     Sidelying ER with towel roll 2 x 10 x 1# DB   Sidelying flexion with yellow pole 2 x 10   Sidelying flexion x10   Sidelying abduction 10x   Sidelying horizontal abduction x10     Standing wall pec stretch x 3x15"     therapeutic activities to improve functional performance for 25 minutes, including:  SL sleeper stretch x10x10" holds   IR strap stretch x 10x10" holds     supine D2 pattern (no resistance) 2x10, x 10 w/ light PT resistance today     Serratus Push up plus (no push up) EOM x 3x10     IR walkout x RTB x 2x10 x single handle  ER walkout x YTB x 1 x 8  (double handle)   standing resisted rows x2x10 x Gray TB   standing resisted SALPD x2x10 x RTB     Ice x 10 min to R shoulder -- defer today     Patient Education and Home Exercises       Education provided:   - Patient educated on proper exercise technique.      Written Home Exercises Provided: Pt instructed to continue prior HEP. Exercises were reviewed and Santiago was able to demonstrate them prior to the end of the " session.  Santiago demonstrated good  understanding of the education provided. See Electronic Medical Record under Patient Instructions for exercises provided during therapy sessions    Assessment     Overall good tolerance with  no adverse effects. Pt progressed in sets and reps today. Ended session early 2* early fatigue but overall good training effects achieved. Required minimal verbal cues for proper scapular recruitment with walk outs and supine horizontal abduction, which pt was able to correct when cued. Continue to monitor and progress pt as tolerated.     Pt progressing well per MD protocol.   Santiago Is progressing well towards his goals.   Patient prognosis is Good.     Patient will continue to benefit from skilled outpatient physical therapy to address the deficits listed in the problem list box on initial evaluation, provide pt/family education and to maximize pt's level of independence in the home and community environment.     Patient's spiritual, cultural and educational needs considered and pt agreeable to plan of care and goals.     Anticipated barriers to physical therapy: standard, transportation    Goals: updated 3/13/2025   Short Term Goals (4 Weeks):   1. Pt to demonstrate improved PROM by 10% to allow pt to perform self care activities with increased ease. (Not met, progressing)  2. Pt to demonstrate improved flexibility by a half grade to allow improved ADLs with increased ease.  (Not met, progressing)  3. Pt will report <2/10 pain within the R shoulder for ease with donning/doffing sling. (Not met, progressing)  4. Pt will report being independent with his/her HEP for maintenance of improvements gained during therapy sessions (Not met, progressing)  5. Pt to demonstrate improved functional ability with FOTO score >=48% . (Not met, progressing)     Long Term Goals (12 Weeks):   1. Pt to demonstrate improved ROM to WNL, R=L, to allow pt to perform self care with increased ease. (Not met,  progressing)  2. Pt to demonstrate improved flexibility by a full grade to allow improved postural alignment with increased ease. (Not met, progressing)  3. Pt will demonstrate >=4+/5 strength within the R shoulder for ease with house hold chores (Not met, progressing)  4. Pt to demonstrate improved functional ability with FOTO score >=67% . (Not met, progressing)  5. Pt independent with HEP and demonstrates good return technique. (Not met, progressing)    Plan     Cont to progress towards goals set by PT focusing on joint range of motion and scap control.      Lizette Mabry, PTA

## 2025-03-17 ENCOUNTER — OFFICE VISIT (OUTPATIENT)
Dept: PODIATRY | Facility: CLINIC | Age: 65
End: 2025-03-17
Payer: COMMERCIAL

## 2025-03-17 ENCOUNTER — CLINICAL SUPPORT (OUTPATIENT)
Dept: REHABILITATION | Facility: OTHER | Age: 65
End: 2025-03-17
Payer: COMMERCIAL

## 2025-03-17 ENCOUNTER — DOCUMENTATION ONLY (OUTPATIENT)
Dept: REHABILITATION | Facility: OTHER | Age: 65
End: 2025-03-17

## 2025-03-17 VITALS
BODY MASS INDEX: 28.37 KG/M2 | HEART RATE: 71 BPM | DIASTOLIC BLOOD PRESSURE: 74 MMHG | WEIGHT: 209.44 LBS | SYSTOLIC BLOOD PRESSURE: 150 MMHG | HEIGHT: 72 IN

## 2025-03-17 DIAGNOSIS — M25.511 ACUTE POSTOPERATIVE PAIN OF RIGHT SHOULDER: ICD-10-CM

## 2025-03-17 DIAGNOSIS — L60.0 INGROWN NAIL: Chronic | ICD-10-CM

## 2025-03-17 DIAGNOSIS — Z98.890 POST-OPERATIVE STATE: Primary | ICD-10-CM

## 2025-03-17 DIAGNOSIS — G89.18 ACUTE POSTOPERATIVE PAIN OF RIGHT SHOULDER: ICD-10-CM

## 2025-03-17 DIAGNOSIS — M79.675 TOE PAIN, LEFT: Primary | ICD-10-CM

## 2025-03-17 PROCEDURE — 99213 OFFICE O/P EST LOW 20 MIN: CPT | Mod: S$GLB,,, | Performed by: PODIATRIST

## 2025-03-17 PROCEDURE — 99999 PR PBB SHADOW E&M-EST. PATIENT-LVL III: CPT | Mod: PBBFAC,,, | Performed by: PODIATRIST

## 2025-03-17 PROCEDURE — 97112 NEUROMUSCULAR REEDUCATION: CPT | Mod: PN

## 2025-03-17 PROCEDURE — 97530 THERAPEUTIC ACTIVITIES: CPT | Mod: PN

## 2025-03-17 NOTE — PROGRESS NOTES
Subjective:      Patient ID: Santiago De Luna is a 64 y.o. male.    Chief Complaint: Ingrown Toenail    Sharp, throbbing pain left big toe/toenail.  Chronic condition present off and on, This exacerbations been gradual onset worsening over the past couple of weeks.  aggravated  by direct pressure.   Denies trauma and surgery left hallux.   He is considering a more permanent procedure.   Previously scheduled but had to cancel.   Needs to reschedule procedure.       Patient Active Problem List   Diagnosis    Traumatic complete tear of left rotator cuff    Vasomotor rhinitis    Non-traumatic rotator cuff tear, right    S/P arthroscopy of right shoulder    Chronic right shoulder pain    Impingement of right shoulder    Traumatic complete tear of right rotator cuff    Anxiety    Benign prostatic hyperplasia with nocturia    Snoring    Marijuana use    Alcohol consumption of more than two drinks per day    Macrocytic anemia    Post-operative state    Acute postoperative pain of right shoulder       Current Outpatient Medications on File Prior to Visit   Medication Sig Dispense Refill    tadalafiL (CIALIS) 5 MG tablet Take 1 tablet (5 mg total) by mouth daily as needed for Erectile Dysfunction. 30 tablet 11    tamsulosin (FLOMAX) 0.4 mg Cap Take 1 capsule (0.4 mg total) by mouth once daily. 90 capsule 3    celecoxib (CELEBREX) 200 MG capsule Take 1 capsule (200 mg total) by mouth 2 (two) times daily with meals. (breakfast and dinner) 60 capsule 0     No current facility-administered medications on file prior to visit.         Review of patient's allergies indicates:   Allergen Reactions    Indomethacin      Diarrhea and gas    Shellfish derived Itching, Rash and Swelling     Mussels,clams,shrimps- rash  Swelling of face      Hydrocodone Hives, Itching and Rash    Hydrocodone-acetaminophen Itching and Rash    Mupirocin Diarrhea, Nausea And Vomiting and Other (See Comments)         Review of Systems   Constitutional: Negative for  chills, diaphoresis, fever, malaise/fatigue and night sweats.   Cardiovascular:  Negative for claudication, cyanosis, leg swelling and syncope.   Skin:  Positive for nail changes. Negative for color change, dry skin, rash, suspicious lesions and unusual hair distribution.   Musculoskeletal:  Negative for falls, joint pain, joint swelling, muscle cramps, muscle weakness and stiffness.   Gastrointestinal:  Negative for constipation, diarrhea, nausea and vomiting.   Neurological:  Negative for brief paralysis, disturbances in coordination, focal weakness, numbness, paresthesias, sensory change and tremors.           Objective:      Vitals:    03/17/25 1121   BP: (!) 150/74   Pulse: 71   Weight: 95 kg (209 lb 7 oz)   Height: 6' (1.829 m)      Physical Exam  Constitutional:       General: He is not in acute distress.     Appearance: He is well-developed. He is not diaphoretic.   Cardiovascular:      Pulses:           Popliteal pulses are 2+ on the right side and 2+ on the left side.        Dorsalis pedis pulses are 2+ on the right side and 2+ on the left side.        Posterior tibial pulses are 2+ on the right side and 2+ on the left side.      Comments: Capillary refill 3 seconds all toes/distal feet, all toes/both feet warm to touch.      Negative lymphadenopathy bilateral popliteal fossa and tarsal tunnel.      Negavie lower extremity edema bilateral.    Musculoskeletal:      Right ankle: No swelling, deformity, ecchymosis or lacerations. Normal range of motion. Normal pulse.      Right Achilles Tendon: Normal. No defects. Alfred's test negative.   Lymphadenopathy:      Lower Body: No right inguinal adenopathy. No left inguinal adenopathy.      Comments: Negative lymphadenopathy bilateral popliteal fossa and tarsal tunnel.    Negative lymphangitic streaking bilateral feet/ankles/legs.   Skin:     General: Skin is warm and dry.      Capillary Refill: Capillary refill takes 2 to 3 seconds.      Coloration: Skin is not  pale.      Findings: No abrasion, bruising, burn, ecchymosis, erythema, laceration, lesion or rash.      Nails: There is no clubbing.      Comments:   Visible and palpable ingrowth of toenail medial border left hallux with pain to palpation, and focal localized erythema and edema,  without ulceration, drainage, pus, tracking, fluctuance, malodor, or cardinal signs infection.       Neurological:      Mental Status: He is alert and oriented to person, place, and time.      Sensory: No sensory deficit.      Motor: No tremor, atrophy or abnormal muscle tone.      Gait: Gait normal.      Deep Tendon Reflexes:      Reflex Scores:       Patellar reflexes are 2+ on the right side and 2+ on the left side.       Achilles reflexes are 2+ on the right side and 2+ on the left side.              Assessment:       Encounter Diagnoses   Name Primary?    Toe pain, left Yes    Ingrown nail              Plan:       Santiago was seen today for ingrown toenail.    Diagnoses and all orders for this visit:    Toe pain, left    Ingrown nail        I counseled the patient on his conditions, their implications and medical management.    Exacerbation of a chronic condition of the left great toe ingrown toenail.  Exhausted conservative treatment.  Patient would like to proceed with the procedure.  Will  scheduled for a chemical matricectomy procedure.  In the meantime, I would trimmed a wedge of the ingrown border.  Continue topical medication.     General nail care measures for abnormal nails include:  Keeping nails trimmed short, but avoid overzealous trimming  Avoiding trauma   Avoiding contact irritants   Keeping nails dry (avoiding wet work)  Avoiding all nail cosmetics  Wearing shoes that fit well at the toe box.  Avoid tight shoes.

## 2025-03-17 NOTE — PROGRESS NOTES
Physical Therapist and Physical Therapist Assistant met face to face to discuss patient's treatment plan and progress towards established goals. Pt will be seen by a physical therapist minimally every 6th visit or every 30 days.    Lizette Mabry, ROWENA  03/17/2025

## 2025-03-17 NOTE — PROGRESS NOTES
"[facility]   Physical Therapy Treatment Note      Name: Santiago De Luna  Clinic Number: [id number]    Therapy Diagnosis:   Encounter Diagnoses   Name Primary?    Post-operative state Yes    Acute postoperative pain of right shoulder            Physician: Rivera Fernandes, *    Visit Date: 3/17/2025    Physician Orders: PT Eval and Treat: Massive size Rotator cuff protocol... Biceps tenodesis was not performed; if tenodesis was performed limit aggressive biceps flexion exercises for first 6 weeks     Medical Diagnosis from Referral: M75.101 (ICD-10-CM) - Rotator cuff syndrome, right S46.011A (ICD-10-CM) - Traumatic complete tear of right rotator cuff, initial encounter M25.811 (ICD-10-CM) - Impingement of right shoulder   Evaluation Date: 11/18/2024  Authorization Period Expiration: 12/31/2024  Plan of Care Expiration: updated to 4/14/2025  Progress Note Due: 3/14/2024  Date of Surgery: 11/12/2024  Visit # / Visits authorized: 19/ 21   FOTO: 3/ 3 last issued 1/15/2025      Precautions: Standard and post-op      Time In: 9:15 pm   Time Out: 10:07 pm   Total Billable Time: 52 minutes     PTA Visit #: 1/5       Subjective     Patient states: OH reaching is improving with less intense pinch pain at endrange.    He was compliant with home exercise program.   Response to previous treatment: "good, soreness"   Functional change: no change reported     Pain: 0/10   Location: R shoulder     Objective      Objective Measures updated at progress report unless specified.     Updated POC 2/14/2025     Treatment     Santiago received the treatments listed below:      therapeutic exercises to develop strength, endurance, ROM, flexibility, posture, and core stabilization for 00 minutes including:  Pulleys flexion and scaption x 3 min ea   Supine chest press with 1# dowel x 30   Supine serratus punch with 1# dowel x 30   Supine OH flexion with 1# dowel x 30     manual therapy techniques: Joint mobilizations, " "Myofacial release, and Soft tissue Mobilization were applied to the: R shoulder for 00 minutes, including:  STM/MFR pecs, deltoids, posterior scap mm's (infra and supraspinatus, UT/LS)  GHJ glides all directions Gr I-III, GHJ distraction GrI-III with gentle oscillations   Gentle PROM of shoulder  ER, flex, abd in scap plane within tolerable and available range as indicated per MD protocol   R lat MFR and MWM     neuromuscular re-education activities to improve: Balance, Coordination, Kinesthetic, Sense, Proprioception, and Posture for 27 minutes. The following activities were included:    Ball on wall FLEX circles 15x cw/ccw (blue air ball)   Ball on wall FLEX alphabet (blue ball) x1   Ball on wall SCAP circles x 10 cw/ccw     Prone row 3 x 10 x 3#DB   Prone shoulder extension 3 x 10 x 1# DB   Prone shoulder abduction (T) x3x10 x 0#   Prone scaption (Y) x3x10 x 0#     Sidelying ER with towel roll 3 x 8 x 1# DB -- changed to standing with small blue ball, 1 YTB, ROM to neutral (tolerable)  Standing wall pec stretch (uni) x 3x30"   +standing open books x2x8 - 1 set with arm ext, 1 set arm flexed    therapeutic activities to improve functional performance for 25 minutes, including:  SL sleeper stretch x10x10" holds   IR strap stretch x 10x10" holds     supine D2 pattern (no resistance) 2x10 -- resume light resistance nv    Serratus Push up plus (w/ push up today) EOM (lowered hi-lo table) x 3x7     IR walkout x RTB x 2x10 x single handle  ER walkout x YTB x 1 x 8  (double handle)   standing resisted rows x4x10 x Gray TB -- changed to 7# (ea UE) on Freemotion   standing resisted SALPD x3x15 x GTB     Ice x 10 min to R shoulder -- defer today     Patient Education and Home Exercises       Education provided:   - Patient educated on proper exercise technique.      Written Home Exercises Provided: Pt instructed to continue prior HEP. Exercises were reviewed and Santiago was able to demonstrate them prior to the end of the " session.  Santiago demonstrated good  understanding of the education provided. See Electronic Medical Record under Patient Instructions for exercises provided during therapy sessions    Assessment     Progressed scapular and RTC strength and stabilization program today. Mod fatigue with notable muscular juddering noted with CKC > OKC today. Plan to progress as tolerated nv.    Pt progressing well per MD protocol.     Santiago Is progressing well towards his goals.   Patient prognosis is Good.     Patient will continue to benefit from skilled outpatient physical therapy to address the deficits listed in the problem list box on initial evaluation, provide pt/family education and to maximize pt's level of independence in the home and community environment.     Patient's spiritual, cultural and educational needs considered and pt agreeable to plan of care and goals.     Anticipated barriers to physical therapy: standard, transportation    Goals: updated 3/17/2025   Short Term Goals (4 Weeks):   1. Pt to demonstrate improved PROM by 10% to allow pt to perform self care activities with increased ease. (Not met, progressing)  2. Pt to demonstrate improved flexibility by a half grade to allow improved ADLs with increased ease.  (Not met, progressing)  3. Pt will report <2/10 pain within the R shoulder for ease with donning/doffing sling. (Not met, progressing)  4. Pt will report being independent with his/her HEP for maintenance of improvements gained during therapy sessions (Not met, progressing)  5. Pt to demonstrate improved functional ability with FOTO score >=48% . (Not met, progressing)     Long Term Goals (12 Weeks):   1. Pt to demonstrate improved ROM to WNL, R=L, to allow pt to perform self care with increased ease. (Not met, progressing)  2. Pt to demonstrate improved flexibility by a full grade to allow improved postural alignment with increased ease. (Not met, progressing)  3. Pt will demonstrate >=4+/5 strength  within the R shoulder for ease with house hold chores (Not met, progressing)  4. Pt to demonstrate improved functional ability with FOTO score >=67% . (Not met, progressing)  5. Pt independent with HEP and demonstrates good return technique. (Not met, progressing)    Plan     Cont to progress towards goals set by PT focusing on joint range of motion and scap control.      Jo Ann Mei, PT

## 2025-03-21 ENCOUNTER — CLINICAL SUPPORT (OUTPATIENT)
Dept: REHABILITATION | Facility: OTHER | Age: 65
End: 2025-03-21
Payer: COMMERCIAL

## 2025-03-21 DIAGNOSIS — M25.511 ACUTE POSTOPERATIVE PAIN OF RIGHT SHOULDER: Primary | ICD-10-CM

## 2025-03-21 DIAGNOSIS — M25.511 ACUTE PAIN OF RIGHT SHOULDER: ICD-10-CM

## 2025-03-21 DIAGNOSIS — G89.18 ACUTE POSTOPERATIVE PAIN OF RIGHT SHOULDER: Primary | ICD-10-CM

## 2025-03-21 PROCEDURE — 97110 THERAPEUTIC EXERCISES: CPT | Mod: PN | Performed by: INTERNAL MEDICINE

## 2025-03-21 PROCEDURE — 97530 THERAPEUTIC ACTIVITIES: CPT | Mod: PN | Performed by: INTERNAL MEDICINE

## 2025-03-21 PROCEDURE — 97112 NEUROMUSCULAR REEDUCATION: CPT | Mod: PN | Performed by: INTERNAL MEDICINE

## 2025-03-21 NOTE — PROGRESS NOTES
"[facility]   Physical Therapy Treatment Note      Name: Santiago De Luna  Clinic Number: [id number]    Therapy Diagnosis:   Encounter Diagnoses   Name Primary?    Post-operative state Yes    Acute postoperative pain of right shoulder            Physician: Rivera Fernandes, *    Visit Date: 3/21/2025      Physician Orders: PT Eval and Treat: Massive size Rotator cuff protocol... Biceps tenodesis was not performed; if tenodesis was performed limit aggressive biceps flexion exercises for first 6 weeks     Medical Diagnosis from Referral: M75.101 (ICD-10-CM) - Rotator cuff syndrome, right S46.011A (ICD-10-CM) - Traumatic complete tear of right rotator cuff, initial encounter M25.811 (ICD-10-CM) - Impingement of right shoulder   Evaluation Date: 11/18/2024  Authorization Period Expiration: 12/31/2024  Plan of Care Expiration: updated to 4/14/2025  Progress Note Due: 4/21/25  Date of Surgery: 11/12/2024  Visit # / Visits authorized:  20/ 21   FOTO: 3/ 3 last issued 3/21     Precautions: Standard and post-op      Time In: 910 am   Time Out: 1005 am   Total Billable Time: 55  minutes     PTA Visit #: 0/5       Subjective     Patient states:  sore after last visit but rom progressing. Reports this is 3rd R shoulder surgery, no L shoulder c/o    He was compliant with home exercise program.   Response to previous treatment: "good, soreness"   Functional change: no change reported     Pain: 0/10   Location: R shoulder     Objective                     shoulder A/PROM  Flexion 160/170 deg (in supine)  ER at 90 passive 75 deg,  85  L   IR at 90 passive 50 deg, L 50  L       4 in R IR difference vs  L     MMT 3/21  Er 4+  Scaption 4+  Flex 4  Abd 4+        CMS Impairment/Limitation/Restriction for FOTO Shoulder Survey     Therapist reviewed FOTO scores for Santiago De Luna on  3/21/2025    FOTO documents entered into [email or web address] - see Media section.     Evaluation: 39%     Current : 57     Goal: 67%          " "  Treatment     Santiago received the treatments listed below:      therapeutic exercises to develop strength, endurance, ROM, flexibility, posture, and core stabilization for 12   minutes including:  Pulleys flexion and scaption x 3 min ea   Supine chest press with 1# dowel x 30   Supine serratus punch with 1# dowel x 30   Supine OH flexion with 1# dowel x 30   Cross body stretch supine 20 sec x 4   PROM R shoulder emphasis on IR   IR strap 20 sec x 4     manual therapy techniques: Joint mobilizations, Myofacial release, and Soft tissue Mobilization were applied to the: R shoulder for 3 minutes, including:  STM/MFR pecs, deltoids, posterior scap mm's (infra and supraspinatus, UT/LS)  GHJ glides all directions Gr I-III, GHJ distraction GrI-III     R lat MFR and MWM     neuromuscular re-education activities to improve: Balance, Coordination, Kinesthetic, Sense, Proprioception, and Posture for  30  minutes. The following activities were included:    Ball on wall FLEX circles 15x cw/ccw (blue air ball)   Scap clocks red ( small rom due to difficulty) B 5x  Ball on wall FLEX alphabet (blue ball) x1   Ball on wall SCAP circles x 10 cw/ccw     Prone row 3 x 10 x 3#DB   Prone shoulder extension 3 x 10 x 0# DB   Prone shoulder abduction (T) x3x10 x 0#   Prone scaption (Y) x3x10 x 0#  DB  Attempted ER yellow band @ 90 deg abd x 5 - too painful/ difficult   W yellow band 2 x 10   reassessment    Sidelying ER with towel roll 3 x 8 x 1# DB -- changed to standing with small blue ball, 1 YTB, ROM to neutral (tolerable)  Standing wall pec stretch (uni) x 3x30"   +standing open books x2x8 - 1 set with arm ext, 1 set arm flexed    therapeutic activities to improve functional performance for 10 minutes, including:      supine D2 pattern (no resistance) 3x10  1 #  Serratus Push up plus (w/ push up today) EOM (lowered hi-lo table) x 3x7     IR walkout x RTB x 2x10 x single handle  ER walkout x YTB x 1 x 8  (double handle)   standing " resisted rows 7# (ea UE) on Freemotion   standing resisted  7 #  B shoulder extension free motion     Ice x 10 min to R shoulder -- defer today     Patient Education and Home Exercises       Education provided:   - cont IR stretching, scap muscle strengthening as laureano     Written Home Exercises Provided: Pt instructed to continue prior HEP. Exercises were reviewed and Santiago was able to demonstrate them prior to the end of the session.  Santiago demonstrated good  understanding of the education provided. See Electronic Medical Record under Patient Instructions for exercises provided during therapy sessions    Assessment     Progressing with improved strength, rom, decreased pain. Will benefit from cont PT to address deficits with IR ROM , strength R.     Pt progressing well per MD protocol.     Santiago Is progressing well towards his goals.   Patient prognosis is Good.     Patient will continue to benefit from skilled outpatient physical therapy to address the deficits listed in the problem list box on initial evaluation, provide pt/family education and to maximize pt's level of independence in the home and community environment.     Patient's spiritual, cultural and educational needs considered and pt agreeable to plan of care and goals.     Anticipated barriers to physical therapy: standard, transportation    Goals: updated 3/17/2025   Short Term Goals (4 Weeks):   1. Pt to demonstrate improved PROM by 10% to allow pt to perform self care activities with increased ease. (  met)  2. Pt to demonstrate improved flexibility by a half grade to allow improved ADLs with increased ease.  (met)  3. Pt will report <2/10 pain within the R shoulder for ease with donning/doffing sling. ( Met )  4. Pt will report being independent with his/her HEP for maintenance of improvements gained during therapy sessions (met)  5. Pt to demonstrate improved functional ability with FOTO score >=48% . ( met, )     Long Term Goals (12 Weeks):   1.  Pt to demonstrate improved ROM to WNL, R=L, to allow pt to perform self care with increased ease. (Not met, progressing)  2. Pt to demonstrate improved flexibility by a full grade to allow improved postural alignment with increased ease. (Not met, progressing)  3. Pt will demonstrate >=4+/5 strength within the R shoulder for ease with house hold chores (Not met, progressing)  4. Pt to demonstrate improved functional ability with FOTO score >=67% . (Not met, progressing)  5. Pt independent with HEP and demonstrates good return technique. (Not met, progressing)    Plan     Cont to progress towards goals set by PT focusing on joint range of motion and scap control.      Nilsa Forrest, PT

## 2025-03-26 ENCOUNTER — CLINICAL SUPPORT (OUTPATIENT)
Dept: REHABILITATION | Facility: OTHER | Age: 65
End: 2025-03-26
Attending: PHYSICAL THERAPIST
Payer: COMMERCIAL

## 2025-03-26 DIAGNOSIS — G89.18 ACUTE POSTOPERATIVE PAIN OF RIGHT SHOULDER: ICD-10-CM

## 2025-03-26 DIAGNOSIS — Z98.890 POST-OPERATIVE STATE: Primary | ICD-10-CM

## 2025-03-26 DIAGNOSIS — M25.511 ACUTE POSTOPERATIVE PAIN OF RIGHT SHOULDER: ICD-10-CM

## 2025-03-26 PROCEDURE — 97530 THERAPEUTIC ACTIVITIES: CPT | Mod: PN | Performed by: PHYSICAL THERAPIST

## 2025-03-26 PROCEDURE — 97110 THERAPEUTIC EXERCISES: CPT | Mod: PN | Performed by: PHYSICAL THERAPIST

## 2025-03-26 PROCEDURE — 97112 NEUROMUSCULAR REEDUCATION: CPT | Mod: PN | Performed by: PHYSICAL THERAPIST

## 2025-03-26 NOTE — PROGRESS NOTES
"[facility]   Physical Therapy Treatment Note      Name: Santiago De Luna  Clinic Number: [id number]    Therapy Diagnosis:   Encounter Diagnoses   Name Primary?    Post-operative state Yes    Acute postoperative pain of right shoulder            Physician: Rivera Fernandes, *    Visit Date: 3/26/2025      Physician Orders: PT Eval and Treat: Massive size Rotator cuff protocol... Biceps tenodesis was not performed; if tenodesis was performed limit aggressive biceps flexion exercises for first 6 weeks     Medical Diagnosis from Referral: M75.101 (ICD-10-CM) - Rotator cuff syndrome, right S46.011A (ICD-10-CM) - Traumatic complete tear of right rotator cuff, initial encounter M25.811 (ICD-10-CM) - Impingement of right shoulder   Evaluation Date: 11/18/2024  Authorization Period Expiration: 12/31/2024  Plan of Care Expiration: updated to 4/14/2025  Progress Note Due: 4/21/25  Date of Surgery: 11/12/2024  Visit # / Visits authorized:  21/ 25   FOTO: 3/ 3 last issued 3/21     Precautions: Standard and post-op      Time In: 1515   Time Out: 1615  Total Billable Time: 60  minutes     PTA Visit #: 0/5       Subjective     Patient states:  doing well today, no pain. He's eager to get back to the gym and get back to riding his bike.     He was compliant with home exercise program.   Response to previous treatment: "good, soreness"   Functional change: no change reported     Pain: 0/10   Location: R shoulder     Objective       See progress note 3/21/2025           Treatment     Santiago received the treatments listed below:      therapeutic exercises to develop strength, endurance, ROM, flexibility, posture, and core stabilization for 9   minutes including:  Pulleys flexion and scaption x 3 min ea   Supine chest press with 1# dowel x 30   Supine serratus punch with 1# dowel x 30   Supine OH flexion with 1# dowel x 30   Cross body stretch supine 20 sec x 4   PROM R shoulder emphasis on IR   IR strap 20 sec " "x 4     manual therapy techniques: Joint mobilizations, Myofacial release, and Soft tissue Mobilization were applied to the: R shoulder for 3 minutes, including:  STM/MFR pecs, deltoids, posterior scap mm's (infra and supraspinatus, UT/LS)  GHJ glides all directions Gr I-III, GHJ distraction GrI-III     R lat MFR and MWM     neuromuscular re-education activities to improve: Balance, Coordination, Kinesthetic, Sense, Proprioception, and Posture for  15  minutes. The following activities were included:    +UBE x 6 min (3 min fwd/3 min bwd) for ROM and postural awareness  Ball on wall FLEX circles 15x cw/ccw (blue air ball)   Scap clocks red ( small rom due to difficulty) B 5x  Ball on wall FLEX alphabet (blue ball) x1   Ball on wall SCAP circles x 10 cw/ccw     Prone row 3 x 10 x 3#DB   Prone shoulder extension 3 x 10 x 0# DB   Prone shoulder abduction (T) x3x10 x 0#   Prone scaption (Y) x3x10 x 0#  DB  Attempted ER yellow band @ 90 deg abd x 5 - too painful/ difficult   90/90 robot x YTB, 2 x 10 IR, 1 x 10 in limited ROM ER  W yellow band 2 x 10       Sidelying ER with towel roll 3 x 8 x 1# DB -- changed to standing with small blue ball, 1 YTB, ROM to neutral (tolerable)  Standing wall pec stretch (uni) x 3x30"   +standing open books x2x8 - 1 set with arm ext, 1 set arm flexed    therapeutic activities to improve functional performance for 33 minutes, including:  Pt education regarding progression of post op protocol  +Quadruped weightshift 20x  +EOM plank shoulder taps 2 x 10  +Wall slide reach/roll/lift 10x    supine D2 pattern (no resistance) 3x10  1 #  Serratus Push up plus (w/ push up today) EOM (lowered hi-lo table) x 3x7     IR walkout x RTB x 2x10 x single handle  ER walkout x YTB x 1 x 8  (double handle)   +seated lat pull with bar 20# on Freemotion 2 x 10  standing resisted rows 7# (ea UE) on Freemotion   standing resisted  7 #  B shoulder extension free motion     Ice x 10 min to R shoulder -- defer today "     Patient Education and Home Exercises       Education provided:   - cont IR stretching, scap muscle strengthening as laureano     Written Home Exercises Provided: Pt instructed to continue prior HEP. Exercises were reviewed and Santiago was able to demonstrate them prior to the end of the session.  Santiago demonstrated good  understanding of the education provided. See Electronic Medical Record under Patient Instructions for exercises provided during therapy sessions    Assessment     Good tolerance to progression of therex today. Pt continues with weakness into ER particularly, but overall is demonstrating good improvement per protocol.     Santiago Is progressing well towards his goals.   Patient prognosis is Good.     Patient will continue to benefit from skilled outpatient physical therapy to address the deficits listed in the problem list box on initial evaluation, provide pt/family education and to maximize pt's level of independence in the home and community environment.     Patient's spiritual, cultural and educational needs considered and pt agreeable to plan of care and goals.     Anticipated barriers to physical therapy: standard, transportation    Goals: updated 3/17/2025   Short Term Goals (4 Weeks):   1. Pt to demonstrate improved PROM by 10% to allow pt to perform self care activities with increased ease. (  met)  2. Pt to demonstrate improved flexibility by a half grade to allow improved ADLs with increased ease.  (met)  3. Pt will report <2/10 pain within the R shoulder for ease with donning/doffing sling. ( Met )  4. Pt will report being independent with his/her HEP for maintenance of improvements gained during therapy sessions (met)  5. Pt to demonstrate improved functional ability with FOTO score >=48% . ( met, )     Long Term Goals (12 Weeks):   1. Pt to demonstrate improved ROM to WNL, R=L, to allow pt to perform self care with increased ease. (Not met, progressing)  2. Pt to demonstrate improved  flexibility by a full grade to allow improved postural alignment with increased ease. (Not met, progressing)  3. Pt will demonstrate >=4+/5 strength within the R shoulder for ease with house hold chores (Not met, progressing)  4. Pt to demonstrate improved functional ability with FOTO score >=67% . (Not met, progressing)  5. Pt independent with HEP and demonstrates good return technique. (Not met, progressing)    Plan     Cont to progress towards goals set by PT focusing on joint range of motion and scap control.      Felicita Anderson, PT, DPT

## 2025-03-28 ENCOUNTER — PROCEDURE VISIT (OUTPATIENT)
Dept: PODIATRY | Facility: CLINIC | Age: 65
End: 2025-03-28
Payer: COMMERCIAL

## 2025-03-28 ENCOUNTER — CLINICAL SUPPORT (OUTPATIENT)
Dept: REHABILITATION | Facility: OTHER | Age: 65
End: 2025-03-28
Payer: COMMERCIAL

## 2025-03-28 VITALS
HEART RATE: 62 BPM | WEIGHT: 209.44 LBS | DIASTOLIC BLOOD PRESSURE: 75 MMHG | BODY MASS INDEX: 28.37 KG/M2 | HEIGHT: 72 IN | SYSTOLIC BLOOD PRESSURE: 124 MMHG

## 2025-03-28 DIAGNOSIS — G89.18 ACUTE POSTOPERATIVE PAIN OF RIGHT SHOULDER: ICD-10-CM

## 2025-03-28 DIAGNOSIS — Z98.890 POST-OPERATIVE STATE: Primary | ICD-10-CM

## 2025-03-28 DIAGNOSIS — M79.675 TOE PAIN, LEFT: ICD-10-CM

## 2025-03-28 DIAGNOSIS — L60.0 INGROWN NAIL: Primary | ICD-10-CM

## 2025-03-28 DIAGNOSIS — M25.511 ACUTE POSTOPERATIVE PAIN OF RIGHT SHOULDER: ICD-10-CM

## 2025-03-28 PROCEDURE — 97112 NEUROMUSCULAR REEDUCATION: CPT | Mod: PN

## 2025-03-28 PROCEDURE — 97530 THERAPEUTIC ACTIVITIES: CPT | Mod: PN

## 2025-03-28 NOTE — PROCEDURES
Santiago De Luna is a 64 y.o. male  :  1960  [facility] MRN:    [id number]             Patient Active Problem List   Diagnosis    Traumatic complete tear of left rotator cuff    Vasomotor rhinitis    Non-traumatic rotator cuff tear, right    S/P arthroscopy of right shoulder    Chronic right shoulder pain    Impingement of right shoulder    Traumatic complete tear of right rotator cuff    Anxiety    Benign prostatic hyperplasia with nocturia    Snoring    Marijuana use    Alcohol consumption of more than two drinks per day    Macrocytic anemia    Post-operative state    Acute postoperative pain of right shoulder           Current Outpatient Medications on File Prior to Visit   Medication Sig Dispense Refill    tadalafiL (CIALIS) 5 MG tablet Take 1 tablet (5 mg total) by mouth daily as needed for Erectile Dysfunction. 30 tablet 11    tamsulosin (FLOMAX) 0.4 mg Cap Take 1 capsule (0.4 mg total) by mouth once daily. 90 capsule 3    celecoxib (CELEBREX) 200 MG capsule Take 1 capsule (200 mg total) by mouth 2 (two) times daily with meals. (breakfast and dinner) 60 capsule 0     No current facility-administered medications on file prior to visit.         Review of patient's allergies indicates:   Allergen Reactions    Indomethacin      Diarrhea and gas    Shellfish derived Itching, Rash and Swelling     Mussels,clams,shrimps- rash  Swelling of face      Hydrocodone Hives, Itching and Rash    Hydrocodone-acetaminophen Itching and Rash    Mupirocin Diarrhea, Nausea And Vomiting and Other (See Comments)         Problem List Items Addressed This Visit    None  Visit Diagnoses         Toe pain, left    -  Primary      Ingrown nail                    Nail Removal    Date/Time: 3/28/2025 12:30 PM    Performed by: Emili Charles DPM  Authorized by: Emili Charles DPM    Consent Done?:  Yes (Written)  Time out: Immediately prior to the procedure a time out was called    Prep: patient was prepped and draped in usual sterile  fashion    Location:     Location:  Left foot    Location detail:  Left big toe  Anesthesia:     Anesthesia:  Local infiltration    Local anesthetic:  Lidocaine 1% without epinephrine and bupivacaine 0.5% without epinephrine    Anesthetic total (ml):  2.5  Procedure Details:     Preparation:  Skin prepped with Betadine    Amount removed:  Partial    Side:  Medial    Wedge excision of skin of nail fold: No      Nail bed sutured?: No      Nail matrix removed:  Partial (phenol was used)    Removal method:  Phenol and alcohol    Dressing applied:  Antibiotic ointment and dressing applied    Patient tolerance:  Patient tolerated the procedure well with no immediate complications

## 2025-03-28 NOTE — PROGRESS NOTES
"[facility]   Physical Therapy Treatment Note      Name: Santiago De Luna  Clinic Number: [id number]    Therapy Diagnosis:   Encounter Diagnoses   Name Primary?    Post-operative state Yes    Acute postoperative pain of right shoulder            Physician: Rivera Fernandes, *    Visit Date: 3/31/2025      Physician Orders: PT Eval and Treat: Massive size Rotator cuff protocol... Biceps tenodesis was not performed; if tenodesis was performed limit aggressive biceps flexion exercises for first 6 weeks     Medical Diagnosis from Referral: M75.101 (ICD-10-CM) - Rotator cuff syndrome, right S46.011A (ICD-10-CM) - Traumatic complete tear of right rotator cuff, initial encounter M25.811 (ICD-10-CM) - Impingement of right shoulder   Evaluation Date: 11/18/2024  Authorization Period Expiration: 12/31/2024  Plan of Care Expiration: updated to 4/14/2025  Progress Note Due: 4/21/25  Date of Surgery: 11/12/2024  Visit # / Visits authorized:  21/ 25   FOTO: 3/ 3 last issued 3/21     Precautions: Standard and post-op      Time In: 9:00  Time Out: 10:00  Total Billable Time: 60  minutes     PTA Visit #: 0/5       Subjective     Patient states:  "I was so sore after last visit! She really worked me hard."      He was compliant with home exercise program.   Response to previous treatment: "good, soreness"   Functional change: no change reported     Pain: 0/10   Location: R shoulder     Objective       See progress note 3/21/2025           Treatment     Santiago received the treatments listed below:      therapeutic exercises to develop strength, endurance, ROM, flexibility, posture, and core stabilization for 00  minutes including:  Pulleys flexion and scaption x 3 min ea   Supine chest press with 1# dowel x 30   Supine serratus punch with 1# dowel x 30   Supine OH flexion with 1# dowel x 30   Cross body stretch supine 20 sec x 4   PROM R shoulder emphasis on IR   IR strap 20 sec x 4     manual therapy " "techniques: Joint mobilizations, Myofacial release, and Soft tissue Mobilization were applied to the: R shoulder for 3 minutes, including:  STM/MFR pecs, deltoids, posterior scap mm's (infra and supraspinatus, UT/LS)  GHJ glides all directions Gr I-III, GHJ distraction GrI-III     R lat MFR and MWM     neuromuscular re-education activities to improve: Balance, Coordination, Kinesthetic, Sense, Proprioception, and Posture for  25  minutes. The following activities were included:    UBE x 6 min (3 min fwd/3 min bwd) for ROM and postural awareness  +seated 90-90 flex + shoulder ER x 20 AROM  +seated 90-90 abd + shoulder ER x 20 AROM    Ball on wall FLEX circles 15x cw/ccw (blue air ball)   Scap clocks red ( small rom due to difficulty) B 5x  Ball on wall FLEX alphabet (blue ball) x1   Ball on wall SCAP circles x 10 cw/ccw     Prone row 3 x 10 x 3#DB   Prone shoulder extension 3 x 10 x 0# DB   Prone shoulder abduction (T) x3x10 x 0#   Prone scaption (Y) x3x10 x 0#  DB  Attempted ER yellow band @ 90 deg abd x 5 - too painful/ difficult   90/90 robot x YTB, 2 x 10 IR, 1 x 10 in limited ROM ER  W yellow band 2 x 10       Sidelying ER with towel roll 3 x 8 x 1# DB -- changed to standing with small blue ball, 1 YTB, ROM to neutral (tolerable)  Standing wall pec stretch (uni) x 3x30"   +standing open books x2x8 - 1 set with arm ext, 1 set arm flexed    therapeutic activities to improve functional performance for 35 minutes, including:  Pt education regarding progression of post op protocol  Quadruped weightshift 20x  EOM plank shoulder taps 2 x 10  Wall slide reach/roll/lift 10x    supine D2 pattern (no resistance) 3x10  1 #  Serratus Push up plus (w/ push up today) EOM (lowered hi-lo table) x 3x7     IR walkout x RTB x 2x10 x single handle  ER walkout x YTB x 1 x 8  (double handle)   seated lat pull with bar 20# on Freemotion 2 x 10  standing resisted rows 7# (ea UE) on Freemotion   standing resisted  7 #  B shoulder " extension free motion     Ice x 10 min to R shoulder -- defer today     Patient Education and Home Exercises       Education provided:   - cont IR stretching, scap muscle strengthening as laureano     Written Home Exercises Provided: Pt instructed to continue prior HEP. Exercises were reviewed and Santiago was able to demonstrate them prior to the end of the session.  Santiago demonstrated good  understanding of the education provided. See Electronic Medical Record under Patient Instructions for exercises provided during therapy sessions    Assessment     Modified 90-90 ER in flex and and abd AROM to improve ROM and flexibility without exacerbations of pain. Able to resume full program today with slower pacing due to soreness and fatigue.      Santiago Is progressing well towards his goals.   Patient prognosis is Good.     Patient will continue to benefit from skilled outpatient physical therapy to address the deficits listed in the problem list box on initial evaluation, provide pt/family education and to maximize pt's level of independence in the home and community environment.     Patient's spiritual, cultural and educational needs considered and pt agreeable to plan of care and goals.     Anticipated barriers to physical therapy: standard, transportation    Goals: updated 3/17/2025   Short Term Goals (4 Weeks):   1. Pt to demonstrate improved PROM by 10% to allow pt to perform self care activities with increased ease. (  met)  2. Pt to demonstrate improved flexibility by a half grade to allow improved ADLs with increased ease.  (met)  3. Pt will report <2/10 pain within the R shoulder for ease with donning/doffing sling. ( Met )  4. Pt will report being independent with his/her HEP for maintenance of improvements gained during therapy sessions (met)  5. Pt to demonstrate improved functional ability with FOTO score >=48% . ( met, )     Long Term Goals (12 Weeks):   1. Pt to demonstrate improved ROM to WNL, R=L, to allow pt to  perform self care with increased ease. (Not met, progressing)  2. Pt to demonstrate improved flexibility by a full grade to allow improved postural alignment with increased ease. (Not met, progressing)  3. Pt will demonstrate >=4+/5 strength within the R shoulder for ease with house hold chores (Not met, progressing)  4. Pt to demonstrate improved functional ability with FOTO score >=67% . (Not met, progressing)  5. Pt independent with HEP and demonstrates good return technique. (Not met, progressing)    Plan     Cont to progress towards goals set by PT focusing on joint range of motion and scap control.      Jo Ann Mei, PT

## 2025-04-01 ENCOUNTER — CLINICAL SUPPORT (OUTPATIENT)
Dept: REHABILITATION | Facility: OTHER | Age: 65
End: 2025-04-01
Payer: MEDICARE

## 2025-04-01 DIAGNOSIS — Z98.890 POST-OPERATIVE STATE: Primary | ICD-10-CM

## 2025-04-01 DIAGNOSIS — G89.18 ACUTE POSTOPERATIVE PAIN OF RIGHT SHOULDER: ICD-10-CM

## 2025-04-01 DIAGNOSIS — M25.511 ACUTE POSTOPERATIVE PAIN OF RIGHT SHOULDER: ICD-10-CM

## 2025-04-01 PROCEDURE — 97530 THERAPEUTIC ACTIVITIES: CPT | Mod: PN,CQ

## 2025-04-01 PROCEDURE — 97112 NEUROMUSCULAR REEDUCATION: CPT | Mod: PN,CQ

## 2025-04-01 PROCEDURE — 97110 THERAPEUTIC EXERCISES: CPT | Mod: PN,CQ

## 2025-04-01 NOTE — PROGRESS NOTES
"[facility]   Physical Therapy Treatment Note      Name: Santiago De Luna  Clinic Number: [id number]    Therapy Diagnosis:   Encounter Diagnoses   Name Primary?    Post-operative state Yes    Acute postoperative pain of right shoulder            Physician: Rivera Fernandes, *    Visit Date: 4/1/2025      Physician Orders: PT Jamel and Treat: Massive size Rotator cuff protocol... Biceps tenodesis was not performed; if tenodesis was performed limit aggressive biceps flexion exercises for first 6 weeks     Medical Diagnosis from Referral: M75.101 (ICD-10-CM) - Rotator cuff syndrome, right S46.011A (ICD-10-CM) - Traumatic complete tear of right rotator cuff, initial encounter M25.811 (ICD-10-CM) - Impingement of right shoulder   Evaluation Date: 11/18/2024  Authorization Period Expiration: 12/31/2024  Plan of Care Expiration: updated to 4/14/2025  Progress Note Due: 4/21/25  Date of Surgery: 11/12/2024  Visit # / Visits authorized:  23/ 25   FOTO: 3/ 3 last issued 3/21     Precautions: Standard and post-op      Time In: 9:15  Time Out: 10:10  Total Billable Time: 55  minutes     PTA Visit #: 1/5       Subjective     Patient states: he started back at the gym yesterday with upper body, and did light weight with rows and chest press. It felt really good to be back in the gym and he had good training effects.     He was compliant with home exercise program.   Response to previous treatment: "good, soreness"   Functional change: able to start rows and chest press in the gym with light weight and no pain     Pain: 0.01/10 "when reaching overhead; 0/10 at rest"   Location: R shoulder     Objective       See progress note 3/21/2025     Treatment     Santiago received the treatments listed below:      therapeutic exercises to develop strength, endurance, ROM, flexibility, posture, and core stabilization for 08  minutes including:  Pulleys flexion and scaption x 3 min ea   Supine chest press with 1# dowel x " "30   Supine serratus punch with 1# dowel x 30   Supine OH flexion with 1# dowel x 30   Cross body stretch supine 20 sec x 4   PROM R shoulder emphasis on IR   IR strap 20 sec x 4   UBE x 6 min (3 min fwd/3 min bwd) for ROM and postural awareness   Seated 90-90 flex + shoulder ER x 20 AROM   Seated 90-90 abd + shoulder ER x 15 AROM     manual therapy techniques: Joint mobilizations, Myofacial release, and Soft tissue Mobilization were applied to the: R shoulder for 00 minutes, including:  STM/MFR pecs, deltoids, posterior scap mm's (infra and supraspinatus, UT/LS)  GHJ glides all directions Gr I-III, GHJ distraction GrI-III     R lat MFR and MWM     neuromuscular re-education activities to improve: Balance, Coordination, Kinesthetic, Sense, Proprioception, and Posture for  08  minutes. The following activities were included:    Ball on wall FLEX circles 15x cw/ccw (blue air ball)   Scap clocks red ( small rom due to difficulty) B 5x  Ball on wall FLEX alphabet (blue ball) x1   Ball on wall SCAP circles x 10 cw/ccw     Prone row 3 x 10 x 3#DB   Prone shoulder extension 3 x 10 x 0# DB   Prone shoulder abduction (T) x3x10 x 0#   Prone scaption (Y) x3x10 x 0#  DB  Attempted ER yellow band @ 90 deg abd x 5 - too painful/ difficult   90/90 robot x YTB, 2 x 10 IR (double handle), 2x8 in limited ROM ER (single handle)   W yellow band 2 x 10       Sidelying ER with towel roll 3 x 8 x 1# DB -- changed to standing with small blue ball, 1 YTB, ROM to neutral (tolerable)  Standing wall pec stretch (uni) x 3x30"   +standing open books x2x8 - 1 set with arm ext, 1 set arm flexed    therapeutic activities to improve functional performance for 39 minutes, including:  Pt education regarding progression of post op protocol  Quadruped weightshift x 20 x 3" hold   EOM plank alt shoulder taps 2 x 10   + EOM plank alt leg extension 2 x 15 ea   Wall slide reach/roll/lift 2 x 10     supine D2 pattern (no resistance) 3x10 vs 1#   Serratus " Push up plus (w/ push up today) EOM (lowered hi-lo table) x 2 x 10     IR walkout x RTB x 2x10 x single handle  ER walkout x YTB x 1 x 8  (double handle)   seated lat pull with bar 20# on Freemotion 3 x 10   standing resisted rows 7# (ea UE) on Freemotion 3 x 10 R only today   standing resisted  7#  B shoulder extension free motion 2 x 10     Ice x 10 min to R shoulder -- defer today     Patient Education and Home Exercises       Education provided:   - cont IR stretching, scap muscle strengthening as laureano     Written Home Exercises Provided: Pt instructed to continue prior HEP. Exercises were reviewed and Santiago was able to demonstrate them prior to the end of the session.  Santiago demonstrated good  understanding of the education provided. See Electronic Medical Record under Patient Instructions for exercises provided during therapy sessions    Assessment     Overall good tolerance to treatment with no adverse effects. Pt required modification to exercise in a tolerable range with 90-90 ER in flex/abd AROM and with robot arm exercises, with good training effects achieved. Progressed sets and reps today to target strength and endurance. Initiated shoulder stability with dual tasks at EOM, which pt had good global mm juddering. Continue to monitor and progress pt as tolerated.     Santiago Is progressing well towards his goals.   Patient prognosis is Good.     Patient will continue to benefit from skilled outpatient physical therapy to address the deficits listed in the problem list box on initial evaluation, provide pt/family education and to maximize pt's level of independence in the home and community environment.     Patient's spiritual, cultural and educational needs considered and pt agreeable to plan of care and goals.     Anticipated barriers to physical therapy: standard, transportation    Goals: updated 04/01/2025    Short Term Goals (4 Weeks):   1. Pt to demonstrate improved PROM by 10% to allow pt to perform  self care activities with increased ease. (  met)  2. Pt to demonstrate improved flexibility by a half grade to allow improved ADLs with increased ease.  (met)  3. Pt will report <2/10 pain within the R shoulder for ease with donning/doffing sling. ( Met )  4. Pt will report being independent with his/her HEP for maintenance of improvements gained during therapy sessions (met)  5. Pt to demonstrate improved functional ability with FOTO score >=48% . ( met, )     Long Term Goals (12 Weeks):   1. Pt to demonstrate improved ROM to WNL, R=L, to allow pt to perform self care with increased ease. (Not met, progressing)  2. Pt to demonstrate improved flexibility by a full grade to allow improved postural alignment with increased ease. (Not met, progressing)  3. Pt will demonstrate >=4+/5 strength within the R shoulder for ease with house hold chores (Not met, progressing)  4. Pt to demonstrate improved functional ability with FOTO score >=67% . (Not met, progressing)  5. Pt independent with HEP and demonstrates good return technique. (Not met, progressing)    Plan     Cont to progress towards goals set by PT focusing on joint range of motion and scap control.      Lizette Mabry, PTA

## 2025-04-02 ENCOUNTER — TELEPHONE (OUTPATIENT)
Dept: PODIATRY | Facility: CLINIC | Age: 65
End: 2025-04-02
Payer: MEDICARE

## 2025-04-02 NOTE — TELEPHONE ENCOUNTER
----- Message from Tabitha sent at 4/2/2025  8:05 AM CDT -----  Type:  Patient Returning CallWho Called:Mr De Luna Who Left Message for Patient:?Does the patient know what this is regarding?:yes Would the patient rather a call back or a response via [facility]? Call Best Call Back Number:[phone] Information: He states someone called him to tell the provider will not be in in the morning of 04/11 he states he can do the 11:00 appt on 04/11 please call

## 2025-04-02 NOTE — TELEPHONE ENCOUNTER
Staff contacted and spoke with patient confirming appointment for April 11th at 11:00 am., with Dr. Charles at the [place] location.

## 2025-04-03 ENCOUNTER — CLINICAL SUPPORT (OUTPATIENT)
Dept: REHABILITATION | Facility: OTHER | Age: 65
End: 2025-04-03
Payer: MEDICARE

## 2025-04-03 ENCOUNTER — DOCUMENTATION ONLY (OUTPATIENT)
Dept: REHABILITATION | Facility: OTHER | Age: 65
End: 2025-04-03
Payer: MEDICARE

## 2025-04-03 DIAGNOSIS — Z98.890 POST-OPERATIVE STATE: Primary | ICD-10-CM

## 2025-04-03 DIAGNOSIS — M25.511 ACUTE POSTOPERATIVE PAIN OF RIGHT SHOULDER: ICD-10-CM

## 2025-04-03 DIAGNOSIS — G89.18 ACUTE POSTOPERATIVE PAIN OF RIGHT SHOULDER: ICD-10-CM

## 2025-04-03 PROCEDURE — 97530 THERAPEUTIC ACTIVITIES: CPT | Mod: PN | Performed by: PHYSICAL THERAPIST

## 2025-04-03 NOTE — PROGRESS NOTES
Physical Therapist and Physical Therapist Assistant met face to face to discuss patient's treatment plan and progress towards established goals. Pt will be seen by a physical therapist minimally every 6th visit or every 30 days.    Lizette Mabry PTA  04/03/2025

## 2025-04-03 NOTE — PROGRESS NOTES
"[facility]   Physical Therapy Treatment Note      Name: Santiago De Luna  Clinic Number: [id number]    Therapy Diagnosis:   Encounter Diagnoses   Name Primary?    Post-operative state Yes    Acute postoperative pain of right shoulder            Physician: Rivera Fernandes, *    Visit Date: 4/3/2025      Physician Orders: PT Eval and Treat: Massive size Rotator cuff protocol... Biceps tenodesis was not performed; if tenodesis was performed limit aggressive biceps flexion exercises for first 6 weeks     Medical Diagnosis from Referral: M75.101 (ICD-10-CM) - Rotator cuff syndrome, right S46.011A (ICD-10-CM) - Traumatic complete tear of right rotator cuff, initial encounter M25.811 (ICD-10-CM) - Impingement of right shoulder   Evaluation Date: 11/18/2024  Authorization Period Expiration: 12/31/2024  Plan of Care Expiration: updated to 4/14/2025  Progress Note Due: 4/21/25  Date of Surgery: 11/12/2024  Visit # / Visits authorized:  24/ 25   FOTO: 3/ 3 last issued 3/21     Precautions: Standard and post-op      Time In: 9:15  Time Out: 1003  Total Billable Time: 30  minutes     PTA Visit #: 0/5       Subjective     Patient states: feeling good, went to the gym and did some upper body work today. Feels a little sore today but just like he got a good work out. Pt requests to leave early today, forgot an appointment.     He was compliant with home exercise program.   Response to previous treatment: "good, soreness"   Functional change: able to start rows and chest press in the gym with light weight and no pain     Pain: 0.01/10 "when reaching overhead; 0/10 at rest"   Location: R shoulder     Objective       See progress note 3/21/2025     Treatment     Santiago received the treatments listed below:      therapeutic exercises to develop strength, endurance, ROM, flexibility, posture, and core stabilization for 10  minutes including:  Pulleys flexion and scaption x 3 min ea   Supine chest press with 1# " "dowel x 30   Supine serratus punch with 1# dowel x 30   Supine OH flexion with 1# dowel x 30   Cross body stretch supine 20 sec x 4   PROM R shoulder emphasis on IR   IR strap 20 sec x 4   UBE x 8 min (4 min fwd/4 min bwd) for ROM and postural awareness   Seated 90-90 flex + shoulder ER x 20 AROM   Seated 90-90 abd + shoulder ER x 15 AROM     manual therapy techniques: Joint mobilizations, Myofacial release, and Soft tissue Mobilization were applied to the: R shoulder for 00 minutes, including:  STM/MFR pecs, deltoids, posterior scap mm's (infra and supraspinatus, UT/LS)  GHJ glides all directions Gr I-III, GHJ distraction GrI-III     R lat MFR and MWM     neuromuscular re-education activities to improve: Balance, Coordination, Kinesthetic, Sense, Proprioception, and Posture for  08  minutes. The following activities were included:    Ball on wall FLEX circles 15x cw/ccw (blue air ball)   Scap clocks red ( small rom due to difficulty) B 5x  Ball on wall FLEX alphabet (blue ball) x1   Ball on wall SCAP circles x 10 cw/ccw     Prone row 3 x 10 x 3#DB   Prone shoulder extension 3 x 10 x 0# DB   Prone shoulder abduction (T) x3x10 x 0#   Prone scaption (Y) x3x10 x 0#  DB  Attempted ER yellow band @ 90 deg abd x 5 - too painful/ difficult   90/90 robot x YTB, 2 x 10 IR (double handle), 2x10 in limited ROM ER (single handle)   W yellow band 2 x 10       Sidelying ER with towel roll 3 x 8 x 1# DB -- changed to standing with small blue ball, 1 YTB, ROM to neutral (tolerable)  Standing wall pec stretch (uni) x 3x30"   +standing open books x2x8 - 1 set with arm ext, 1 set arm flexed    therapeutic activities to improve functional performance for 30 minutes, including:  Pt education regarding progression of post op protocol  Quadruped weightshift x 20 x 3" hold   EOM plank alt shoulder taps 2 x 10   + EOM plank alt leg extension 2 x 15 ea   Wall slide reach/roll/lift 2 x 10     supine D2 pattern (no resistance) 3x10 vs 1# "   Serratus Push up plus (w/ push up today) EOM (lowered hi-lo table) x 2 x 10     IR walkout x RTB x 2x10 x single handle  ER walkout x YTB x 1 x 8  (double handle)   seated lat pull with bar 20# on Freemotion 3 x 10   standing resisted rows 7# (ea UE) on Freemotion 3 x 10 R only today   standing resisted  7#  B shoulder extension free motion 2 x 10     Ice x 10 min to R shoulder -- defer today     Patient Education and Home Exercises       Education provided:   - cont IR stretching, scap muscle strengthening as laureano     Written Home Exercises Provided: Pt instructed to continue prior HEP. Exercises were reviewed and Santiago was able to demonstrate them prior to the end of the session.  Santiago demonstrated good  understanding of the education provided. See Electronic Medical Record under Patient Instructions for exercises provided during therapy sessions    Assessment     Pt is progressing very well with therapy. He has begun doing some gym exercise, reminded of protocol parameters. He continues to gradually improve with strength and stability, continues with fatigue in elevated positions.     Santiago Is progressing well towards his goals.   Patient prognosis is Good.     Patient will continue to benefit from skilled outpatient physical therapy to address the deficits listed in the problem list box on initial evaluation, provide pt/family education and to maximize pt's level of independence in the home and community environment.     Patient's spiritual, cultural and educational needs considered and pt agreeable to plan of care and goals.     Anticipated barriers to physical therapy: standard, transportation    Goals: updated 04/03/2025    Short Term Goals (4 Weeks):   1. Pt to demonstrate improved PROM by 10% to allow pt to perform self care activities with increased ease. (  met)  2. Pt to demonstrate improved flexibility by a half grade to allow improved ADLs with increased ease.  (met)  3. Pt will report <2/10 pain  within the R shoulder for ease with donning/doffing sling. ( Met )  4. Pt will report being independent with his/her HEP for maintenance of improvements gained during therapy sessions (met)  5. Pt to demonstrate improved functional ability with FOTO score >=48% . ( met, )     Long Term Goals (12 Weeks):   1. Pt to demonstrate improved ROM to WNL, R=L, to allow pt to perform self care with increased ease. (Not met, progressing)  2. Pt to demonstrate improved flexibility by a full grade to allow improved postural alignment with increased ease. (Not met, progressing)  3. Pt will demonstrate >=4+/5 strength within the R shoulder for ease with house hold chores (Not met, progressing)  4. Pt to demonstrate improved functional ability with FOTO score >=67% . (Not met, progressing)  5. Pt independent with HEP and demonstrates good return technique. (Not met, progressing)    Plan     Cont to progress towards goals set by PT focusing on joint range of motion and scap control.      Felicita Anderson, PT, DPT

## 2025-04-10 ENCOUNTER — DOCUMENTATION ONLY (OUTPATIENT)
Dept: REHABILITATION | Facility: OTHER | Age: 65
End: 2025-04-10

## 2025-04-10 ENCOUNTER — CLINICAL SUPPORT (OUTPATIENT)
Dept: REHABILITATION | Facility: OTHER | Age: 65
End: 2025-04-10
Attending: PHYSICAL THERAPIST
Payer: MEDICARE

## 2025-04-10 DIAGNOSIS — Z98.890 POST-OPERATIVE STATE: Primary | ICD-10-CM

## 2025-04-10 DIAGNOSIS — G89.18 ACUTE POSTOPERATIVE PAIN OF RIGHT SHOULDER: ICD-10-CM

## 2025-04-10 DIAGNOSIS — M25.511 ACUTE POSTOPERATIVE PAIN OF RIGHT SHOULDER: ICD-10-CM

## 2025-04-10 PROCEDURE — 97112 NEUROMUSCULAR REEDUCATION: CPT | Mod: PN,CQ

## 2025-04-10 PROCEDURE — 97110 THERAPEUTIC EXERCISES: CPT | Mod: PN,CQ

## 2025-04-10 PROCEDURE — 97530 THERAPEUTIC ACTIVITIES: CPT | Mod: PN,CQ

## 2025-04-10 NOTE — PROGRESS NOTES
"[facility]   Physical Therapy Treatment Note      Name: Santiago De Luna  [facility] Number: [id number]    Therapy Diagnosis:   Encounter Diagnoses   Name Primary?    Post-operative state Yes    Acute postoperative pain of right shoulder            Physician: Rivera Fernandes, *    Visit Date: 4/10/2025      Physician Orders: PT Jamel and Treat: Massive size Rotator cuff protocol... Biceps tenodesis was not performed; if tenodesis was performed limit aggressive biceps flexion exercises for first 6 weeks     Medical Diagnosis from Referral: M75.101 (ICD-10-CM) - Rotator cuff syndrome, right S46.011A (ICD-10-CM) - Traumatic complete tear of right rotator cuff, initial encounter M25.811 (ICD-10-CM) - Impingement of right shoulder   Evaluation Date: 11/18/2024  Authorization Period Expiration: 12/31/2024  Plan of Care Expiration: updated to 4/14/2025  Progress Note Due: 4/21/25  Date of Surgery: 11/12/2024  Visit # / Visits authorized:  25/ 41    FOTO: 3/ 3 last issued 3/21     Precautions: Standard and post-op      Time In: 1:15 pm   Time Out: 2:10 pm   Total Billable Time: 55 minutes     PTA Visit #: 1/5       Subjective     Patient states: feeling good today. Was battling food poisoning last visit and had to cancel, but is feeling better today. Able to reach the top shelf of his cabinets.     He was compliant with home exercise program.   Response to previous treatment: "good, soreness"   Functional change: able to start rows and chest press in the gym with light weight and no pain; able to change a light bulb.     Pain: 0/10 "when reaching overhead; 0/10 at rest"   Location: R shoulder     Objective       See progress note 3/21/2025     Treatment     Santiago received the treatments listed below:      therapeutic exercises to develop strength, endurance, ROM, flexibility, posture, and core stabilization for 09 minutes including:  Pulleys flexion and scaption x 3 min ea   Supine chest press with 1# " "dowel x 30   Supine serratus punch with 1# dowel x 30   Supine OH flexion with 1# dowel x 30   Cross body stretch supine 20 sec x 4   PROM R shoulder emphasis on IR   IR strap 20 sec x 4   UBE x 8 min (4 min fwd/4 min bwd) for ROM and postural awareness   Seated 90-90 flex + shoulder ER x 20 AROM     manual therapy techniques: Joint mobilizations, Myofacial release, and Soft tissue Mobilization were applied to the: R shoulder for 00 minutes, including:  STM/MFR pecs, deltoids, posterior scap mm's (infra and supraspinatus, UT/LS)  GHJ glides all directions Gr I-III, GHJ distraction GrI-III     R lat MFR and MWM     neuromuscular re-education activities to improve: Balance, Coordination, Kinesthetic, Sense, Proprioception, and Posture for  08  minutes. The following activities were included:    Ball on wall FLEX circles 15x cw/ccw (blue air ball)   Scap clocks red ( small rom due to difficulty) B 5x  Ball on wall FLEX alphabet (blue ball) x1   Ball on wall SCAP circles x 10 cw/ccw     Prone row 3 x 10 x 3#DB   Prone shoulder extension 3 x 10 x 0# DB   Prone shoulder abduction (T) x3x10 x 0#   Prone scaption (Y) x3x10 x 0#  DB  Attempted ER yellow band @ 90 deg abd x 5 - too painful/ difficult   90/90 robot x YTB, 2 x 10 IR (double handle), 2x10 in limited ROM ER (single handle)   W yellow band 2 x 10   Seated 90-90 abd + shoulder ER x 1x8, 1x7 AROM -- verbal cues for scapular retraction       Sidelying ER with towel roll 3 x 8 x 1# DB -- changed to standing with small blue ball, 1 YTB, ROM to neutral (tolerable)  Standing wall pec stretch (uni) x 3x30"   +standing open books x2x8 - 1 set with arm ext, 1 set arm flexed    therapeutic activities to improve functional performance for 38 minutes, including:  Pt education regarding progression of post op protocol  Quadruped weightshift x 20 x 3" hold   EOM plank alt shoulder taps 2 x 10   EOM plank alt leg extension 2 x 15 ea   Wall slide reach/roll/lift 2 x 10     supine " D2 pattern (no resistance) 3x10 vs 1#   Serratus Push up plus (w/ push up today) EOM (lowered hi-lo table) x 2 x 10     + prone on PB:   Zach I/T/Y 2 x 10   Zach Rows vs 2# 3 x 10     + trampoline toss with 1 hand catch (green weighted ball) x 20     IR walkout x RTB x 2x10 x single handle   ER walkout x YTB x 1 x 8  (double handle)   seated lat pull with bar 20# on Freemotion 3 x 10   standing resisted rows 7# (ea UE) on Freemotion 3 x 10 R only today   standing resisted  7#  B shoulder extension free motion 2 x 10     Ice x 10 min to R shoulder -- defer today     Patient Education and Home Exercises       Education provided:   - cont IR stretching, scap muscle strengthening as laureano     Written Home Exercises Provided: Pt instructed to continue prior HEP. Exercises were reviewed and Santiago was able to demonstrate them prior to the end of the session.  Santiago demonstrated good  understanding of the education provided. See Electronic Medical Record under Patient Instructions for exercises provided during therapy sessions    Assessment     Progressed speed movements and prone scapular stabilization today per MD protocol, which pt had good training effects, mm fatigue, and no adverse effects. Minimal tactile cues for lat and lower trap recruitment throughout, which pt was able to correct when cued. Continue to monitor and progress pt as tolerated.     Santiago Is progressing well towards his goals.   Patient prognosis is Good.     Patient will continue to benefit from skilled outpatient physical therapy to address the deficits listed in the problem list box on initial evaluation, provide pt/family education and to maximize pt's level of independence in the home and community environment.     Patient's spiritual, cultural and educational needs considered and pt agreeable to plan of care and goals.     Anticipated barriers to physical therapy: standard, transportation    Goals: updated 04/10/2025    Short Term Goals (4 Weeks):    1. Pt to demonstrate improved PROM by 10% to allow pt to perform self care activities with increased ease. (  met)  2. Pt to demonstrate improved flexibility by a half grade to allow improved ADLs with increased ease.  (met)  3. Pt will report <2/10 pain within the R shoulder for ease with donning/doffing sling. ( Met )  4. Pt will report being independent with his/her HEP for maintenance of improvements gained during therapy sessions (met)  5. Pt to demonstrate improved functional ability with FOTO score >=48% . ( met, )     Long Term Goals (12 Weeks):   1. Pt to demonstrate improved ROM to WNL, R=L, to allow pt to perform self care with increased ease. (Not met, progressing)  2. Pt to demonstrate improved flexibility by a full grade to allow improved postural alignment with increased ease. (Not met, progressing)  3. Pt will demonstrate >=4+/5 strength within the R shoulder for ease with house hold chores (Not met, progressing)  4. Pt to demonstrate improved functional ability with FOTO score >=67% . (Not met, progressing)  5. Pt independent with HEP and demonstrates good return technique. (Not met, progressing)    Plan     Cont to progress towards goals set by PT focusing on joint range of motion and scap control.      Lizette Mabry, PTA

## 2025-04-10 NOTE — PROGRESS NOTES
Physical Therapist and Physical Therapist Assistant met face to face to discuss patient's treatment plan and progress towards established goals. Pt will be seen by a physical therapist minimally every 6th visit or every 30 days.    Lizette Mabry PTA  04/10/2025

## 2025-04-11 ENCOUNTER — OFFICE VISIT (OUTPATIENT)
Dept: PODIATRY | Facility: CLINIC | Age: 65
End: 2025-04-11
Payer: MEDICARE

## 2025-04-11 VITALS
HEART RATE: 52 BPM | BODY MASS INDEX: 28.37 KG/M2 | WEIGHT: 209.44 LBS | SYSTOLIC BLOOD PRESSURE: 113 MMHG | HEIGHT: 72 IN | DIASTOLIC BLOOD PRESSURE: 75 MMHG

## 2025-04-11 DIAGNOSIS — L60.0 INGROWN NAIL: ICD-10-CM

## 2025-04-11 DIAGNOSIS — Z09 FOLLOW-UP EXAM AFTER TREATMENT: Primary | ICD-10-CM

## 2025-04-11 DIAGNOSIS — M79.675 TOE PAIN, LEFT: ICD-10-CM

## 2025-04-11 PROCEDURE — 99999 PR PBB SHADOW E&M-EST. PATIENT-LVL III: CPT | Mod: PBBFAC,,, | Performed by: PODIATRIST

## 2025-04-11 PROCEDURE — 99213 OFFICE O/P EST LOW 20 MIN: CPT | Mod: PBBFAC,PN | Performed by: PODIATRIST

## 2025-04-11 NOTE — PROGRESS NOTES
Chief Complaint   Patient presents with    Post Op     Post Op P&A         S:   64 y.o. male returns for follow up s/p ingrown toenail procedure - left hallux.   Patient is healing well, no complaints.  The patient has been keeping the toe covered as instructed.   Patient has no pain today. Patient denies constitutional symptoms.         O:   Vitals:    04/11/25 1105   BP: 113/75   Pulse: (!) 52   Weight: 95 kg (209 lb 7 oz)   Height: 6' (1.829 m)        S/p  left  hallux ingrown toenail matrixectomy. no erythema;  no ascending cellulitis.   no swelling. No drainage.    The site is healing well. No signs of infection.   Pedal pulses are palpable.   Range of motion intact.   no  tenderness to palpation.             A/P:     1. Follow-up exam after treatment        2. Ingrown nail        3. Toe pain, left             S/p left hallux ingrown toenail chemical matrixectomy.  Patient is healing well.      Return to regular activities as tolerated.     Call or return to clinic prn if these symptoms worsen or fail to improve as anticipated.         General nail care measures for abnormal nails include:  Keeping nails trimmed short, but avoid overzealous trimming  Avoiding trauma   Avoiding contact irritants   Keeping nails dry (avoiding wet work)  Avoiding all nail cosmetics  Wearing shoes that fit well at the toe box.  Avoid tight shoes.

## 2025-04-14 ENCOUNTER — CLINICAL SUPPORT (OUTPATIENT)
Dept: REHABILITATION | Facility: OTHER | Age: 65
End: 2025-04-14
Payer: MEDICARE

## 2025-04-14 DIAGNOSIS — G89.18 ACUTE POSTOPERATIVE PAIN OF RIGHT SHOULDER: ICD-10-CM

## 2025-04-14 DIAGNOSIS — Z98.890 POST-OPERATIVE STATE: Primary | ICD-10-CM

## 2025-04-14 DIAGNOSIS — M25.511 ACUTE POSTOPERATIVE PAIN OF RIGHT SHOULDER: ICD-10-CM

## 2025-04-14 PROCEDURE — 97530 THERAPEUTIC ACTIVITIES: CPT | Mod: PN

## 2025-04-14 NOTE — PROGRESS NOTES
"[facility]   Physical Therapy Treatment Note      Name: Santiago De Luna  Clinic Number: [id number]    Therapy Diagnosis:   Encounter Diagnoses   Name Primary?    Post-operative state Yes    Acute postoperative pain of right shoulder            Physician: Rivera Fernandes, *    Visit Date: 4/14/2025      Physician Orders: PT Jamel and Treat: Massive size Rotator cuff protocol... Biceps tenodesis was not performed; if tenodesis was performed limit aggressive biceps flexion exercises for first 6 weeks     Medical Diagnosis from Referral: M75.101 (ICD-10-CM) - Rotator cuff syndrome, right S46.011A (ICD-10-CM) - Traumatic complete tear of right rotator cuff, initial encounter M25.811 (ICD-10-CM) - Impingement of right shoulder   Evaluation Date: 11/18/2024  Authorization Period Expiration: 12/31/2024  Plan of Care Expiration: updated to 4/14/2025  Progress Note Due: 4/21/25  Date of Surgery: 11/12/2024  Visit # / Visits authorized:  25/ 41    FOTO: 3/ 3 last issued 3/21     Precautions: Standard and post-op      Time In: 9:15 am   Time Out: 10:01 am   Total Billable Time: 46 minutes     PTA Visit #: 1/5       Subjective     Patient states: he has been doing more at the gym with resistance and finds that it's really helped. Reaching all directions, including behind the back, is greatly improving.    He was compliant with home exercise program.   Response to previous treatment: "good, soreness"   Functional change: able to start rows and chest press in the gym with light weight and no pain; able to change a light bulb.     Pain: 0/10 "when reaching overhead; 0/10 at rest"   Location: R shoulder     Objective       See progress note 3/21/2025     Treatment     Santiago received the treatments listed below:      therapeutic exercises to develop strength, endurance, ROM, flexibility, posture, and core stabilization for 09 minutes including:  Pulleys flexion and scaption x 3 min ea   Supine chest press " "with 1# dowel x 30   Supine serratus punch with 1# dowel x 30   Supine OH flexion with 1# dowel x 30   Cross body stretch supine 20 sec x 4   PROM R shoulder emphasis on IR   IR strap 20 sec x 4   UBE x 8 min (4 min fwd/4 min bwd) for ROM and postural awareness   Seated 90-90 flex + shoulder ER x 20 AROM     manual therapy techniques: Joint mobilizations, Myofacial release, and Soft tissue Mobilization were applied to the: R shoulder for 00 minutes, including:  STM/MFR pecs, deltoids, posterior scap mm's (infra and supraspinatus, UT/LS)  GHJ glides all directions Gr I-III, GHJ distraction GrI-III     R lat MFR and MWM     neuromuscular re-education activities to improve: Balance, Coordination, Kinesthetic, Sense, Proprioception, and Posture for  08  minutes. The following activities were included:    Ball on wall FLEX circles 15x cw/ccw (blue air ball)   Scap clocks red ( small rom due to difficulty) B 5x  Ball on wall FLEX alphabet (blue ball) x1   Ball on wall SCAP circles x 10 cw/ccw     Prone row 3 x 10 x 3#DB   Prone shoulder extension 3 x 10 x 0# DB   Prone shoulder abduction (T) x3x10 x 0#   Prone scaption (Y) x3x10 x 0#  DB  Attempted ER yellow band @ 90 deg abd x 5 - too painful/ difficult   90/90 robot x YTB, 2 x 10 IR (double handle), 2x10 in limited ROM ER (single handle)   W yellow band 2 x 10   Seated 90-90 abd + shoulder ER x 1x8, 1x7 AROM -- verbal cues for scapular retraction       Sidelying ER with towel roll 3 x 8 x 1# DB -- changed to standing with small blue ball, 1 YTB, ROM to neutral (tolerable)  Standing wall pec stretch (uni) x 3x30"   +standing open books x2x8 - 1 set with arm ext, 1 set arm flexed    therapeutic activities to improve functional performance for 38 minutes, including:  Pt education regarding progression of post op protocol  Quadruped weightshift x 20 x 3" hold   EOM plank alt shoulder taps 2 x 10   EOM plank alt leg extension 2 x 15 ea   Wall slide reach/roll/lift 2 x 10 "     supine D2 pattern (no resistance) 3x10 vs 1#   Serratus Push up plus (w/ push up today) EOM (lowered hi-lo table) x 2 x 10     prone on PB:   Zach I/T/Y 3 x 10   Zach Rows vs 2# 3 x 10     trampoline toss with 1 hand catch (green weighted ball) x 20     IR walkout x RTB x 2x10 x single handle   ER walkout x YTB x 1 x 8  (double handle)   seated lat pull with bar 20# on Freemotion 3 x 10   standing resisted rows 7# (ea UE) on Freemotion 3 x 10 R only today   standing resisted  7#  B shoulder extension free motion 2 x 10     Ice x 10 min to R shoulder -- defer today     Patient Education and Home Exercises       Education provided:   - cont IR stretching, scap muscle strengthening as laureano     Written Home Exercises Provided: Pt instructed to continue prior HEP. Exercises were reviewed and Santiago was able to demonstrate them prior to the end of the session.  Santiago demonstrated good  understanding of the education provided. See Electronic Medical Record under Patient Instructions for exercises provided during therapy sessions    Assessment     Able to perform increased reps with several exercises today with good tolerance, indicating good improvements in mm activation and endurance.  Santiago Is progressing well towards his goals.   Patient prognosis is Good.     Patient will continue to benefit from skilled outpatient physical therapy to address the deficits listed in the problem list box on initial evaluation, provide pt/family education and to maximize pt's level of independence in the home and community environment.     Patient's spiritual, cultural and educational needs considered and pt agreeable to plan of care and goals.     Anticipated barriers to physical therapy: standard, transportation    Goals: updated 04/14/2025    Short Term Goals (4 Weeks):   1. Pt to demonstrate improved PROM by 10% to allow pt to perform self care activities with increased ease. (  met)  2. Pt to demonstrate improved flexibility by a  half grade to allow improved ADLs with increased ease.  (met)  3. Pt will report <2/10 pain within the R shoulder for ease with donning/doffing sling. ( Met )  4. Pt will report being independent with his/her HEP for maintenance of improvements gained during therapy sessions (met)  5. Pt to demonstrate improved functional ability with FOTO score >=48% . ( met, )     Long Term Goals (12 Weeks):   1. Pt to demonstrate improved ROM to WNL, R=L, to allow pt to perform self care with increased ease. (Not met, progressing)  2. Pt to demonstrate improved flexibility by a full grade to allow improved postural alignment with increased ease. (Not met, progressing)  3. Pt will demonstrate >=4+/5 strength within the R shoulder for ease with house hold chores (Not met, progressing)  4. Pt to demonstrate improved functional ability with FOTO score >=67% . (Not met, progressing)  5. Pt independent with HEP and demonstrates good return technique. (Not met, progressing)    Plan     Cont to progress towards goals set by PT focusing on joint range of motion and scap control.      Jo Ann Mei, PT

## 2025-04-15 ENCOUNTER — TELEPHONE (OUTPATIENT)
Dept: SPORTS MEDICINE | Facility: CLINIC | Age: 65
End: 2025-04-15
Payer: MEDICARE

## 2025-04-15 NOTE — TELEPHONE ENCOUNTER
LVM  to the patient to contact the office to R/S the appt.    ----- Message from Candi sent at 4/15/2025  1:14 PM CDT -----  Regarding: RETURNIGN A CALL FROM STAFF PT IS NOT SURE WHO CALLED  Contact: PT  Confirmed contact info below:Contact Name: Santiago De LunaPhone Number: [phone]

## 2025-04-23 ENCOUNTER — CLINICAL SUPPORT (OUTPATIENT)
Dept: REHABILITATION | Facility: OTHER | Age: 65
End: 2025-04-23
Attending: PHYSICAL THERAPIST
Payer: MEDICARE

## 2025-04-23 DIAGNOSIS — Z98.890 POST-OPERATIVE STATE: Primary | ICD-10-CM

## 2025-04-23 DIAGNOSIS — G89.18 ACUTE POSTOPERATIVE PAIN OF RIGHT SHOULDER: ICD-10-CM

## 2025-04-23 DIAGNOSIS — M25.511 ACUTE POSTOPERATIVE PAIN OF RIGHT SHOULDER: ICD-10-CM

## 2025-04-23 PROCEDURE — 97530 THERAPEUTIC ACTIVITIES: CPT | Mod: PN

## 2025-04-23 PROCEDURE — 97112 NEUROMUSCULAR REEDUCATION: CPT | Mod: PN

## 2025-04-23 NOTE — PROGRESS NOTES
"[facility]   Physical Therapy Treatment Note      Name: Santiago De Luna  [facility] Number: [id number]    Therapy Diagnosis:   Encounter Diagnoses   Name Primary?    Post-operative state Yes    Acute postoperative pain of right shoulder            Physician: Rivera Fernandes, *    Visit Date: 4/23/2025      Physician Orders: PT Eval and Treat: Massive size Rotator cuff protocol... Biceps tenodesis was not performed; if tenodesis was performed limit aggressive biceps flexion exercises for first 6 weeks     Medical Diagnosis from Referral: M75.101 (ICD-10-CM) - Rotator cuff syndrome, right S46.011A (ICD-10-CM) - Traumatic complete tear of right rotator cuff, initial encounter M25.811 (ICD-10-CM) - Impingement of right shoulder   Evaluation Date: 11/18/2024  Authorization Period Expiration: 12/31/2024  Plan of Care Expiration: updated to 6/14/2025  Progress Note Due: 5/21/25  Date of Surgery: 11/12/2024  Visit # / Visits authorized:  26/ 41    FOTO: 3/ 3 last issued 3/21     Precautions: Standard and post-op      Time In: 9:15 am   Time Out: 10:15 am   Total Billable Time: 60 minutes     PTA Visit #: 1/5       Subjective     Patient states: feeling good overall. Able to perform more home activities without increased sx. Feels that now that his shoulder motion is improving he just needs to work on strength, especially as he notes atrophy of pecs, deltoids when comparing to the non-op side. Says he is able to return to the gym to do weight lifting for legs, with light lifting for the arms (which includes resisted rows and lat pulls). Was also able to ride his bike for a short distance over the weekend, but says that having his hands rest ont he handle bars and maneuvering change of directions is challenging.    He was compliant with home exercise program.   Response to previous treatment: "good, soreness"   Functional change: able to start rows and chest press in the gym with light weight and no " "pain; able to change a light bulb.     Pain: 0/10 "when reaching overhead; 0/10 at rest"   Location: R shoulder     Objective       See progress note 3/21/2025     Treatment     Santiago received the treatments listed below:        neuromuscular re-education activities to improve: Balance, Coordination, Kinesthetic, Sense, Proprioception, and Posture for  18  minutes. The following activities were included:  Ball on wall FLEX circles 15x cw/ccw (blue air ball)   Scap clocks red ( small rom due to difficulty) B 5x  Ball on wall FLEX alphabet (blue ball) x1   Ball on wall SCAP circles x 10 cw/ccw     Seated 90-90 abd + shoulder ER x 2x15 x 1# DB  -- arm supported on table  +seated 90-90 flex + shoulder ER x 2 x 10 x 1DB  -- arm supported on table  90/90 robot x YTB, 2 x 10 IR (double handle), 2x10 in limited ROM ER (single handle)   W yellow band 2 x 10   +body blade (yellow): IR/ER 3x30", abd/add 3x20"    therapeutic activities to improve functional performance for 42 minutes, including:  +Dynamic warm up 2 x 40 ft   Zombie walk (fwd) x 1# DB   Zombie walk (lat) x 1# DB   Stat[place] x 2# DB    +cookie tray x 3 x 7 x yellow loop  +EOM plank over BOSU 4 x 20"    +standing landmine w/ dowel: 1x10x0#, 2x10x2# AW  +TRX rows x 1x3 -- attempted but d/c due to c/o increased vertigo    Wall slide reach/roll/lift 2 x 10   supine D2 pattern (no resistance) 3x10 vs 1# --- consider resuming prn in the future    Mini push up + Serratus Push up plus EOM (lowered hi-lo table) x 2 x 7    prone on PB: -- defer today, resume nv  Zach I/T/Y 3 x 10   Zach Rows vs 2# 3 x 10   trampoline toss with 1 hand catch (green weighted ball) x 20 -- defer today, resume nv        therapeutic exercises to develop strength, endurance, ROM, flexibility, posture, and core stabilization for 00 minutes including:  IR strap 20 sec x 4   UBE x 8 min (4 min fwd/4 min bwd) for ROM and postural awareness   Seated 90-90 flex + shoulder ER x 20 AROM     manual " therapy techniques: Joint mobilizations, Myofacial release, and Soft tissue Mobilization were applied to the: R shoulder for 00 minutes, including:  STM/MFR pecs, deltoids, posterior scap mm's (infra and supraspinatus, UT/LS)  GHJ glides all directions Gr I-III, GHJ distraction GrI-III     R lat MFR and MWM     Patient Education and Home Exercises       Education provided:   - cont IR stretching, scap muscle strengthening as laureano     Written Home Exercises Provided: Pt instructed to continue prior HEP. Exercises were reviewed and Santiago was able to demonstrate them prior to the end of the session.  Santiago demonstrated good  understanding of the education provided. See Electronic Medical Record under Patient Instructions for exercises provided during therapy sessions    Assessment     Attempted to progress shoulder strength above shoulder height as well as proprioception with BOSU and body blade to improve motor control, coordination, mm endurance, and ADL tolerance. Pt quick to fatigue with resisted OH activities, but able to perform shoulder ER at 90-90 abd and flex with light resistance today, indicating slow but good improvements in RTC strength, mm activation. Attempted to progress resisted rows in an inverted position but pt requested to discontinue due to Hx vertigo and visual disturbances in abnormal head positions or with increased visual stimulation (ie. busy backgrounds). Encouraged pt to eat a good breakfast or snack before attending PT to reduce c/o lightheadedness or nausea from low blood sugar, as pt experienced an incident of this during pt's session and required crackers and juice to resolve sx.    Santiago Is progressing well towards his goals.   Patient prognosis is Good.     Patient will continue to benefit from skilled outpatient physical therapy to address the deficits listed in the problem list box on initial evaluation, provide pt/family education and to maximize pt's level of independence in the  home and community environment.     Patient's spiritual, cultural and educational needs considered and pt agreeable to plan of care and goals.     Anticipated barriers to physical therapy: standard, transportation    Goals: updated 04/23/2025    Short Term Goals (4 Weeks):   1. Pt to demonstrate improved PROM by 10% to allow pt to perform self care activities with increased ease. (  met)  2. Pt to demonstrate improved flexibility by a half grade to allow improved ADLs with increased ease.  (met)  3. Pt will report <2/10 pain within the R shoulder for ease with donning/doffing sling. ( Met )  4. Pt will report being independent with his/her HEP for maintenance of improvements gained during therapy sessions (met)  5. Pt to demonstrate improved functional ability with FOTO score >=48% . ( met, )     Long Term Goals (12 Weeks):   1. Pt to demonstrate improved ROM to WNL, R=L, to allow pt to perform self care with increased ease. (Not met, progressing)  2. Pt to demonstrate improved flexibility by a full grade to allow improved postural alignment with increased ease. (Not met, progressing)  3. Pt will demonstrate >=4+/5 strength within the R shoulder for ease with house hold chores (Not met, progressing)  4. Pt to demonstrate improved functional ability with FOTO score >=67% . (Not met, progressing)  5. Pt independent with HEP and demonstrates good return technique. (Not met, progressing)    Plan     Cont to progress towards goals set by PT focusing on joint range of motion and scap control.      Jo Ann Mei, PT

## 2025-04-28 ENCOUNTER — CLINICAL SUPPORT (OUTPATIENT)
Dept: REHABILITATION | Facility: OTHER | Age: 65
End: 2025-04-28
Payer: MEDICARE

## 2025-04-28 DIAGNOSIS — Z98.890 POST-OPERATIVE STATE: Primary | ICD-10-CM

## 2025-04-28 DIAGNOSIS — G89.18 ACUTE POSTOPERATIVE PAIN OF RIGHT SHOULDER: ICD-10-CM

## 2025-04-28 DIAGNOSIS — M25.511 ACUTE POSTOPERATIVE PAIN OF RIGHT SHOULDER: ICD-10-CM

## 2025-04-28 PROCEDURE — 97530 THERAPEUTIC ACTIVITIES: CPT | Mod: PN | Performed by: PHYSICAL THERAPIST

## 2025-04-28 PROCEDURE — 97110 THERAPEUTIC EXERCISES: CPT | Mod: PN | Performed by: PHYSICAL THERAPIST

## 2025-04-28 NOTE — PROGRESS NOTES
"[facility]   Physical Therapy Treatment and Discharge Note      Name: Santiago De Luna  [facility] Number: [id number]    Therapy Diagnosis:   Encounter Diagnoses   Name Primary?    Post-operative state Yes    Acute postoperative pain of right shoulder            Physician: Rivera Fernandes, *    Visit Date: 4/28/2025      Physician Orders: PT Eval and Treat: Massive size Rotator cuff protocol... Biceps tenodesis was not performed; if tenodesis was performed limit aggressive biceps flexion exercises for first 6 weeks     Medical Diagnosis from Referral: M75.101 (ICD-10-CM) - Rotator cuff syndrome, right S46.011A (ICD-10-CM) - Traumatic complete tear of right rotator cuff, initial encounter M25.811 (ICD-10-CM) - Impingement of right shoulder   Evaluation Date: 11/18/2024  Authorization Period Expiration: 12/31/2024  Plan of Care Expiration: updated to 6/14/2025  Progress Note Due: n/a - discharge 4/28/25  Date of Surgery: 11/12/2024  Visit # / Visits authorized:  28/ 41    FOTO: 3/ 3 closed 4/28/25     Precautions: Standard and post-op      Time In: 1115   Time Out: 1200  Total Billable Time: 45 minutes     PTA Visit #: 0/5       Subjective     Patient states: overall he's feeling much better. He still feels the shoulder a little bit with more extreme motions, but with his normal daily function he feels 95% improved.     He was compliant with home exercise program.   Response to previous treatment: "good, soreness"   Functional change: able to start rows and chest press in the gym with light weight and no pain; able to change a light bulb.     Pain: 0/10   Location: R shoulder     Objective    4/28/2025      shoulder A/PROM  Flexion R 170/180 deg (in supine)  ER at 90 passive 90 deg,  85  L   IR at 90 passive 60 deg, L 60  L       1 inch R IR difference vs  L     MMT R shoulder  Flexion 4/5  Abd 4+/5  IR 5/5  ER 4+/5        CMS Impairment/Limitation/Restriction for FOTO Shoulder " "Survey    Therapist reviewed FOTO scores for Santiago De Luna on 4/28/2025.   FOTO documents entered into [employer] - see Media section.    Evaluation: 39%    Current : 94%    Goal: 73%    Discharge: 94%        Treatment     Santiago received the treatments listed below:        neuromuscular re-education activities to improve: Balance, Coordination, Kinesthetic, Sense, Proprioception, and Posture for  00  minutes. The following activities were included:  Ball on wall FLEX circles 15x cw/ccw (blue air ball)   Scap clocks red ( small rom due to difficulty) B 5x  Ball on wall FLEX alphabet (blue ball) x1   Ball on wall SCAP circles x 10 cw/ccw     Seated 90-90 abd + shoulder ER x 2x15 x 1# DB  -- arm supported on table  +seated 90-90 flex + shoulder ER x 2 x 10 x 1DB  -- arm supported on table  90/90 robot x YTB, 2 x 10 IR (double handle), 2x10 in limited ROM ER (single handle)   W yellow band 2 x 10   +body blade (yellow): IR/ER 3x30", abd/add 3x20"    therapeutic activities to improve functional performance for 37 minutes, including:  Reassessment and discharge. Review of HEP including options with gym exercise. All questions answered    +Dynamic warm up 2 x 40 ft   Zombie walk (fwd) x 1# DB   Zombie walk (lat) x 1# DB   Statue of liberty x 2# DB    +cookie tray x 3 x 7 x yellow loop  +EOM plank over BOSU 4 x 20"    +standing landmine w/ dowel: 1x10x0#, 2x10x2# AW  +TRX rows x 1x3 -- attempted but d/c due to c/o increased vertigo    Wall slide reach/roll/lift 2 x 10   supine D2 pattern (no resistance) 3x10 vs 1# --- consider resuming prn in the future    Mini push up + Serratus Push up plus EOM (lowered hi-lo table) x 2 x 7    prone on PB: -- defer today, resume nv  Zach I/T/Y 3 x 10   Zach Rows vs 2# 3 x 10   trampoline toss with 1 hand catch (green weighted ball) x 20 -- defer today, resume nv        therapeutic exercises to develop strength, endurance, ROM, flexibility, posture, and core stabilization for 08 minutes " including:  IR strap 20 sec x 4   UBE x 8 min (4 min fwd/4 min bwd) for ROM and postural awareness   Seated 90-90 flex + shoulder ER x 20 AROM     manual therapy techniques: Joint mobilizations, Myofacial release, and Soft tissue Mobilization were applied to the: R shoulder for 00 minutes, including:  STM/MFR pecs, deltoids, posterior scap mm's (infra and supraspinatus, UT/LS)  GHJ glides all directions Gr I-III, GHJ distraction GrI-III     R lat MFR and MWM     Patient Education and Home Exercises       Education provided:   - cont IR stretching, scap muscle strengthening as laureano     Written Home Exercises Provided: Pt instructed to continue prior HEP. Exercises were reviewed and Santiago was able to demonstrate them prior to the end of the session.  Santiago demonstrated good  understanding of the education provided. See Electronic Medical Record under Patient Instructions for exercises provided during therapy sessions    Assessment     Overall Santiago has made significant progress with therapy. He continues with min weakness to shoulder flexion and abduction, but this has significantly improved. He reports that he feels his function is at 95%, and FOTO/DASH scores indicate significant improvement in function. ROM is WFL and equal to opposite shoulder. At this time pt feels confident with continuing his strengthening program at the gym. We have reviewed his HEP and recommended gym progressions, and pt is discharged at this time.     Santiago Is progressing well towards his goals.   Patient prognosis is Good.     Patient will continue to benefit from skilled outpatient physical therapy to address the deficits listed in the problem list box on initial evaluation, provide pt/family education and to maximize pt's level of independence in the home and community environment.     Patient's spiritual, cultural and educational needs considered and pt agreeable to plan of care and goals.     Anticipated barriers to physical therapy:  standard, transportation    Goals: updated 04/28/2025    Short Term Goals (4 Weeks):   1. Pt to demonstrate improved PROM by 10% to allow pt to perform self care activities with increased ease. (  met)  2. Pt to demonstrate improved flexibility by a half grade to allow improved ADLs with increased ease.  (met)  3. Pt will report <2/10 pain within the R shoulder for ease with donning/doffing sling. ( Met )  4. Pt will report being independent with his/her HEP for maintenance of improvements gained during therapy sessions (met)  5. Pt to demonstrate improved functional ability with FOTO score >=48% . ( met, )     Long Term Goals (12 Weeks):   1. Pt to demonstrate improved ROM to WNL, R=L, to allow pt to perform self care with increased ease. (met)  2. Pt to demonstrate improved flexibility by a full grade to allow improved postural alignment with increased ease. (met)  3. Pt will demonstrate >=4+/5 strength within the R shoulder for ease with house hold chores (partially met)  4. Pt to demonstrate improved functional ability with FOTO score >=67% . (met)  5. Pt independent with HEP and demonstrates good return technique. (met)    Plan     Discharge to independent HEP and gym program     Felicita Anderson, PT, DPT

## 2025-05-05 ENCOUNTER — OFFICE VISIT (OUTPATIENT)
Dept: SPORTS MEDICINE | Facility: CLINIC | Age: 65
End: 2025-05-05
Payer: MEDICARE

## 2025-05-05 ENCOUNTER — HOSPITAL ENCOUNTER (OUTPATIENT)
Dept: RADIOLOGY | Facility: HOSPITAL | Age: 65
Discharge: HOME | End: 2025-05-05
Attending: ORTHOPAEDIC SURGERY
Payer: MEDICARE

## 2025-05-05 VITALS
SYSTOLIC BLOOD PRESSURE: 100 MMHG | HEIGHT: 72 IN | DIASTOLIC BLOOD PRESSURE: 66 MMHG | WEIGHT: 208.63 LBS | BODY MASS INDEX: 28.26 KG/M2 | HEART RATE: 61 BPM

## 2025-05-05 DIAGNOSIS — M25.511 RIGHT SHOULDER PAIN, UNSPECIFIED CHRONICITY: Primary | ICD-10-CM

## 2025-05-05 DIAGNOSIS — M25.511 RIGHT SHOULDER PAIN, UNSPECIFIED CHRONICITY: ICD-10-CM

## 2025-05-05 PROCEDURE — 99213 OFFICE O/P EST LOW 20 MIN: CPT | Mod: PBBFAC,25 | Performed by: ORTHOPAEDIC SURGERY

## 2025-05-05 PROCEDURE — 73030 X-RAY EXAM OF SHOULDER: CPT | Mod: 26,RT,, | Performed by: RADIOLOGY

## 2025-05-05 PROCEDURE — 99999 PR PBB SHADOW E&M-EST. PATIENT-LVL III: CPT | Mod: PBBFAC,,, | Performed by: ORTHOPAEDIC SURGERY

## 2025-05-05 PROCEDURE — 99214 OFFICE O/P EST MOD 30 MIN: CPT | Mod: S$PBB,,, | Performed by: ORTHOPAEDIC SURGERY

## 2025-05-05 PROCEDURE — 73030 X-RAY EXAM OF SHOULDER: CPT | Mod: TC,RT

## 2025-05-05 NOTE — PROGRESS NOTES
Subjective:     Chief Complaint: Santiago De Luna is a 65 y.o. male who had concerns including Pain of the Right Shoulder and Follow-up.    History of Present Illness    CHIEF COMPLAINT:  - 6-month post-op follow-up after shoulder surgery.    HPI:  Santiago presents for a 6-month post-op follow-up. He reports graduating from PT today, describing the experience positively. He has been walking early in his recovery and recently started riding a bike. Initially, biking caused some apprehension but now only causes minor discomfort, which he attributes to the need to gain strength. He has begun lifting weights at [employer], using controlled machines with very light weights, acknowledging the need to progress slowly. He expresses interest in returning to activities such as swimming, golf, and throwing a frisbee, inquiring about the timeline for resuming these activities. He mentions continuing exercises recommended by his physical therapist to address ongoing deficits.    PREVIOUS TREATMENTS:  - PT at [facility]: Completed on the day of the visit    WORK STATUS:  - Moving to [place] on July 7th for work  - Running a service line, described as the largest in [place]  - Area of responsibility extends from [place] to [place], covering [place]           Pain Related Questions  Over the past 3 days, what was your average pain during activity? (I.e. running, jogging, walking, climbing stairs, getting dressed, ect.): 0  Over the past 3 days, what was your highest pain level?: 0  Over the past 3 days, what was your lowest pain level? : 0    Other  How many nights a week are you awakened by your affected body part?: 0  Was the patient's HEIGHT measured or patient reported?: Patient Reported  Was the patient's WEIGHT measured or patient reported?: Measured    Past Medical History[1]     Past Surgical History:   Procedure Laterality Date    ANTERIOR CRUCIATE LIGAMENT REPAIR Bilateral     ARTHROSCOPIC DEBRIDEMENT OF SHOULDER  Right 11/12/2024    Procedure: DEBRIDEMENT, SHOULDER, ARTHROSCOPIC;  Surgeon: Aura Schwarz MD;  Location: [facility] OR;  Service: Orthopedics;  Laterality: Right;    HERNIA REPAIR  09/2017    ROTATOR CUFF REPAIR Bilateral     ROTATOR CUFF REPAIR Right 11/12/2024    Procedure: REPAIR, ROTATOR CUFF;  Surgeon: Aura Schwarz MD;  Location: [facility] OR;  Service: Orthopedics;  Laterality: Right;  KWABENA 50cc       Objective:     General: Santiago is well-developed, well-nourished, appears stated age, in no acute distress, alert and oriented to time, place and person.     General    Vitals reviewed.  Constitutional: He is oriented to person, place, and time. He appears well-developed and well-nourished. No distress.   HENT:   Nose: Nose normal. Mouth/Throat: No oropharyngeal exudate.   Eyes: Pupils are equal, round, and reactive to light. Right eye exhibits no discharge. Left eye exhibits no discharge.   Cardiovascular:  Normal rate and intact distal pulses.            Pulmonary/Chest: Effort normal and breath sounds normal. No respiratory distress.   Neurological: He is alert and oriented to person, place, and time. He has normal reflexes. He displays normal reflexes. No cranial nerve deficit. Coordination normal.   Psychiatric: He has a normal mood and affect. His behavior is normal. Judgment and thought content normal.         Right Shoulder Exam     Inspection/Observation   Swelling: absent  Bruising: absent  Scars: present  Deformity: absent  Scapular Winging: absent  Scapular Dyskinesia: negative  Atrophy: absent    Tenderness   The patient is experiencing no tenderness.    Range of Motion   Active abduction:  90 normal   Passive abduction:  100 normal   Extension:  0 normal   Forward Flexion:  180 normal   Forward Elevation: 180 normal  Adduction: 40 normal  External Rotation 0 degrees:  60 normal   External Rotation 90 degrees: 90 normal  Internal rotation 0 degrees:  L1 normal   Internal rotation 90 degrees:  30 normal      Tests & Signs   Apprehension: negative  Cross arm: negative  Drop arm: negative  Servin test: negative  Impingement: negative  Sulcus: absent  Anterosuperior Escape: negative  Lag Sign 0 degrees: negative  Lag Sign 90 degrees: negative  Lift Off Sign: negative  Belly Press: negative  Active Compression Test (Wilbarger's Sign): negative  Yergason's Test: negative  Speed's Test: negative  Anterior Drawer Test: 1+   Posterior Drawer Test: 1+  Relocation 90 degrees: negative  Relocation > 90 degrees: negative  Bear Hug: negative  Moving Valgus: negative  Jerk Test: negative    Other   Sensation: normal    Comments:  ER slight weakness         Left Shoulder Exam   Left shoulder exam is normal.    Inspection/Observation   Swelling: absent  Bruising: absent  Scars: absent  Deformity: absent  Scapular Winging: absent  Scapular Dyskinesia: negative  Atrophy: absent    Tenderness   The patient is experiencing no tenderness.     Range of Motion   Active abduction:  90 normal   Passive abduction:  100 normal   Extension:  0 normal   Forward Flexion:  180 normal   Forward Elevation: 180 normal  Adduction: 40 normal  External Rotation 0 degrees:  60 normal   External Rotation 90 degrees: 90 normal  Internal rotation 0 degrees:  T8 normal   Internal rotation 90 degrees:  30 normal     Tests & Signs   Apprehension: negative  Cross arm: negative  Drop arm: negative  Servin test: negative  Impingement: negative  Sulcus: absent  Anterosuperior Escape: negative  Lag Sign 0 degrees: negative  Lag Sign 90 degrees: negative  Lift Off Sign: negative  Belly Press: negative  Active Compression test (Wilbarger's Sign): negative  Yergasons's Test: negative  Speed's Test: negative  Anterior Drawer Test: 1+  Posterior Drawer Test: 1+  Relocation 90 degrees: negative  Relocation > 90 degrees: negative  Bear Hug: negative  Moving Valgus: negative  Jerk Test: negative    Other   Sensation: normal       Muscle Strength   Right Upper Extremity    Shoulder Abduction: 5/5   Shoulder Internal Rotation: 5/5   Shoulder External Rotation: 5/5   Supraspinatus: 5/5   Subscapularis: 5/5   Biceps: 5/5   Left Upper Extremity  Shoulder Abduction: 5/5   Shoulder Internal Rotation: 5/5   Shoulder External Rotation: 5/5   Supraspinatus: 5/5   Subscapularis: 5/5   Biceps: 5/5     Reflexes     Left Side  Biceps:  2+  Triceps:  2+  Brachioradialis:  2+    Right Side   Biceps:  2+  Triceps:  2+  Brachioradialis:  2+    Vascular Exam     Right Pulses      Radial:                    2+      Left Pulses      Radial:                    2+      Capillary Refill  Right Hand: normal capillary refill  Left Hand: normal capillary refill              Radiographic findings:    X-ray Shoulder 2 or More Views Right  Narrative: EXAMINATION:  XR SHOULDER COMPLETE 2 OR MORE VIEWS RIGHT    CLINICAL HISTORY:  Pain in right shoulder    FINDINGS:  Shoulder complete three views right.    There is a tendon anchor in the proximal humerus.  There is mild DJD.  No acute fracture dislocation bone destruction seen.  No trauma seen.  No hardware failure seen.  Impression: Chronic change as above.    Electronically signed by: Bereket Vasquez MD  Date:    05/06/2025  Time:    08:22            These findings were discussed and reviewed with the patient.     Assessment:     Encounter Diagnosis   Name Primary?    Right shoulder pain, unspecified chronicity Yes        Plan:     Assessment & Plan    FOLLOW UP:  - Follow up as needed.    PATIENT INSTRUCTIONS:  - Continue exercises provided by physical therapist to improve strength and range of motion.  - Gradually increase intensity of biking and weight lifting as tolerated.  - Avoid throwing frisbees for ~1 year.           All of the patient's questions were answered and the patient will contact us if they have any questions or concerns in the interim.    This note was generated with the assistance of ambient listening technology. Verbal consent was obtained  by the patient and accompanying visitor(s) for the recording of patient appointment to facilitate this note. I attest to having reviewed and edited the generated note for accuracy, though some syntax or spelling errors may persist. Please contact the author of this note for any clarification.             [1]   Past Medical History:  Diagnosis Date    Anxiety disorder, unspecified     Male erectile dysfunction, unspecified

## 2025-06-27 ENCOUNTER — OFFICE VISIT (OUTPATIENT)
Dept: PODIATRY | Facility: CLINIC | Age: 65
End: 2025-06-27
Payer: MEDICARE

## 2025-06-27 VITALS
WEIGHT: 208.56 LBS | HEART RATE: 64 BPM | SYSTOLIC BLOOD PRESSURE: 122 MMHG | HEIGHT: 72 IN | BODY MASS INDEX: 28.25 KG/M2 | DIASTOLIC BLOOD PRESSURE: 76 MMHG

## 2025-06-27 DIAGNOSIS — M72.2 PLANTAR FASCIITIS: Primary | ICD-10-CM

## 2025-06-27 DIAGNOSIS — M79.671 RIGHT FOOT PAIN: ICD-10-CM

## 2025-06-27 PROCEDURE — 99999 PR PBB SHADOW E&M-EST. PATIENT-LVL III: CPT | Mod: PBBFAC,,, | Performed by: PODIATRIST

## 2025-06-27 PROCEDURE — 99213 OFFICE O/P EST LOW 20 MIN: CPT | Mod: S$PBB,,, | Performed by: PODIATRIST

## 2025-06-27 PROCEDURE — 99213 OFFICE O/P EST LOW 20 MIN: CPT | Mod: PBBFAC,PN | Performed by: PODIATRIST

## 2025-06-27 RX ORDER — MELOXICAM 15 MG/1
15 TABLET ORAL DAILY PRN
Qty: 20 TABLET | Refills: 0 | Status: SHIPPED | OUTPATIENT
Start: 2025-06-27 | End: 2025-07-17

## 2025-07-06 NOTE — PROGRESS NOTES
PODIATRY NOTE       PATIENT ID:  Santiago De Luna is a 65 y.o. male.       CHIEF CONCERN:   Foot Pain (Right foot pain pt said it hurts to put pressure on it /2 weeks ago pain started )           MEDICAL DECISION MAKING:        Problem List Items Addressed This Visit    None  Visit Diagnoses         Plantar fasciitis    -  Primary    Relevant Medications    meloxicam (MOBIC) 15 MG tablet      Right foot pain        Relevant Medications    meloxicam (MOBIC) 15 MG tablet            I counseled the patient on the patient's conditions, their implications and medical management.  Recurrence of prior condition.    Passive stretching, continue arch supports.   Shoe and activity modification as needed for relief.   Prescription as above.   Patient defers CAM boot today.   Return to office as needed.       HISTORY OF PRESENT ILLNESS:  Santiago De Luna is a 65 y.o. male presents to clinic with concerns of Foot Pain (Right foot pain pt said it hurts to put pressure on it /2 weeks ago pain started )  . He has had pain to the right arch of the foot beforein December from walking at the time but no acute trauma recalled.    No self treatment yet.          Patient Active Problem List   Diagnosis    Traumatic complete tear of left rotator cuff    Vasomotor rhinitis    Non-traumatic rotator cuff tear, right    S/P arthroscopy of right shoulder    Chronic right shoulder pain    Impingement of right shoulder    Traumatic complete tear of right rotator cuff    Anxiety    Benign prostatic hyperplasia with nocturia    Snoring    Marijuana use    Alcohol consumption of more than two drinks per day    Macrocytic anemia    Post-operative state    Right shoulder pain           Current Outpatient Medications on File Prior to Visit   Medication Sig Dispense Refill    tadalafiL (CIALIS) 5 MG tablet Take 1 tablet (5 mg total) by mouth daily as needed for Erectile Dysfunction. 30 tablet 11    tamsulosin (FLOMAX) 0.4 mg Cap Take 1 capsule (0.4 mg  total) by mouth once daily. 90 capsule 3     No current facility-administered medications on file prior to visit.           Review of patient's allergies indicates:   Allergen Reactions    Indomethacin      Diarrhea and gas    Shellfish derived Itching, Rash and Swelling     Mussels,clams,shrimps- rash  Swelling of face      Hydrocodone Hives, Itching and Rash    Hydrocodone-acetaminophen Itching and Rash    Mupirocin Diarrhea, Nausea And Vomiting and Other (See Comments)             Review of Systems -   General ROS: negative for - chills, fever or night sweats  Respiratory ROS: no cough, shortness of breath, or wheezing  Cardiovascular ROS: no chest pain or dyspnea on exertion  Musculoskeletal ROS: positive for - joint pain and joint stiffness  negative for - muscle pain or muscular weakness  Neurological ROS: no TIA or stroke symptoms      EXAM:     Vitals:    06/27/25 1101   BP: 122/76   Pulse: 64   Weight: 94.6 kg (208 lb 8.9 oz)   Height: 6' (1.829 m)        General:  Alert and Oriented x 3;  No acute distress      Lower extremity exam:    Vascular:   Dorsalis Pedis:  present   Posterior Tibial:  present  Capillary refill time:  3 seconds  Temperature of toes warm to touch  Edema:  none      Neurological:     Sharp touch:  normal  Light touch: normal  Tinels Sign:  Absent  Mulders Click:   Absent        Dermatological:   Skin tone, color, turgor is appropriate for age and gender  Open wounds:  absent  Bruising:  absent  Redness:  minimal      Musculoskeletal:   Dorsal bony prominence noted  at the 1st MTPJ.     Limited 1st MTPJ range of motion with crepitus, no trackbound joint.    Approximately 20-30 degrees dorsiflexion unloaded/loaded.  Tenderness to palpation medial arch right foot.      High arch foot type.       2/6/2024  right foot xrays:   FINDINGS:  No fracture dislocation bone destruction seen.  There is DJD and spurs on the calcaneus.

## (undated) DEVICE — KIT TRIMANO CHIN

## (undated) DEVICE — BNDG COFLEX FOAM LF2 ST 6X5YD

## (undated) DEVICE — Device

## (undated) DEVICE — DRAPE THREE-QTR REINF 53X77IN

## (undated) DEVICE — NDL SURGEON MAYO #4

## (undated) DEVICE — SHAVER ULTRAFFR 4.2MM

## (undated) DEVICE — DRAPE TOP 53X102IN

## (undated) DEVICE — DRAPE STERI INSTRUMENT 1018

## (undated) DEVICE — APPLICATOR CHLORAPREP ORN 26ML

## (undated) DEVICE — KIT TRIMANO

## (undated) DEVICE — COVER CAMERA OPERATING ROOM

## (undated) DEVICE — DRAPE INCISE IOBAN 2 23X17IN

## (undated) DEVICE — SUT ETHILON 4-0 BLK MONO

## (undated) DEVICE — GOWN SMARTSLEEVE XXL/XLONG

## (undated) DEVICE — SOL NACL IRR 3000ML

## (undated) DEVICE — PAD ABDOMINAL STERILE 8X10IN

## (undated) DEVICE — DRG DOC 8.0 X 85MM

## (undated) DEVICE — SUT ETHILON 3-0 PS2 18 BLK

## (undated) DEVICE — NDL SCORPION HD MEGALOADER

## (undated) DEVICE — DRAPE SHOULDER BEACH CHAIR

## (undated) DEVICE — SYR 30CC LUER LOCK

## (undated) DEVICE — SOL NACL IRR 1000ML BTL

## (undated) DEVICE — DRAPE STERI U-SHAPED 47X51IN

## (undated) DEVICE — NDL SUTURE FLEXIBLE

## (undated) DEVICE — PAD ELECTRODE STER 1.5X3

## (undated) DEVICE — CANNULA ARTHSCP BNNT 10MMX50CM

## (undated) DEVICE — DRAPE STERI-DRAPE 1000 17X11IN

## (undated) DEVICE — DRAPE U SPLIT SHEET 54X76IN

## (undated) DEVICE — NDL HYPO STD REG BVL 18GX1.5IN

## (undated) DEVICE — DUAL SPIKE ADAPTER

## (undated) DEVICE — CANNULA HEX FLEX 7 X 85

## (undated) DEVICE — UNDERGLOVES BIOGEL PI SZ 7 LF

## (undated) DEVICE — TAPE SURG MEDIPORE 6X72IN

## (undated) DEVICE — GLOVE BIOGEL SKINSENSE PI 7.0

## (undated) DEVICE — UNDERGLOVES BIOGEL PI SIZE 8.5

## (undated) DEVICE — DRESSING XEROFORM NONADH 1X8IN

## (undated) DEVICE — ELECTRODE 90 DEGREE ANGLE